# Patient Record
Sex: FEMALE | Race: WHITE | NOT HISPANIC OR LATINO | Employment: OTHER | URBAN - METROPOLITAN AREA
[De-identification: names, ages, dates, MRNs, and addresses within clinical notes are randomized per-mention and may not be internally consistent; named-entity substitution may affect disease eponyms.]

---

## 2017-01-31 ENCOUNTER — ALLSCRIPTS OFFICE VISIT (OUTPATIENT)
Dept: OTHER | Facility: OTHER | Age: 82
End: 2017-01-31

## 2017-02-14 ENCOUNTER — GENERIC CONVERSION - ENCOUNTER (OUTPATIENT)
Dept: OTHER | Facility: OTHER | Age: 82
End: 2017-02-14

## 2017-02-24 ENCOUNTER — TRANSCRIBE ORDERS (OUTPATIENT)
Dept: LAB | Facility: CLINIC | Age: 82
End: 2017-02-24

## 2017-02-24 ENCOUNTER — APPOINTMENT (OUTPATIENT)
Dept: LAB | Facility: CLINIC | Age: 82
End: 2017-02-24
Payer: MEDICARE

## 2017-02-24 ENCOUNTER — GENERIC CONVERSION - ENCOUNTER (OUTPATIENT)
Dept: OTHER | Facility: OTHER | Age: 82
End: 2017-02-24

## 2017-02-24 ENCOUNTER — ALLSCRIPTS OFFICE VISIT (OUTPATIENT)
Dept: OTHER | Facility: OTHER | Age: 82
End: 2017-02-24

## 2017-02-24 DIAGNOSIS — I50.9 HEART FAILURE (HCC): ICD-10-CM

## 2017-02-24 LAB
ANION GAP SERPL CALCULATED.3IONS-SCNC: 6 MMOL/L (ref 4–13)
BUN SERPL-MCNC: 24 MG/DL (ref 5–25)
CALCIUM SERPL-MCNC: 9.4 MG/DL (ref 8.3–10.1)
CHLORIDE SERPL-SCNC: 106 MMOL/L (ref 100–108)
CO2 SERPL-SCNC: 29 MMOL/L (ref 21–32)
CREAT SERPL-MCNC: 0.97 MG/DL (ref 0.6–1.3)
GFR SERPL CREATININE-BSD FRML MDRD: 54.8 ML/MIN/1.73SQ M
GLUCOSE SERPL-MCNC: 86 MG/DL (ref 65–140)
HBA1C MFR BLD HPLC: 6.7 %
POTASSIUM SERPL-SCNC: 4.4 MMOL/L (ref 3.5–5.3)
SODIUM SERPL-SCNC: 141 MMOL/L (ref 136–145)

## 2017-02-24 PROCEDURE — 80048 BASIC METABOLIC PNL TOTAL CA: CPT

## 2017-02-24 PROCEDURE — 36415 COLL VENOUS BLD VENIPUNCTURE: CPT

## 2017-03-01 ENCOUNTER — GENERIC CONVERSION - ENCOUNTER (OUTPATIENT)
Dept: OTHER | Facility: OTHER | Age: 82
End: 2017-03-01

## 2017-03-15 ENCOUNTER — GENERIC CONVERSION - ENCOUNTER (OUTPATIENT)
Dept: OTHER | Facility: OTHER | Age: 82
End: 2017-03-15

## 2017-03-24 ENCOUNTER — ALLSCRIPTS OFFICE VISIT (OUTPATIENT)
Dept: OTHER | Facility: OTHER | Age: 82
End: 2017-03-24

## 2017-04-25 ENCOUNTER — GENERIC CONVERSION - ENCOUNTER (OUTPATIENT)
Dept: OTHER | Facility: OTHER | Age: 82
End: 2017-04-25

## 2017-07-27 ENCOUNTER — ALLSCRIPTS OFFICE VISIT (OUTPATIENT)
Dept: OTHER | Facility: OTHER | Age: 82
End: 2017-07-27

## 2017-07-27 LAB — HBA1C MFR BLD HPLC: 6.3 %

## 2017-08-24 ENCOUNTER — GENERIC CONVERSION - ENCOUNTER (OUTPATIENT)
Dept: OTHER | Facility: OTHER | Age: 82
End: 2017-08-24

## 2017-09-01 ENCOUNTER — ALLSCRIPTS OFFICE VISIT (OUTPATIENT)
Dept: OTHER | Facility: OTHER | Age: 82
End: 2017-09-01

## 2017-09-11 ENCOUNTER — GENERIC CONVERSION - ENCOUNTER (OUTPATIENT)
Dept: OTHER | Facility: OTHER | Age: 82
End: 2017-09-11

## 2017-09-12 ENCOUNTER — GENERIC CONVERSION - ENCOUNTER (OUTPATIENT)
Dept: OTHER | Facility: OTHER | Age: 82
End: 2017-09-12

## 2017-09-26 ENCOUNTER — GENERIC CONVERSION - ENCOUNTER (OUTPATIENT)
Dept: OTHER | Facility: OTHER | Age: 82
End: 2017-09-26

## 2017-09-28 ENCOUNTER — GENERIC CONVERSION - ENCOUNTER (OUTPATIENT)
Dept: OTHER | Facility: OTHER | Age: 82
End: 2017-09-28

## 2018-01-09 ENCOUNTER — GENERIC CONVERSION - ENCOUNTER (OUTPATIENT)
Dept: FAMILY MEDICINE CLINIC | Facility: CLINIC | Age: 83
End: 2018-01-09

## 2018-01-10 NOTE — RESULT NOTES
Verified Results  (1) BASIC METABOLIC PROFILE [id number] 12:07PM Mc Torres Order Number: [id number]     Test Name Result Flag Reference   GLUCOSE,RANDM 86 mg/dL     If the patient is fasting, the ADA then defines impaired fasting glucose as > 100 mg/dL and diabetes as > or equal to 123 mg/dL  SODIUM 141 mmol/L  136-145   POTASSIUM 4 4 mmol/L  3 5-5 3   CHLORIDE 106 mmol/L  100-108   CARBON DIOXIDE 29 mmol/L  21-32   ANION GAP (CALC) 6 mmol/L  4-13   BLOOD UREA NITROGEN 24 mg/dL  5-25   CREATININE 0 97 mg/dL  0 60-1 30   Standardized to IDMS reference method   CALCIUM 9 4 mg/dL  8 3-10 1   eGFR Non-African American 54 8 ml/min/1 73sq m     [facility] [employer] Disease Education Program recommendations are as follows:  GFR calculation is accurate only with a steady state creatinine  Chronic Kidney disease less than 60 ml/min/1 73 sq  meters  Kidney failure less than 15 ml/min/1 73 sq  meters

## 2018-01-10 NOTE — RESULT NOTES
Verified Results  (1) CBC/PLT/DIFF 21Jun2016 08:15AM EPIC, Provider   Test ordered by: Dileep Guerra     Test Name Result Flag Reference   WBC COUNT 6 33 Thousand/uL  4 31-10 16   RBC COUNT 4 06 Million/uL  3 81-5 12   HEMOGLOBIN 12 7 g/dL  11 5-15 4   HEMATOCRIT 39 1 %  34 8-46  1   MCV 96 fL  82-98   MCH 31 3 pg  26 8-34 3   MCHC 32 5 g/dL  31 4-37 4   RDW 14 0 %  11 6-15 1   MPV 10 0 fL  8 9-12 7   PLATELET COUNT 303 Thousands/uL  149-390   nRBC AUTOMATED 0 /100 WBCs     NEUTROPHILS RELATIVE PERCENT 62 %  43-75   LYMPHOCYTES RELATIVE PERCENT 27 %  14-44   MONOCYTES RELATIVE PERCENT 8 %  4-12   EOSINOPHILS RELATIVE PERCENT 3 %  0-6   BASOPHILS RELATIVE PERCENT 0 %  0-1   NEUTROPHILS ABSOLUTE COUNT 3 89 Thousands/?L  1 85-7 62   LYMPHOCYTES ABSOLUTE COUNT 1 73 Thousands/?L  0 60-4 47   MONOCYTES ABSOLUTE COUNT 0 51 Thousand/?L  0 17-1 22   EOSINOPHILS ABSOLUTE COUNT 0 17 Thousand/?L  0 00-0 61   BASOPHILS ABSOLUTE COUNT 0 02 Thousands/?L  0 00-0 10     (1) COMPREHENSIVE METABOLIC PANEL [id number] 69:18ZB EPIC, Provider   Test ordered by: Dileep Guerra     Test Name Result Flag Reference   GLUCOSE,RANDM 138 mg/dL     If the patient is fasting, the ADA then defines impaired fasting glucose as > 100 mg/dL and diabetes as > or equal to 123 mg/dL     SODIUM 143 mmol/L  136-145   POTASSIUM 4 7 mmol/L  3 5-5 3   CHLORIDE 108 mmol/L  100-108   CARBON DIOXIDE 30 mmol/L  21-32   ANION GAP (CALC) 5 mmol/L  4-13   BLOOD UREA NITROGEN 22 mg/dL  5-25   CREATININE 0 77 mg/dL  0 60-1 30   Standardized to IDMS reference method   CALCIUM 9 4 mg/dL  8 3-10 1   BILI, TOTAL 0 99 mg/dL  0 20-1 00   ALK PHOSPHATAS 106 U/L     ALT (SGPT) 22 U/L  12-78   AST(SGOT) 23 U/L  5-45   ALBUMIN 4 1 g/dL  3 5-5 0   TOTAL PROTEIN 7 1 g/dL  6 4-8 2   eGFR Non-African American      >60 0 ml/min/1 73sq m   [facility] Disease Education Program recommendations are as follows:  GFR calculation is accurate only with a steady state creatinine  Chronic Kidney disease less than 60 ml/min/1 73 sq  meters  Kidney failure less than 15 ml/min/1 73 sq  meters  (1) LIPID PANEL, FASTING 21Jun2016 08:15AM EPIC, Provider   Test ordered by: Nena Borrero     Test Name Result Flag Reference   CHOLESTEROL 175 mg/dL     HDL,DIRECT 59 mg/dL  40-60   Specimen collection should occur prior to Metamizole administration due to the potential for falsely depressed results  LDL CHOLESTEROL CALCULATED 92 mg/dL  0-100   Triglyceride:         Normal              <150 mg/dl       Borderline High    150-199 mg/dl       High               200-499 mg/dl       Very High          >499 mg/dl  Cholesterol:         Desirable        <200 mg/dl      Borderline High  200-239 mg/dl      High             >239 mg/dl  HDL Cholesterol:        High    >59 mg/dL      Low     <41 mg/dL  LDL CALCULATED:    This screening LDL is a calculated result  It does not have the accuracy of the Direct Measured LDL in the monitoring of patients with hyperlipidemia and/or statin therapy  Direct Measure LDL (NXW055) must be ordered separately in these patients  TRIGLYCERIDES 119 mg/dL  <=150   Specimen collection should occur prior to N-Acetylcysteine or Metamizole administration due to the potential for falsely depressed results  (1) HEMOGLOBIN A1C 21Jun2016 08:15AM Wilda Rolando Order Number: [id number]   Order Number: [id number]     Test Name Result Flag Reference   HEMOGLOBIN A1C 6 6 % H 4 2-6 3   EST  AVG   GLUCOSE 143 mg/dl

## 2018-01-12 VITALS
DIASTOLIC BLOOD PRESSURE: 68 MMHG | HEIGHT: 66 IN | BODY MASS INDEX: 35.26 KG/M2 | HEART RATE: 84 BPM | SYSTOLIC BLOOD PRESSURE: 110 MMHG | WEIGHT: 219.38 LBS

## 2018-01-12 VITALS
OXYGEN SATURATION: 98 % | HEART RATE: 89 BPM | SYSTOLIC BLOOD PRESSURE: 100 MMHG | WEIGHT: 211 LBS | BODY MASS INDEX: 33.91 KG/M2 | DIASTOLIC BLOOD PRESSURE: 62 MMHG | HEIGHT: 66 IN

## 2018-01-13 NOTE — MISCELLANEOUS
Message   Recorded as Task   Date: 02/28/2017 01:40 PM, Created By: Dave Newman   Task Name: Medical Complaint Callback   Assigned To: Tamra Triage,Team   Regarding Patient: Tano Amador, Status: Active   Comment:    Dave Newman - 28 Feb 2017 1:40 PM     TASK CREATED  Caller: Self; Medical Complaint; [phone] (Home)  [facility] to monitor vs s/p afib, pt wants this again  Spoke with Licha at [facility] and they will need an order for homecare,  for med changes, new CHF, decreased endurance with SOB  as long as she is homebound  May we fax order to [facility] at [phone] Arash Nicole - 28 Feb 2017 3:27 PM     TASK REPLIED TO: Previously Assigned To Krystal Martinez - 28 Feb 2017 6:37 PM     TASK REASSIGNED: Previously Assigned To Marco Cruz - 01 Mar 2017 8:58 AM     TASK EDITED  Faxed orders to [phone]  Pt notified        Active Problems    1  Atrial fibrillation (427 31) (I48 91)   2  Atrial fibrillation with rapid ventricular response (427 31) (I48 91)   3  Bladder incontinence (788 30) (R32)   4  Carotid artery stenosis (433 10) (I65 29)   5  CHF (congestive heart failure) (428 0) (I50 9)   6  Controlled diabetes mellitus type II without complication (620 01) (Y36 5)   7  Coronary arteriosclerosis (414 00) (I25 10)   8  Diabetes mellitus with polyneuropathy (250 60,357 2) (E11 42)   9  Dry Eyes (375 15)   10  Esophageal reflux (530 81) (K21 9)   11  History of Fall, accidental (E888 9) (W19 XXXA)   12  Fatigue (780 79) (R53 83)   13  History of epistaxis (V12 69) (Z87 898)   14  Hyperlipidemia (272 4) (E78 5)   15  Hypertension (401 9) (I10)   16  Lipoma (214 9) (D17 9)   17  History of Lumbar compression fracture (805 4) (S32 000A)   18  OA (osteoarthritis) (715 90) (M19 90)   19  Overactive bladder (596 51) (N32 81)   20   Right lumbar radiculopathy (724 4) (M54 16)   21  Seborrheic dermatitis (690 10) (L21 9)   22  Tricuspid valve disorders, non-rheumatic (424 2) (I36 9)    Current Meds   1  Atorvastatin Calcium 80 MG Oral Tablet; TAKE 1 TABLET AT BEDTIME; Therapy: (Aime Steinberg) to Recorded   2  Eliquis 5 MG Oral Tablet; take 1 tablet every twelve hours; Therapy: (Recorded:41Jzp2543) to Recorded   3  Furosemide 20 MG Oral Tablet (Lasix); Take 2 tablet once daily or as directed (   increased 2/17); Therapy: 04CPP9429 to (Sarah Cancer)  Requested for: 63ROV9227; Last   Rx:70Jgg6024 Ordered   4  Hair Skin and Nails Formula TABS; Therapy: (Aniyah Izaguirre) to Recorded   5  Lisinopril 20 MG Oral Tablet; Take 1 tablet daily; Therapy: 28ZWL8998 to (Evaluate:30Opt1393)  Requested for: 32JXD2886; Last   Rx:33Aqz6947 Ordered   6  Metoprolol Tartrate 50 MG Oral Tablet; use 1 & 1/2 pill twice a day; Last Rx:76Myz9222   Ordered   7  Nitrostat 0 4 MG Sublingual Tablet Sublingual (Nitroglycerin); Take 1 under tongue every   5 min x 2 prn chest pain  Requested for: 14Mcb5749; Last Rx:26Kdf3618 Ordered   8  Restasis 0 05 % Ophthalmic Emulsion; INSTILL 1 DROP IN EACH EYE TWICE DAILY   Recorded   9  Vitamin D 2000 UNIT Oral Capsule; Therapy: (Recorded:28Jun2016) to Recorded    Allergies    1  Iodinated Contrast Media   2  Sulfa Drugs   3  CeleBREX CAPS    4   Adhesive Tape    Signatures   Electronically signed by : Sonya Soliz, ; Mar  1 2017  9:24AM EST                       (Author)

## 2018-01-14 VITALS
DIASTOLIC BLOOD PRESSURE: 86 MMHG | OXYGEN SATURATION: 99 % | SYSTOLIC BLOOD PRESSURE: 138 MMHG | RESPIRATION RATE: 18 BRPM | BODY MASS INDEX: 36.2 KG/M2 | HEART RATE: 118 BPM | WEIGHT: 225.25 LBS | HEIGHT: 66 IN

## 2018-01-14 VITALS
BODY MASS INDEX: 34.9 KG/M2 | HEART RATE: 102 BPM | SYSTOLIC BLOOD PRESSURE: 110 MMHG | HEIGHT: 66 IN | WEIGHT: 217.13 LBS | DIASTOLIC BLOOD PRESSURE: 68 MMHG

## 2018-01-15 VITALS
BODY MASS INDEX: 33.99 KG/M2 | HEIGHT: 66 IN | HEART RATE: 88 BPM | WEIGHT: 211.5 LBS | TEMPERATURE: 97 F | SYSTOLIC BLOOD PRESSURE: 132 MMHG | DIASTOLIC BLOOD PRESSURE: 84 MMHG

## 2018-01-15 NOTE — RESULT NOTES
Verified Results  (1) URINALYSIS (will reflex a microscopy if leukocytes, occult blood, protein or nitrites are not within normal limits) 29Jun2016 10:46AM Moi Gram Order Number: [id number]   Order Number: Ben Wong Comments: do UA C&S     Test Name Result Flag Reference   COLOR Yellow     CLARITY Clear     SPECIFIC GRAVITY UA 1 009  1 003-1 030   PH UA 5 0  4 5-8 0   LEUKOCYTE ESTERASE UA Trace A Negative   NITRITE UA Negative  Negative   PROTEIN UA Negative mg/dl  Negative   GLUCOSE UA Negative mg/dl  Negative   KETONES UA Negative mg/dl  Negative   UROBILINOGEN UA 0 2 E U /dl  0 2, 1 0 E U /dl   BILIRUBIN UA Negative  Negative   BLOOD UA Negative  Negative   Performing Comments: do UA C&S   BACTERIA Innumerable /hpf A None Seen, Occasional   Performing Comments: do UA C&S   EPITHELIAL CELLS None Seen /hpf  None Seen, Occasional   RBC UA None Seen /hpf  None Seen   WBC UA 2-4 /hpf A None Seen     (1) URINE CULTURE 29Jun2016 10:46AM Moi Gram Order Number: [id number]     Test Name Result Flag Reference   CLINICAL REPORT (Report)     Test:        Urine culture  Specimen Type:   Urine  Specimen Date:   6/29/2016 10:46 AM  Result Date:    7/1/2016 7:59 AM  Result Status:   Final result  Resulting Lab:   [address] [address]            Tel: [phone]      CULTURE                                       ------------------                                   60,000-69,000 cfu/ml Escherichia coli     *** This organism has been edited   The previous result was Gram Negative Avelino      Enteric Like on 6/30/2016 at 0800 EDT  ***      SUSCEPTIBILITY                                   ------------------                                                       Escherichia coli  METHOD                   -------------------------------------  -------------------  AMPICILLIN ($$)             >16 00 Resistant  AMPICILLIN + SULBACTAM ($)       >16/8  Resistant  AZTREONAM ($$$)             <=8   Susceptible  CEFAZOLIN ($)              >16 00 Resistant  CEFUROXIME ($$)             8    Susceptible  CIPROFLOXACIN ($)            >2 00  Resistant  GENTAMICIN ($$)             <4   Susceptible  LEVOFLOXACIN ($)            >4 00  Resistant  NITROFURANTOIN             <=32  Susceptible  PIPERACILLIN + TAZOBACTAM ($$$)     >64   Resistant  TETRACYCLINE              <=4   Susceptible  TOBRAMYCIN ($)             <=4   Susceptible  TRIMETHOPRIM + SULFAMETHOXAZOLE ($$$)  <=2/38 Susceptible       Plan  Urinary tract infection    · Nitrofurantoin Monohyd Macro 100 MG Oral Capsule; TAKE 1 CAPSULE EVERY 12  HOURS DAILY

## 2018-01-22 VITALS
OXYGEN SATURATION: 97 % | RESPIRATION RATE: 20 BRPM | HEIGHT: 66 IN | SYSTOLIC BLOOD PRESSURE: 88 MMHG | WEIGHT: 212.25 LBS | DIASTOLIC BLOOD PRESSURE: 60 MMHG | BODY MASS INDEX: 34.11 KG/M2 | HEART RATE: 70 BPM

## 2018-01-30 ENCOUNTER — OFFICE VISIT (OUTPATIENT)
Dept: FAMILY MEDICINE CLINIC | Facility: CLINIC | Age: 83
End: 2018-01-30
Payer: MEDICARE

## 2018-01-30 VITALS
SYSTOLIC BLOOD PRESSURE: 136 MMHG | HEART RATE: 84 BPM | BODY MASS INDEX: 37.63 KG/M2 | WEIGHT: 212.4 LBS | DIASTOLIC BLOOD PRESSURE: 74 MMHG | HEIGHT: 63 IN

## 2018-01-30 DIAGNOSIS — I50.32 CHRONIC DIASTOLIC CONGESTIVE HEART FAILURE (HCC): ICD-10-CM

## 2018-01-30 DIAGNOSIS — I10 ESSENTIAL HYPERTENSION: ICD-10-CM

## 2018-01-30 DIAGNOSIS — I48.20 CHRONIC ATRIAL FIBRILLATION (HCC): ICD-10-CM

## 2018-01-30 DIAGNOSIS — E11.9 CONTROLLED TYPE 2 DIABETES MELLITUS WITHOUT COMPLICATION, WITHOUT LONG-TERM CURRENT USE OF INSULIN (HCC): Primary | ICD-10-CM

## 2018-01-30 DIAGNOSIS — I50.42 CHRONIC COMBINED SYSTOLIC AND DIASTOLIC HEART FAILURE (HCC): Primary | ICD-10-CM

## 2018-01-30 PROBLEM — I48.91 ATRIAL FIBRILLATION (HCC): Status: ACTIVE | Noted: 2017-01-31

## 2018-01-30 PROBLEM — H61.23 BILATERAL IMPACTED CERUMEN: Status: ACTIVE | Noted: 2017-09-01

## 2018-01-30 PROBLEM — H61.22 IMPACTED CERUMEN OF LEFT EAR: Status: ACTIVE | Noted: 2017-09-11

## 2018-01-30 PROCEDURE — 99214 OFFICE O/P EST MOD 30 MIN: CPT | Performed by: FAMILY MEDICINE

## 2018-01-30 RX ORDER — GLIMEPIRIDE 1 MG/1
TABLET ORAL
COMMUNITY
Start: 2011-12-02 | End: 2018-01-30 | Stop reason: SDUPTHER

## 2018-01-30 RX ORDER — DABIGATRAN ETEXILATE 150 MG/1
150 CAPSULE, COATED PELLETS ORAL 2 TIMES DAILY
Qty: 1 CAPSULE | Refills: 0
Start: 2018-01-30 | End: 2018-09-13

## 2018-01-30 RX ORDER — ATORVASTATIN CALCIUM 80 MG/1
1 TABLET, FILM COATED ORAL
COMMUNITY

## 2018-01-30 RX ORDER — EZETIMIBE 10 MG/1
10 TABLET ORAL DAILY
COMMUNITY
Start: 2018-01-09

## 2018-01-30 RX ORDER — FUROSEMIDE 20 MG/1
20 TABLET ORAL 2 TIMES DAILY
COMMUNITY
Start: 2014-04-29 | End: 2018-01-30 | Stop reason: SDUPTHER

## 2018-01-30 RX ORDER — METOPROLOL TARTRATE 50 MG/1
100 TABLET, FILM COATED ORAL
COMMUNITY
End: 2018-02-05 | Stop reason: SDUPTHER

## 2018-01-30 RX ORDER — GLIMEPIRIDE 2 MG/1
0.5 TABLET ORAL DAILY
COMMUNITY
Start: 2017-08-04 | End: 2018-01-30 | Stop reason: ALTCHOICE

## 2018-01-30 RX ORDER — LISINOPRIL 20 MG/1
1 TABLET ORAL DAILY
COMMUNITY
Start: 2012-02-15 | End: 2018-03-17 | Stop reason: SDUPTHER

## 2018-01-30 RX ORDER — FUROSEMIDE 20 MG/1
TABLET ORAL
Qty: 540 TABLET | Refills: 1 | Status: SHIPPED | OUTPATIENT
Start: 2018-01-30 | End: 2018-03-20 | Stop reason: SDUPTHER

## 2018-01-30 RX ORDER — NITROGLYCERIN 0.4 MG/1
TABLET SUBLINGUAL
COMMUNITY
End: 2020-10-26 | Stop reason: SDUPTHER

## 2018-01-30 RX ORDER — CYCLOSPORINE 0.5 MG/ML
1 EMULSION OPHTHALMIC
COMMUNITY

## 2018-01-30 RX ORDER — MULTIVIT-MIN/IRON/FOLIC ACID/K 18-600-40
CAPSULE ORAL DAILY
COMMUNITY

## 2018-01-30 RX ORDER — FUROSEMIDE 20 MG/1
TABLET ORAL
Qty: 270 TABLET | Refills: 3 | Status: SHIPPED | OUTPATIENT
Start: 2018-01-30 | End: 2020-06-11

## 2018-01-30 RX ORDER — GLIMEPIRIDE 1 MG/1
0.5 TABLET ORAL DAILY
Qty: 90 TABLET | Refills: 0
Start: 2018-01-30 | End: 2019-03-19 | Stop reason: SDUPTHER

## 2018-01-30 RX ORDER — ALBUTEROL SULFATE 90 UG/1
1-2 AEROSOL, METERED RESPIRATORY (INHALATION) EVERY 4 HOURS PRN
COMMUNITY
Start: 2017-06-26

## 2018-01-30 RX ORDER — GLIMEPIRIDE 1 MG/1
TABLET ORAL
Qty: 90 TABLET | Refills: 0 | Status: SHIPPED | OUTPATIENT
Start: 2018-01-30 | End: 2018-01-30 | Stop reason: SDUPTHER

## 2018-01-30 RX ORDER — GLIMEPIRIDE 1 MG/1
TABLET ORAL
Qty: 90 TABLET | Refills: 3 | Status: SHIPPED | OUTPATIENT
Start: 2018-01-30 | End: 2018-01-30 | Stop reason: SDUPTHER

## 2018-01-30 NOTE — PATIENT INSTRUCTIONS
We reviewed her medication list, I would like her to decrease glimepiride 2 mg to a 1 (ONE)  mg pill and only use 1/2 of that as her sugars at home running too low, last A1c at 6 3, redo A1c next visit     she will continue to monitor her home weight is, she will see Cardiology again in 6 months, to stagger visits I will see her again in 3 months and then 6 months following that, she will call sooner if needed    December blood work showed hemoglobin 13 7, creatinine 0 86 -  She has a slip from Cardiology to redo blood work within 6 months

## 2018-01-30 NOTE — PROGRESS NOTES
FAMILY PRACTICE OFFICE VISIT       NAME: Shivani Vargas  AGE: 80 y o  SEX: female       : 1933        MRN: [id number]    DATE: 2018  TIME: 11:18 AM    Assessment and Plan     Problem List Items Addressed This Visit     Atrial fibrillation ([address] )    Relevant Medications    metoprolol tartrate (LOPRESSOR) 50 mg tablet    nitroglycerin (NITROSTAT) 0 4 mg SL tablet    dabigatran etexilate (PRADAXA) 150 mg capsu    Controlled diabetes mellitus type II without complication (HCC) - Primary    Relevant Medications    glimepiride (AMARYL) 1 mg tablet    Hypertension    Relevant Medications    lisinopril (ZESTRIL) 20 mg tablet    metoprolol tartrate (LOPRESSOR) 50 mg tablet    furosemide (LASIX) 20 mg tablet    Chronic diastolic congestive heart failure (HCC)    Relevant Medications    metoprolol tartrate (LOPRESSOR) 50 mg tablet    nitroglycerin (NITROSTAT) 0 4 mg SL tablet    furosemide (LASIX) 20 mg tablet            Patient Instructions    We reviewed her medication list, I would like her to decrease glimepiride 2 mg to a 1 (ONE)  mg pill and only use 1/2 of that as her sugars at home running too low, last A1c at 6 3, redo A1c next visit  she will continue to monitor her home weight is, she will see Cardiology again in 6 months, to stagger visits I will see her again in 3 months and then 6 months following that, she will call sooner if needed    December blood work showed hemoglobin 13 7, creatinine 0 86 -  She has a slip from Cardiology to redo blood work within 6 months          Chief Complaint     Chief Complaint   Patient presents with    Follow-up     6 month, A Fib, CHF etc       History of Present Illness   Shivani Vargas is a 80y o -year-old female who Is in today for a 6 month check, overall she is feeling well, she did see her cardiologist earlier in January, she is changing Eliquis to Pradaxa although cost is still an issue  She has no bleeding    She is using furosemide 40 mg in the morning, 20 mg in the afternoon and her weight is stable  Her home glucometer readings do run down into the 60s at times with using 1/2 of her 2 mg glimepiride  She uses Ventolin inhaler less than weekly     Review of Systems   Review of Systems   Constitutional: Negative for activity change, appetite change, fatigue, fever and unexpected weight change  HENT: Negative for congestion, sore throat and trouble swallowing  Eyes: Negative for redness  Respiratory: Negative for cough, choking, chest tightness and wheezing  Cardiovascular: Positive for leg swelling (Chronic edema stable)  Negative for chest pain  Gastrointestinal: Negative for abdominal pain, anal bleeding and blood in stool  Genitourinary: Negative for difficulty urinating and dysuria  Neurological: Negative for dizziness, syncope, weakness, light-headedness and headaches  Hematological: Bruises/bleeds easily (No bleeding, on anticoagulation)         Active Problem List     Patient Active Problem List   Diagnosis    Atrial fibrillation ([facility] [address] )    Bilateral impacted cerumen    Bladder incontinence    Carotid artery stenosis    Controlled diabetes mellitus type II without complication (HCC)    Coronary arteriosclerosis    Diabetes mellitus with polyneuropathy (HCC)    Tear film insufficiency    Esophageal reflux    Hyperlipidemia    Hypertension    Impacted cerumen of left ear    Lipoma    OA (osteoarthritis)    Overactive bladder    Right lumbar radiculopathy    Seborrheic dermatitis    Tricuspid valve disorders, non-rheumatic    Chronic diastolic congestive heart failure (HCC)    Obesity    Sinus bradycardia    Spinal stenosis of lumbar region       Past Medical History:  Past Medical History:   Diagnosis Date    Atrial fibrillation with rapid ventricular response ([facility] [address] [facility] )     RESOLVED: 62UJP0271    Lumbar compression fracture (HCC)     RESOLVED: 38XRC9873    Meningocele (HCC)     ACQUIRED SPINAL CORD; SURGERY 1934    Nasal fracture     RESOLVED: 46WQG0026       Past Surgical History:  Past Surgical History:   Procedure Laterality Date    CATARACT EXTRACTION      COLONOSCOPY      ONSET: 1998    HYSTERECTOMY      REPLACEMENT TOTAL KNEE BILATERAL      ONSET: NOV 2011    SCALP EXCISION  1934    LESION;     TONSILLECTOMY      TRANSLUMINAL ANGIOPLASTY      INTRAOPERATIVE        Family History:  Family History   Problem Relation Age of Onset    Eclampsia Mother     Lung cancer Father     Diabetes Son     Breast cancer Family        Social History:  Social History     Social History    Marital status: Single     Spouse name: N/A    Number of children: N/A    Years of education: N/A     Occupational History    Not on file  Social History Main Topics    Smoking status: Never Smoker    Smokeless tobacco: Never Used    Alcohol use No    Drug use: No    Sexual activity: Not on file     Other Topics Concern    Not on file     Social History Narrative    No narrative on file       Objective     Vitals:    01/30/18 1024   BP: 136/74   Pulse: 84   Weight: 96 3 kg (212 lb 6 4 oz)   Height: 5' 3" (1 6 m)     Wt Readings from Last 3 Encounters:   01/30/18 96 3 kg (212 lb 6 4 oz)   09/11/17 96 3 kg (212 lb 4 oz)   09/01/17 95 9 kg (211 lb 8 oz)       Physical Exam   Constitutional: She is oriented to person, place, and time  She appears well-developed and well-nourished  Pleasant obese female, no acute distress   Eyes: Conjunctivae are normal  No scleral icterus  Cardiovascular: Normal rate  An irregularly irregular rhythm present  Rate controlled AFib   Pulmonary/Chest: Effort normal and breath sounds normal  She has no wheezes  Abdominal: Soft  There is no tenderness  Musculoskeletal: She exhibits edema (Chronic +2 slightly pitting edema bilateral, no redness or open wounds)  Lymphadenopathy:     She has no cervical adenopathy  Neurological: She is alert and oriented to person, place, and time     Psychiatric: She has a normal mood and affect  Her behavior is normal  Judgment and thought content normal        Pertinent Laboratory/Diagnostic Studies:          ALLERGIES:  Allergies   Allergen Reactions    Celecoxib      Reaction Date: 05Dec2011;     Latex     Iodinated Diagnostic Agents Rash    Sulfa Antibiotics Rash       Current Medications     Current Outpatient Prescriptions   Medication Sig Dispense Refill    albuterol (VENTOLIN HFA) 90 mcg/act inhaler Inhale 1-2 puffs every 4 (four) hours as needed       atorvastatin (LIPITOR) 80 mg tablet Take 1 tablet by mouth      Cholecalciferol (VITAMIN D) 2000 units CAPS Take by mouth      cycloSPORINE (RESTASIS MULTIDOSE) 0 05 % ophthalmic emulsion Apply 1 drop to eye 2 (two) times a day      ezetimibe (ZETIA) 10 mg tablet       furosemide (LASIX) 20 mg tablet -- 2 in AM, 1 in afternoon or as directed 540 tablet 1    lisinopril (ZESTRIL) 20 mg tablet Take 1 tablet by mouth daily      metoprolol tartrate (LOPRESSOR) 50 mg tablet Take 100 mg by mouth 2 (two) times a day       Multiple Vitamins-Minerals (HAIR SKIN AND NAILS FORMULA) TABS Take by mouth      nitroglycerin (NITROSTAT) 0 4 mg SL tablet Place under the tongue      dabigatran etexilate (PRADAXA) 150 mg capsu Take 1 capsule (150 mg total) by mouth 2 (two) times a day 1 capsule 0    glimepiride (AMARYL) 1 mg tablet Take 0 5 tablets (0 5 mg total) by mouth daily ----- Use 1/2 pill daily 90 tablet 0     No current facility-administered medications for this visit            Health Maintenance       Asya Fofana DO

## 2018-02-05 DIAGNOSIS — I48.20 CHRONIC ATRIAL FIBRILLATION (HCC): Primary | ICD-10-CM

## 2018-02-05 RX ORDER — METOPROLOL TARTRATE 50 MG/1
100 TABLET, FILM COATED ORAL 2 TIMES DAILY
Qty: 360 TABLET | Refills: 3 | Status: SHIPPED | OUTPATIENT
Start: 2018-02-05 | End: 2018-02-07 | Stop reason: SDUPTHER

## 2018-02-07 ENCOUNTER — TELEPHONE (OUTPATIENT)
Dept: FAMILY MEDICINE CLINIC | Facility: CLINIC | Age: 83
End: 2018-02-07

## 2018-02-07 DIAGNOSIS — I48.20 CHRONIC ATRIAL FIBRILLATION (HCC): ICD-10-CM

## 2018-02-07 RX ORDER — METOPROLOL TARTRATE 100 MG/1
100 TABLET ORAL 2 TIMES DAILY
Qty: 90 TABLET | Refills: 3 | Status: SHIPPED | OUTPATIENT
Start: 2018-02-07 | End: 2018-11-09 | Stop reason: SDUPTHER

## 2018-02-07 NOTE — TELEPHONE ENCOUNTER
New rx for 90 day script to [facility] sent for 100 mg pills to use ONE pill twice daily    Please notify [facility]

## 2018-02-07 NOTE — TELEPHONE ENCOUNTER
Elena with [facility] called requesting authorization to dispense Metoprolol Tartrate 100mg tablets for pt to take BID rather than 50mg tablets for pt to take 2 tablets BID

## 2018-02-19 ENCOUNTER — OFFICE VISIT (OUTPATIENT)
Dept: FAMILY MEDICINE CLINIC | Facility: CLINIC | Age: 83
End: 2018-02-19
Payer: MEDICARE

## 2018-02-19 VITALS
TEMPERATURE: 97.8 F | DIASTOLIC BLOOD PRESSURE: 62 MMHG | WEIGHT: 210.4 LBS | SYSTOLIC BLOOD PRESSURE: 130 MMHG | HEIGHT: 63 IN | HEART RATE: 92 BPM | BODY MASS INDEX: 37.28 KG/M2 | OXYGEN SATURATION: 97 %

## 2018-02-19 DIAGNOSIS — B34.9 ACUTE VIRAL SYNDROME: Primary | ICD-10-CM

## 2018-02-19 DIAGNOSIS — R04.0 EPISTAXIS: ICD-10-CM

## 2018-02-19 DIAGNOSIS — J40 BRONCHITIS: ICD-10-CM

## 2018-02-19 PROCEDURE — 99214 OFFICE O/P EST MOD 30 MIN: CPT | Performed by: FAMILY MEDICINE

## 2018-02-19 RX ORDER — AZITHROMYCIN 250 MG/1
TABLET, FILM COATED ORAL
Qty: 6 TABLET | Refills: 0 | Status: SHIPPED | OUTPATIENT
Start: 2018-02-19 | End: 2018-02-24

## 2018-02-19 NOTE — PATIENT INSTRUCTIONS
We discussed onset viral symptoms with increased congestion and cough 3 days ago, fortunately afebrile at present  She does have history of nose bleeds, does have old blood left nasal septum, continues on anticoagulation, I would like her to skip Eliquis times 24 hours then restart  If worsens we will have to set her up with ENT for cauterization  Last hemoglobin in December 13 7  Use steam/ humidity  Use vaseline in nare/ on septum  Avoid blowing nose  If fever 100+ or  worsens I would like her to start antibiotic, prescription for azithromycin given  Also can use guaifenesin to thin mucus as needed, Robitussin or Mucinex        Use other medications as is

## 2018-02-19 NOTE — PROGRESS NOTES
FAMILY PRACTICE OFFICE VISIT      Jin Suri PRICILLA MCCORMICK  [address]    [address] [facility]    [address] [place], [place], [address]      NAME: Sonia Oquendo  AGE: 80 y o  SEX: female  : 1933   MRN: [id number]    DATE: 2018  TIME: 3:05 PM    Assessment and Plan     Problem List Items Addressed This Visit     None      Visit Diagnoses     Acute viral syndrome    -  Primary    Epistaxis        Bronchitis        Relevant Medications    azithromycin (ZITHROMAX) 250 mg tablet            Patient Instructions     We discussed onset viral symptoms with increased congestion and cough 3 days ago, fortunately afebrile at present  She does have history of nose bleeds, does have old blood left nasal septum, continues on anticoagulation, I would like her to skip Eliquis times 24 hours then restart  If worsens we will have to set her up with ENT for cauterization  Last hemoglobin in  7  Use steam/ humidity  Use vaseline in nare/ on septum  Avoid blowing nose  If fever 100+ or  worsens I would like her to start antibiotic, prescription for azithromycin given  Also can use guaifenesin to thin mucus as needed, Robitussin or Mucinex  Use other medications as is          Chief Complaint     Chief Complaint   Patient presents with    Nasal Drainage     x 3 days ago        History of Present Illness     Sonia Oquendo is a 80y o -year-old female who Had onset 3 days ago congestion with cough along with fever and chills, also with nose bleeds, continues on anticoagulation, still on Eliquis, has not switched to Pradaxa yet  Does note some increased dyspnea on exertion  Home glucometer reading today in the 90s  Review of Systems     Review of Systems   Constitutional: Positive for chills and fever  Negative for diaphoresis and fatigue  HENT: Positive for congestion, nosebleeds, postnasal drip, rhinorrhea and sore throat   Negative for ear pain, facial swelling, hearing loss, mouth sores, sinus pressure, sneezing and trouble swallowing  Eyes: Negative for pain and discharge  Respiratory: Positive for cough and shortness of breath  Negative for wheezing  Cardiovascular: Negative for chest pain  Gastrointestinal: Positive for diarrhea  Negative for abdominal pain, nausea and vomiting  Genitourinary: Negative for difficulty urinating  Skin: Negative for rash  Neurological: Negative for dizziness, syncope, weakness, light-headedness and headaches  Hematological: Negative for adenopathy  Bruises/bleeds easily         Active Problem List     Patient Active Problem List   Diagnosis    [facility] fibrillation ([facility] )    Bilateral impacted cerumen    Bladder incontinence    Carotid artery stenosis    Controlled diabetes mellitus type II without complication ([facility])    Coronary arteriosclerosis    Diabetes mellitus with polyneuropathy ([facility])    Tear film insufficiency    Esophageal reflux    Hyperlipidemia    Hypertension    Impacted cerumen of left ear    Lipoma    OA (osteoarthritis)    Overactive bladder    Right lumbar radiculopathy    Seborrheic dermatitis    Tricuspid valve disorders, non-rheumatic    Chronic diastolic congestive heart failure ([facility])    Obesity    Sinus bradycardia    Spinal stenosis of lumbar region       Past Medical History:  Past Medical History:   Diagnosis Date    Atrial fibrillation with rapid ventricular response (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; SURGERY 1934    Nasal fracture     RESOLVED: [id number]       Past Surgical History:  Past Surgical History:   Procedure Laterality Date    CATARACT EXTRACTION      COLONOSCOPY      ONSET: 1998    HYSTERECTOMY      REPLACEMENT TOTAL KNEE BILATERAL      ONSET: NOV 2011    SCALP EXCISION  1934    LESION;     TONSILLECTOMY      TRANSLUMINAL ANGIOPLASTY      INTRAOPERATIVE        Family History:  Family History   Problem Relation Age of Onset   Omer Majano Eclampsia Mother     Lung cancer Father     Diabetes Son     Breast cancer Family        Social History:  Social History     Social History    Marital status: Single     Spouse name: N/A    Number of children: N/A    Years of education: N/A     Occupational History    Not on file  Social History Main Topics    Smoking status: Never Smoker    Smokeless tobacco: Never Used    Alcohol use No    Drug use: No    Sexual activity: Not on file     Other Topics Concern    Not on file     Social History Narrative    No narrative on file       Objective     Vitals:    02/19/18 1417   BP: 130/62   Pulse: 92   Temp: 97 8 °F (36 6 °C)   SpO2: 97%   Weight: 95 4 kg (210 lb 6 4 oz)   Height: 5' 3" (1 6 m)     Body mass index is 37 27 kg/m²  BP Readings from Last 3 Encounters:   02/19/18 130/62   01/30/18 136/74   09/11/17 (!) 88/60       Wt Readings from Last 3 Encounters:   02/19/18 95 4 kg (210 lb 6 4 oz)   01/30/18 96 3 kg (212 lb 6 4 oz)   09/11/17 96 3 kg (212 lb 4 oz)       Physical Exam   Constitutional: She is oriented to person, place, and time  She appears well-developed and well-nourished  HENT:   Mouth/Throat: No oropharyngeal exudate  Small area old blood left nasal septum   Eyes: Conjunctivae are normal  No scleral icterus  Cardiovascular:   Irregularly irregular at controlled rate   Pulmonary/Chest: Effort normal and breath sounds normal    Abdominal: Soft  There is no tenderness  Lymphadenopathy:     She has no cervical adenopathy  Neurological: She is alert and oriented to person, place, and time  Psychiatric: She has a normal mood and affect   Her behavior is normal  Judgment and thought content normal        Pertinent Laboratory/Diagnostic Studies:  Results for orders placed or performed in visit on 07/27/17   POCT hemoglobin A1c (Historical)   Result Value Ref Range Hemoglobin A1C 6 3          ALLERGIES:  Allergies   Allergen Reactions    Celecoxib      Reaction Date: 05Dec2011;     Latex     Iodinated Diagnostic Agents Rash    Sulfa Antibiotics Rash       Current Medications     Current Outpatient Prescriptions   Medication Sig Dispense Refill    albuterol (VENTOLIN HFA) 90 mcg/act inhaler Inhale 1-2 puffs every 4 (four) hours as needed       atorvastatin (LIPITOR) 80 mg tablet Take 1 tablet by mouth      Cholecalciferol (VITAMIN D) 2000 units CAPS Take by mouth      cycloSPORINE (RESTASIS MULTIDOSE) 0 05 % ophthalmic emulsion Apply 1 drop to eye 2 (two) times a day      dabigatran etexilate (PRADAXA) 150 mg capsu Take 1 capsule (150 mg total) by mouth 2 (two) times a day 1 capsule 0    ezetimibe (ZETIA) 10 mg tablet       furosemide (LASIX) 20 mg tablet Use 2 pills in the morning, 1 in the afternoon or as directed 270 tablet 3    furosemide (LASIX) 20 mg tablet -- 2 in AM, 1 in afternoon or as directed 540 tablet 1    glimepiride (AMARYL) 1 mg tablet Take 0 5 tablets (0 5 mg total) by mouth daily ----- Use 1/2 pill daily 90 tablet 0    lisinopril (ZESTRIL) 20 mg tablet Take 1 tablet by mouth daily      metoprolol tartrate (LOPRESSOR) 100 mg tablet Take 1 tablet (100 mg total) by mouth 2 (two) times a day 90 tablet 3    Multiple Vitamins-Minerals (HAIR SKIN AND NAILS FORMULA) TABS Take by mouth      nitroglycerin (NITROSTAT) 0 4 mg SL tablet Place under the tongue      azithromycin (ZITHROMAX) 250 mg tablet Take 2 tablets today then 1 tablet daily x 4 days 6 tablet 0     No current facility-administered medications for this visit  Health Maintenance     No orders of the defined types were placed in this encounter          Flor Castro DO

## 2018-03-17 DIAGNOSIS — I10 ESSENTIAL HYPERTENSION: Primary | ICD-10-CM

## 2018-03-17 RX ORDER — LISINOPRIL 20 MG/1
TABLET ORAL
Qty: 90 TABLET | Refills: 3 | Status: SHIPPED | OUTPATIENT
Start: 2018-03-17 | End: 2018-03-20 | Stop reason: SDUPTHER

## 2018-03-20 ENCOUNTER — OFFICE VISIT (OUTPATIENT)
Dept: FAMILY MEDICINE CLINIC | Facility: CLINIC | Age: 83
End: 2018-03-20
Payer: MEDICARE

## 2018-03-20 VITALS
HEART RATE: 76 BPM | HEIGHT: 63 IN | WEIGHT: 212 LBS | SYSTOLIC BLOOD PRESSURE: 106 MMHG | OXYGEN SATURATION: 96 % | BODY MASS INDEX: 37.56 KG/M2 | DIASTOLIC BLOOD PRESSURE: 74 MMHG

## 2018-03-20 DIAGNOSIS — E11.42 TYPE 2 DIABETES MELLITUS WITH DIABETIC POLYNEUROPATHY, WITHOUT LONG-TERM CURRENT USE OF INSULIN (HCC): ICD-10-CM

## 2018-03-20 DIAGNOSIS — Z00.00 MEDICARE ANNUAL WELLNESS VISIT, SUBSEQUENT: ICD-10-CM

## 2018-03-20 DIAGNOSIS — I10 ESSENTIAL HYPERTENSION: ICD-10-CM

## 2018-03-20 DIAGNOSIS — I50.32 CHRONIC DIASTOLIC CONGESTIVE HEART FAILURE (HCC): ICD-10-CM

## 2018-03-20 DIAGNOSIS — E11.42 DIABETIC POLYNEUROPATHY ASSOCIATED WITH TYPE 2 DIABETES MELLITUS (HCC): Primary | ICD-10-CM

## 2018-03-20 LAB — SL AMB POCT HEMOGLOBIN AIC: 6.5

## 2018-03-20 PROCEDURE — 83036 HEMOGLOBIN GLYCOSYLATED A1C: CPT | Performed by: FAMILY MEDICINE

## 2018-03-20 PROCEDURE — G0439 PPPS, SUBSEQ VISIT: HCPCS | Performed by: FAMILY MEDICINE

## 2018-03-20 PROCEDURE — 99213 OFFICE O/P EST LOW 20 MIN: CPT | Performed by: FAMILY MEDICINE

## 2018-03-20 RX ORDER — LISINOPRIL 20 MG/1
20 TABLET ORAL DAILY
Qty: 90 TABLET | Refills: 0
Start: 2018-03-20 | End: 2018-03-26 | Stop reason: SDUPTHER

## 2018-03-20 NOTE — PATIENT INSTRUCTIONS
She is in today for a Subsequent Medicare evaluation/ regular check  She is doing well here today, fluid status appears stable, at last visit we decreased glimepiride to 1/2 of 1 mg pill pill / 0 5 mg daily due to occasional hypoglycemia, home readings have been in the  range, A1c run here today at 6 5 is only slightly increased versus prior 6 3  She will continue on meds as is  She will call if she drops less than 70    -she does see a podiatrist routinely, she does see her eye doctor  She will continue to see her cardiologist, Dr Virgilio Blanco,  She will be doing blood work via them again in July, previous blood work in December showed hemoglobin 13 7, creatinine 0 86, lipids were fine  Last TSH in July 2017 was fine  She is now on Pradaxa rather than Eliquis, no sign of bleeding  Immunization History   Administered Date(s) Administered    Influenza 10/15/2015, 11/01/2017    Influenza Split High Dose Preservative Free IM 09/23/2014, 10/24/2015, 09/27/2016    Influenza TIV (IM) 10/01/2011, 10/01/2012    Pneumococcal 10/18/2015    Pneumococcal Conjugate 13-Valent 03/12/2015    Pneumococcal Polysaccharide PPV23 07/22/2003    Zoster 03/01/2011       Discussed Pneumococcal vaccines,    Prevnar  is up to date   Pneumovax  is up to date  does do yearly Flu shot  If Tdap ( tetanus shot)  is not up-to-date, can look into coverage for it (done every 10 years in general) and also look into coverage for new shingles shot, Shingrix ( even if previously received Zostavax )  Can do those at pharmacy  non-smoker     Regarding Colon Cancer screening, we discussed Colonoscopy vs ColoGuard :  Screening is up to date ( not indicated )      She does not see her Gynecologist routinely  ( not indicated)  Mammogram screening was discussed, is  not indicated  Discussed bone density screening/ DEXA Scan, this is on hold for now  Vipin Isbell discussed end of life planning, she does have a  "LIVING WILL" Regarding Hepatitis C Screening,  after discussion she will not do Hepatitis C blood test    not indicated    Glaucoma screening is up-to-date    We did review previous blood work,   She is up to date with Lipid screening  She is up to date with Diabetes screening  Discussed importance of routine exercise, healthy diet     Omer Dyer -  Can see Dermatology for full body skin exam/skin cancer screening,   should see Dentist routinely      We will see her back in 6 months, sooner as needed

## 2018-03-20 NOTE — PROGRESS NOTES
Arash PLEITEZ  [address] [address] [facility]    [facility]    [address] [place] [address]    [address] [place] [address], [address] MEDICARE SUBSEQUENT VISIT NOTE ( part 1 )      NAME: Ellyn Roque  AGE: 80 y o  SEX: female  : 1933   MRN: [id number]    DATE: 3/22/2018  TIME: 7:52 AM    Assessment and Plan     Problem List Items Addressed This Visit        Endocrine    RESOLVED: Diabetes mellitus with polyneuropathy ([facility] ) - Primary    Relevant Orders    POCT hemoglobin A1c (Completed)    Type 2 diabetes mellitus with diabetic polyneuropathy, without long-term current use of insulin (HCC)       Cardiovascular and Mediastinum    Chronic diastolic congestive heart failure (HCC)    Hypertension    Relevant Medications    lisinopril (ZESTRIL) 20 mg tablet      Other Visit Diagnoses     Medicare annual wellness visit, subsequent              Medicare Wellness Counseling/ Discussion    Patient Instructions     She is in today for a Subsequent Medicare evaluation/ regular check  She is doing well here today, fluid status appears stable, at last visit we decreased glimepiride to 1/2 of 1 mg pill pill / 0 5 mg daily due to occasional hypoglycemia, home readings have been in the  range, A1c run here today at 6 5 is only slightly increased versus prior 6 3  She will continue on meds as is  She will call if she drops less than 70    -she does see a podiatrist routinely, she does see her eye doctor  She will continue to see her cardiologist, Dr Promise Dale,  She will be doing blood work via them again in July, previous blood work in December showed hemoglobin 13 7, creatinine 0 86, lipids were fine  Last TSH in 2017 was fine  She is now on Pradaxa rather than Eliquis, no sign of bleeding      Immunization History   Administered Date(s) Administered    Influenza 10/15/2015, 2017    Influenza Split High Dose Preservative Free IM 2014, 10/24/2015, 2016  Influenza TIV (IM) 10/01/2011, 10/01/2012    Pneumococcal 10/18/2015    Pneumococcal Conjugate 13-Valent 03/12/2015    Pneumococcal Polysaccharide PPV23 07/22/2003    Zoster 03/01/2011       Discussed Pneumococcal vaccines,    Prevnar  is up to date   Pneumovax  is up to date  does do yearly Flu shot  If Tdap ( tetanus shot)  is not up-to-date, can look into coverage for it (done every 10 years in general) and also look into coverage for new shingles shot, Shingrix ( even if previously received Zostavax )  Can do those at pharmacy  non-smoker     Regarding Colon Cancer screening, we discussed Colonoscopy vs ColoGuard :  Screening is up to date ( not indicated )      She does not see her Gynecologist routinely  ( not indicated)  Mammogram screening was discussed, is  not indicated  Discussed bone density screening/ DEXA Scan, this is on hold for now  Tonda Essex discussed end of life planning, she does have a  "LIVING WILL"       Regarding Hepatitis C Screening,  after discussion she will not do Hepatitis C blood test    not indicated    Glaucoma screening is up-to-date    We did review previous blood work,   She is up to date with Lipid screening  She is up to date with Diabetes screening  Discussed importance of routine exercise, healthy diet     Sandor Topete -  Can see Dermatology for full body skin exam/skin cancer screening,   should see Dentist routinely  We will see her back in 6 months, sooner as needed                    Chief Complaint     Chief Complaint   Patient presents with    Medicare Wellness Visit     Subsequent        History of Present Illness     Andrew Figueroa is a 80y o -year-old female who presents today for a regular follow-up along with annual wellness visit, she relates she is feeling well, she is using medication as directed, has transition from Eliquis to Pradaxa, no bleeding  Fluid status is stable, no increased shortness of breath    At her visit in January we decrease glimepiride to 1/2 pill of 1 mg, no low readings, mostly runs  in the morning  Review of Systems     Review of Systems   Constitutional: Negative for activity change, appetite change, chills, diaphoresis, fatigue, fever and unexpected weight change (She monitors her home weights, stable)  HENT: Negative for congestion, mouth sores, nosebleeds (Past history nose bleeds, none recent), sore throat, trouble swallowing and voice change  Eyes: Negative for pain, discharge and visual disturbance  Respiratory: Positive for shortness of breath (Shortness of breath is at baseline, no recent need for inhaler)  Negative for cough, chest tightness and wheezing  Cardiovascular: Negative for chest pain, palpitations and leg swelling (Chronic edema left greater than right lower extremity, stable)  Gastrointestinal: Negative for abdominal distention, abdominal pain, anal bleeding, blood in stool, constipation, diarrhea, nausea and vomiting  Endocrine: Negative for polydipsia and polyuria  Genitourinary: Positive for difficulty urinating (Does have ongoing issues with urgency and incontinence, this is stable)  Negative for dysuria and hematuria  Musculoskeletal: Positive for arthralgias (Low back, knees, hips stable)  Skin: Negative for wound  Neurological: Negative for dizziness, seizures, syncope, speech difficulty, weakness, light-headedness and headaches  Hematological: Negative for adenopathy  Bruises/bleeds easily  Psychiatric/Behavioral: Negative for behavioral problems and confusion  The patient is not nervous/anxious          Active Problem List     Patient Active Problem List   Diagnosis    Atrial fibrillation ([facility] )    Bilateral impacted cerumen    Bladder incontinence    Carotid artery stenosis    Type 2 diabetes mellitus with diabetic polyneuropathy, without long-term current use of insulin (HCC)    Coronary arteriosclerosis    Esophageal reflux    Hyperlipidemia    Hypertension  Impacted cerumen of left ear    Lipoma    OA (osteoarthritis)    Overactive bladder    Right lumbar radiculopathy    Seborrheic dermatitis    Tricuspid valve disorders, non-rheumatic    Chronic diastolic congestive heart failure (HCC)    Obesity    Sinus bradycardia    Spinal stenosis of lumbar region       Past Medical History:  Past Medical History:   Diagnosis Date    Atrial fibrillation with rapid ventricular response (HCC)     RESOLVED: [id number]    Lumbar compression fracture (HCC)     RESOLVED: [id number]    Meningocele (HCC)     ACQUIRED SPINAL CORD; SURGERY 1934    Nasal fracture     RESOLVED: [id number]       Past Surgical History:  Past Surgical History:   Procedure Laterality Date    CATARACT EXTRACTION      COLONOSCOPY      ONSET: 1998    HYSTERECTOMY      REPLACEMENT TOTAL KNEE BILATERAL      ONSET: NOV 2011    SCALP EXCISION  1934    LESION;     TONSILLECTOMY      TRANSLUMINAL ANGIOPLASTY      INTRAOPERATIVE        Family History:  Family History   Problem Relation Age of Onset    Eclampsia Mother     Lung cancer Father     Diabetes Son     Breast cancer Family        Social History:  Social History     Social History    Marital status: Single     Spouse name: N/A    Number of children: N/A    Years of education: N/A     Occupational History    Not on file  Social History Main Topics    Smoking status: Never Smoker    Smokeless tobacco: Never Used    Alcohol use No    Drug use: No    Sexual activity: Not on file     Other Topics Concern    Not on file     Social History Narrative    No narrative on file       Objective     Vitals:    03/20/18 1020   BP: 106/74   Pulse: 76   SpO2: 96%   Weight: 96 2 kg (212 lb)   Height: 5' 3" (1 6 m)     Body mass index is 37 55 kg/m²      BP Readings from Last 3 Encounters:   03/20/18 106/74   02/19/18 130/62   01/30/18 136/74       Wt Readings from Last 3 Encounters:   03/20/18 96 2 kg (212 lb)   02/19/18 95 4 kg (210 lb 6 4 oz)   01/30/18 96 3 kg (212 lb 6 4 oz)       Physical Exam   Constitutional: She is oriented to person, place, and time  She appears well-developed and well-nourished  No distress  Pleasant obese female       HENT:   Mouth/Throat: Oropharynx is clear and moist    TM clear b/l  Non occlusive wax left ear, soft   Eyes: Conjunctivae are normal  No scleral icterus  Neck: Normal range of motion  Cardiovascular:   Irregularly irregular at controlled rate   Pulmonary/Chest: Effort normal and breath sounds normal    Abdominal: Soft  There is no tenderness  Musculoskeletal: She exhibits edema (Chronic slightly pitting swelling left greater than right lower extremity to mid upper calf , no calf tenderness)  Lymphadenopathy:     She has no cervical adenopathy  Neurological: She is alert and oriented to person, place, and time  No cranial nerve deficit  Coordination normal    Skin: Skin is warm and dry  She is not diaphoretic  Psychiatric: She has a normal mood and affect   Her behavior is normal  Judgment and thought content normal        ALLERGIES:  Allergies   Allergen Reactions    Celecoxib      Reaction Date: 05Dec2011;     Latex     Iodinated Diagnostic Agents Rash    Sulfa Antibiotics Rash       Current Medications     Current Outpatient Prescriptions   Medication Sig Dispense Refill    albuterol (VENTOLIN HFA) 90 mcg/act inhaler Inhale 1-2 puffs every 4 (four) hours as needed       atorvastatin (LIPITOR) 80 mg tablet Take 1 tablet by mouth      Cholecalciferol (VITAMIN D) 2000 units CAPS Take by mouth daily       cycloSPORINE (RESTASIS MULTIDOSE) 0 05 % ophthalmic emulsion Apply 1 drop to eye Medrol Dose Pack scheduling ONLY        dabigatran etexilate (PRADAXA) 150 mg capsu Take 1 capsule (150 mg total) by mouth 2 (two) times a day 1 capsule 0    ezetimibe (ZETIA) 10 mg tablet daily       furosemide (LASIX) 20 mg tablet Use 2 pills in the morning, 1 in the afternoon or as directed 270 tablet 3  glimepiride (AMARYL) 1 mg tablet Take 0 5 tablets (0 5 mg total) by mouth daily ----- Use 1/2 pill daily 90 tablet 0    lisinopril (ZESTRIL) 20 mg tablet Take 1 tablet (20 mg total) by mouth daily 90 tablet 0    metoprolol tartrate (LOPRESSOR) 100 mg tablet Take 1 tablet (100 mg total) by mouth 2 (two) times a day 90 tablet 3    Multiple Vitamins-Minerals (HAIR SKIN AND NAILS FORMULA) TABS Take by mouth      nitroglycerin (NITROSTAT) 0 4 mg SL tablet Place under the tongue       No current facility-administered medications for this visit            Health Maintenance     See other note today re clinical AWV info        Pertinent Laboratory/Diagnostic Studies:  Results for orders placed or performed in visit on 03/20/18   POCT hemoglobin A1c   Result Value Ref Range     HGB A1C 6 5        Orders Placed This Encounter   Procedures    POCT hemoglobin A1c             Lexi Neves DO

## 2018-03-22 NOTE — PROGRESS NOTES
Arash PLEITEZ  [id number] [address] [facility]    [facility]    [address]     [address]    [place], [place], [id number] MEDICARE SUBSEQUENT VISIT NOTE ( part 2 )          Problem List Items Addressed This Visit        Endocrine    RESOLVED: Diabetes mellitus with polyneuropathy ([address] ) - Primary    Relevant Orders    POCT hemoglobin A1c (Completed)    Type 2 diabetes mellitus with diabetic polyneuropathy, without long-term current use of insulin (HCC)       Cardiovascular and Mediastinum    Chronic diastolic congestive heart failure (HCC)    Hypertension    Relevant Medications    lisinopril (ZESTRIL) 20 mg tablet      Other Visit Diagnoses     Medicare annual wellness visit, subsequent              Patient Instructions     She is in today for a Subsequent Medicare evaluation/ regular check  She is doing well here today, fluid status appears stable, at last visit we decreased glimepiride to 1/2 of 1 mg pill pill / 0 5 mg daily due to occasional hypoglycemia, home readings have been in the  range, A1c run here today at 6 5 is only slightly increased versus prior 6 3  She will continue on meds as is  She will call if she drops less than 70    -she does see a podiatrist routinely, she does see her eye doctor  She will continue to see her cardiologist, Dr Ramonita [place]t,  She will be doing blood work via them again in July, previous blood work in December showed hemoglobin 13 7, creatinine 0 86, lipids were fine  Last TSH in July 2017 was fine  She is now on Pradaxa rather than Eliquis, no sign of bleeding      Immunization History   Administered Date(s) Administered    Influenza 10/15/2015, 11/01/2017    Influenza Split High Dose Preservative Free IM 09/23/2014, 10/24/2015, 09/27/2016    Influenza TIV (IM) 10/01/2011, 10/01/2012    Pneumococcal 10/18/2015    Pneumococcal Conjugate 13-Valent 03/12/2015    Pneumococcal Polysaccharide PPV23 07/22/2003    Zoster 03/01/2011       Discussed Pneumococcal vaccines,    Prevnar  is up to date   Pneumovax  is up to date  does do yearly Flu shot  If Tdap ( tetanus shot)  is not up-to-date, can look into coverage for it (done every 10 years in general) and also look into coverage for new shingles shot, Shingrix ( even if previously received Zostavax )  Can do those at pharmacy  non-smoker     Regarding Colon Cancer screening, we discussed Colonoscopy vs ColoGuard :  Screening is up to date ( not indicated )      She does not see her Gynecologist routinely  ( not indicated)  Mammogram screening was discussed, is  not indicated  Discussed bone density screening/ DEXA Scan, this is on hold for now  Jose Alfredo Escalante discussed end of life planning, she does have a  "LIVING WILL"       Regarding Hepatitis C Screening,  after discussion she will not do Hepatitis C blood test    not indicated    Glaucoma screening is up-to-date    We did review previous blood work,   She is up to date with Lipid screening  She is up to date with Diabetes screening  Discussed importance of routine exercise, healthy diet     Corrine Keane -  Can see Dermatology for full body skin exam/skin cancer screening,   should see Dentist routinely      We will see her back in 6 months, sooner as needed                  Health Maintenance     Health Maintenance   Topic Date Due    URINE MICROALBUMIN  11/18/1943    DTaP,Tdap,and Td Vaccines (1 - Tdap) 11/18/1954    GLAUCOMA SCREENING 67+ YR  11/18/2000    OPHTHALMOLOGY EXAM  04/19/2017    Diabetic Foot Exam  08/15/2018    Fall Risk  03/20/2019    Depression Screening PHQ-9  03/20/2019    Urinary Incontinence Screening  03/20/2019    INFLUENZA VACCINE  Completed    PNEUMOCOCCAL POLYSACCHARIDE VACCINE AGE 65 AND OVER  Completed       Immunization History   Administered Date(s) Administered    Influenza 10/15/2015, 11/01/2017    Influenza Split High Dose Preservative Free IM 09/23/2014, 10/24/2015, 09/27/2016    Influenza TIV (IM) 10/01/2011, 10/01/2012    Pneumococcal 10/18/2015    Pneumococcal Conjugate 13-Valent 03/12/2015    Pneumococcal Polysaccharide PPV23 07/22/2003    Zoster 03/01/2011       AWV Clinical     ISAR:   Previous hospitalizations?:  No       Once in a Lifetime Medicare Screening:       Medicare Screening Tests and Risk Assessment:   AAA Risk Assessment    Osteoporosis Risk Assessment    HIV Risk Assessment        Drug and Alcohol Use:   Tobacco use    Cigarettes:  never smoker    Tobacco use duration    Tobacco Cessation Readiness    Alcohol use    Alcohol use:  occasional use    Amount of alcohol consumed:  6-8 alcohol per year   Alcohol Treatment Readiness   Illicit Drug Use    Drug use:  never        Diet & Exercise:   Diet   What is your diet?:  Regular, Low Saturated Fat, Low Carb, No Added Salt   How many servings a day of the following:   Fruits and Vegetables:  5 or more Meat:  1-2    Simple Carbs:  1   Dairy:  2 Soda:  1   Coffee:  1 Tea:  1   Exercise    Do you currently exercise?:  yes    Frequency:  daily    Minutes per day:  30   Times per week:  8     Type of exercise:  walking   stretching        Cognitive Impairment Screening:   Cognitive Impairment Screening    Do you have difficulty learning or retaining new information?:  No Do you have difficulty handling new tasks?:  Yes   Do you have difficulty with reasoning?:  No Do you have difficulty with spatial ability and orientation?:  No   Do you have difficulty with language?:  No Do you have difficulty with behavior?:  No       Functional Ability/Level of Safety:   Hearing    Hearing difficulties:  Yes Bilateral:  slightly decreased   Hearing aid:  Yes    Hearing Impairment Assessment    Current Activities    Help needed with the folllowing:    Using the phone:  No Transportation:  Yes   Shopping:  No Preparing Meals:  No   Doing Housework:  Yes Doing Laundry:  No   Managing Medications:  No Managing Money:  No   ADL    Fall Risk Have you fallen in the last 12 months?:  No    Injury History       Home Safety:   Home Safety Risk Factors   Unfamilar with surroundings:  No Uneven floors:  No   Stairs or handrail saftey risk:  No Loose rugs:  No   Household clutter:  Yes Poor household lighting:  No   No grab bars in bathroom:  Yes        Advanced Directives:   Advanced Directives    Living Will:  Yes Durable POA for healthcare:   Yes   Advanced directive:  Yes    Patient's End of Life Decisions        Urinary Incontinence:   Do you have urinary incontinence?:  Yes    Do you urinate frequently?:  Yes Do you have urinary urgency?:  Yes   Do you have nocturia (waking up to urinate)?:  Yes        Glaucoma:                   ### SEE OTHER NOTE TODAY Re:   CC/HPI/ VITALS/ROS/PMHx/PSHx/PE/ALLERGIES/FAMHx/SOCHx ###

## 2018-03-26 DIAGNOSIS — I10 ESSENTIAL HYPERTENSION: ICD-10-CM

## 2018-03-26 RX ORDER — LISINOPRIL 20 MG/1
20 TABLET ORAL DAILY
Qty: 30 TABLET | Refills: 0 | Status: SHIPPED | OUTPATIENT
Start: 2018-03-26 | End: 2019-07-16 | Stop reason: SDUPTHER

## 2018-07-09 ENCOUNTER — APPOINTMENT (OUTPATIENT)
Dept: LAB | Facility: CLINIC | Age: 83
End: 2018-07-09
Payer: MEDICARE

## 2018-07-09 ENCOUNTER — OFFICE VISIT (OUTPATIENT)
Dept: FAMILY MEDICINE CLINIC | Facility: CLINIC | Age: 83
End: 2018-07-09
Payer: MEDICARE

## 2018-07-09 VITALS
HEART RATE: 68 BPM | WEIGHT: 213.25 LBS | SYSTOLIC BLOOD PRESSURE: 120 MMHG | TEMPERATURE: 97.7 F | BODY MASS INDEX: 37.79 KG/M2 | OXYGEN SATURATION: 96 % | DIASTOLIC BLOOD PRESSURE: 70 MMHG | HEIGHT: 63 IN

## 2018-07-09 DIAGNOSIS — I48.20 CHRONIC ATRIAL FIBRILLATION (HCC): ICD-10-CM

## 2018-07-09 DIAGNOSIS — E11.42 TYPE 2 DIABETES MELLITUS WITH DIABETIC POLYNEUROPATHY, WITHOUT LONG-TERM CURRENT USE OF INSULIN (HCC): Primary | ICD-10-CM

## 2018-07-09 DIAGNOSIS — H35.373 BILATERAL EPIRETINAL MEMBRANE: Primary | ICD-10-CM

## 2018-07-09 DIAGNOSIS — H35.372 EPIRETINAL MEMBRANE (ERM) OF LEFT EYE: ICD-10-CM

## 2018-07-09 DIAGNOSIS — I50.32 CHRONIC DIASTOLIC CONGESTIVE HEART FAILURE (HCC): ICD-10-CM

## 2018-07-09 DIAGNOSIS — I10 ESSENTIAL HYPERTENSION: ICD-10-CM

## 2018-07-09 LAB
ANION GAP SERPL CALCULATED.3IONS-SCNC: 6 MMOL/L (ref 4–13)
BUN SERPL-MCNC: 26 MG/DL (ref 5–25)
CALCIUM SERPL-MCNC: 9.8 MG/DL (ref 8.3–10.1)
CHLORIDE SERPL-SCNC: 103 MMOL/L (ref 100–108)
CO2 SERPL-SCNC: 30 MMOL/L (ref 21–32)
CREAT SERPL-MCNC: 1.1 MG/DL (ref 0.6–1.3)
GFR SERPL CREATININE-BSD FRML MDRD: 46 ML/MIN/1.73SQ M
GLUCOSE P FAST SERPL-MCNC: 94 MG/DL (ref 65–99)
POTASSIUM SERPL-SCNC: 5.1 MMOL/L (ref 3.5–5.3)
SODIUM SERPL-SCNC: 139 MMOL/L (ref 136–145)

## 2018-07-09 PROCEDURE — 36415 COLL VENOUS BLD VENIPUNCTURE: CPT

## 2018-07-09 PROCEDURE — 99214 OFFICE O/P EST MOD 30 MIN: CPT | Performed by: FAMILY MEDICINE

## 2018-07-09 PROCEDURE — 80048 BASIC METABOLIC PNL TOTAL CA: CPT

## 2018-07-09 RX ORDER — PENICILLIN V POTASSIUM 500 MG/1
TABLET ORAL AS NEEDED
COMMUNITY
Start: 2018-06-19

## 2018-07-09 NOTE — PATIENT INSTRUCTIONS
Medically stable here today, she can undergo upcoming procedure left eye regarding macular pucker  She is on chronic anticoagulation, she should check with her eye doctor regarding need for holding this  Her blood pressure is fine, no sign increased fluid retention, she will continue on medication as is, she will be seeing Cardiology again next month  Her last A1c in March was 6 5, she does occasionally run low in the morning, was 129 this morning at home  I would like her to hold glimepiride the day prior and the day of her eye surgery but otherwise she can continue this at 1/2 pill daily, call us if running low more frequently, may need to stop  She has a slip to do BMP from her eye doctor, in December BMP, CBC, lipids were fine, last TSH July 2017 was fine        We will continue to see her every 4 months, she will call sooner if needed

## 2018-07-09 NOTE — PROGRESS NOTES
FAMILY PRACTICE OFFICE VISIT  [address] [address]  [address] [facility]  [place], [place], [address]      NAME: Bri Maurice  AGE: 80 y o  SEX: female  : 1933   MRN: [id number]    DATE: 2018  TIME: 7:57 AM    Assessment and Plan     Problem List Items Addressed This Visit        Endocrine    Type 2 diabetes mellitus with diabetic polyneuropathy, without long-term current use of insulin ([address] ) - Primary       Cardiovascular and Mediastinum    Atrial fibrillation ([address] )    Chronic diastolic congestive heart failure ([address] )    Hypertension      Other Visit Diagnoses     Epiretinal membrane (ERM) of left eye              Patient Instructions    Medically stable here today, she can undergo upcoming procedure left eye regarding macular pucker  She is on chronic anticoagulation, she should check with her eye doctor regarding need for holding this  Her blood pressure is fine, no sign increased fluid retention, she will continue on medication as is, she will be seeing Cardiology again next month  Her last A1c in March was 6 5, she does occasionally run low in the morning, was 129 this morning at home  I would like her to hold glimepiride the day prior and the day of her eye surgery but otherwise she can continue this at 1/2 pill daily, call us if running low more frequently, may need to stop  She has a slip to do BMP from her eye doctor, in December BMP, CBC, lipids were fine, last TSH 2017 was fine  We will continue to see her every 4 months, she will call sooner if needed      Chief Complaint     Chief Complaint   Patient presents with    Pre-op Clearance     Eye surgery By Dr Selvin Cifuenets on 2018       History of Present Illness   Bri Maurice is a 80y o -year-old female who Is in today for her 4 month visit, she also will be undergoing eye procedure regarding macular pucker next week, needs form filled out    She has no increased shortness of breath or signs of fluid retention  She is using medication as directed including anticoagulation, no bleeding  Her sugars occasionally run into the upper 60s in the morning, was 129 this morning      Review of Systems   Review of Systems   Constitutional: Negative for activity change, appetite change, chills, fatigue, fever and unexpected weight change  HENT: Negative for congestion, mouth sores, nosebleeds, sinus pressure, sore throat and trouble swallowing  Respiratory: Negative for cough, choking, chest tightness and shortness of breath  Has had no need for rescue inhaler   Cardiovascular: Positive for leg swelling (Chronic mild swelling right lower extremity)  Negative for chest pain and palpitations  Gastrointestinal: Negative for abdominal pain, blood in stool, constipation, diarrhea, nausea and vomiting  No acid reflux     No change in bowel   Genitourinary: Negative for dysuria and hematuria  Neurological: Negative for dizziness, syncope, speech difficulty, weakness, light-headedness and headaches  Hematological: Bruises/bleeds easily  Psychiatric/Behavioral: Negative for behavioral problems, confusion and sleep disturbance         Active Problem List     Patient Active Problem List   Diagnosis    Atrial fibrillation ([facility] )    Bilateral impacted cerumen    Bladder incontinence    Carotid artery stenosis    Type 2 diabetes mellitus with diabetic polyneuropathy, without long-term current use of insulin (HCC)    Coronary arteriosclerosis    Esophageal reflux    Hyperlipidemia    Hypertension    Impacted cerumen of left ear    Lipoma    OA (osteoarthritis)    Overactive bladder    Right lumbar radiculopathy    Seborrheic dermatitis    Tricuspid valve disorders, non-rheumatic    Chronic diastolic congestive heart failure (HCC)    Obesity    Sinus bradycardia    Spinal stenosis of lumbar region       Past Medical History:  Past Medical History:   Diagnosis Date    Atrial fibrillation with rapid ventricular response (HCC)     RESOLVED: [id number]    Lumbar compression fracture (HCC)     RESOLVED: [id number]    Meningocele (HCC)     ACQUIRED SPINAL CORD; SURGERY 1934    Nasal fracture     RESOLVED: [id number]       Past Surgical History:  Past Surgical History:   Procedure Laterality Date    CATARACT EXTRACTION      COLONOSCOPY      ONSET: 1998    HYSTERECTOMY      REPLACEMENT TOTAL KNEE BILATERAL      ONSET: NOV 2011    SCALP EXCISION  1934    LESION;     TONSILLECTOMY      TRANSLUMINAL ANGIOPLASTY      INTRAOPERATIVE        Family History:  Family History   Problem Relation Age of Onset    Eclampsia Mother     Lung cancer Father     Diabetes Son     Breast cancer Family        Social History:  Social History     Social History    Marital status: Single     Spouse name: N/A    Number of children: N/A    Years of education: N/A     Occupational History    Not on file  Social History Main Topics    Smoking status: Never Smoker    Smokeless tobacco: Never Used    Alcohol use No    Drug use: No    Sexual activity: Not on file     Other Topics Concern    Not on file     Social History Narrative    No narrative on file       Objective     Vitals:    07/09/18 0729   BP: 120/70   Pulse: 68   Temp: 97 7 °F (36 5 °C)   SpO2: 96%   Weight: 96 7 kg (213 lb 4 oz)   Height: 5' 3" (1 6 m)     Body mass index is 37 78 kg/m²  BP Readings from Last 3 Encounters:   07/09/18 120/70   03/20/18 106/74   02/19/18 130/62       Wt Readings from Last 3 Encounters:   07/09/18 96 7 kg (213 lb 4 oz)   03/20/18 96 2 kg (212 lb)   02/19/18 95 4 kg (210 lb 6 4 oz)       Physical Exam   Constitutional: She is oriented to person, place, and time  She appears well-developed and well-nourished  No distress  HENT:   Mouth/Throat: Oropharynx is clear and moist  No oropharyngeal exudate  TM clear   Eyes: Conjunctivae are normal  No scleral icterus  Cardiovascular:   Irregularly irregular at controlled rate   Pulmonary/Chest: Effort normal and breath sounds normal  No respiratory distress  Abdominal: Soft  There is no tenderness  Musculoskeletal: She exhibits edema (Slightly pitting chronic right lower extremity edema to lower calf, trace left)  Lymphadenopathy:     She has no cervical adenopathy  Neurological: She is alert and oriented to person, place, and time  No cranial nerve deficit  Psychiatric: She has a normal mood and affect   Her behavior is normal      ALLERGIES:  Allergies   Allergen Reactions    Celecoxib      Reaction Date: 05Dec2011;     Latex     Adhesive [Medical Tape] Rash    Iodinated Diagnostic Agents Rash    Sulfa Antibiotics Rash       Current Medications     Current Outpatient Prescriptions   Medication Sig Dispense Refill    albuterol (VENTOLIN HFA) 90 mcg/act inhaler Inhale 1-2 puffs every 4 (four) hours as needed       apixaban (ELIQUIS) 5 mg Take 5 mg by mouth 2 (two) times a day      atorvastatin (LIPITOR) 80 mg tablet Take 1 tablet by mouth      Cholecalciferol (VITAMIN D) 2000 units CAPS Take by mouth daily       cycloSPORINE (RESTASIS MULTIDOSE) 0 05 % ophthalmic emulsion Apply 1 drop to eye Medrol Dose Pack scheduling ONLY        dabigatran etexilate (PRADAXA) 150 mg capsu Take 1 capsule (150 mg total) by mouth 2 (two) times a day 1 capsule 0    ezetimibe (ZETIA) 10 mg tablet Take 10 mg by mouth daily        furosemide (LASIX) 20 mg tablet Use 2 pills in the morning, 1 in the afternoon or as directed 270 tablet 3    glimepiride (AMARYL) 1 mg tablet Take 0 5 tablets (0 5 mg total) by mouth daily ----- Use 1/2 pill daily 90 tablet 0    lisinopril (ZESTRIL) 20 mg tablet Take 1 tablet (20 mg total) by mouth daily 30 tablet 0    metoprolol tartrate (LOPRESSOR) 100 mg tablet Take 1 tablet (100 mg total) by mouth 2 (two) times a day 90 tablet 3    Multiple Vitamins-Minerals (HAIR SKIN AND NAILS FORMULA) TABS Take by mouth      nitroglycerin (NITROSTAT) 0 4 mg SL tablet Place under the tongue      penicillin V potassium (VEETID) 500 mg tablet as needed For dental procedures  No current facility-administered medications for this visit  Most recent labs available from [address] [place] [address]   ( others may be available in [facility] / Media sections) -   12/17 CBC/BMP/ LIPIDS were fine  A1C 3/18 = 6 5  Lab Results   Component Value Date    WBC 6 33 06/21/2016    HGB 12 7 06/21/2016    HCT 39 1 06/21/2016     06/21/2016    CHOL 175 06/21/2016    TRIG 119 06/21/2016    HDL 59 06/21/2016    ALT 22 06/21/2016    AST 23 06/21/2016     02/24/2017    K 4 4 02/24/2017     02/24/2017    CREATININE 0 97 02/24/2017    BUN 24 02/24/2017    CO2 29 02/24/2017    HGBA1C 6 5 03/20/2018     Lab Results   Component Value Date    LDLCALC 92 06/21/2016     No results found for: TSH   TSH ok 7/17      No orders of the defined types were placed in this encounter          Mohamud Merritt DO

## 2018-07-09 NOTE — PROGRESS NOTES
Arash PLEITEZ  [facility]  [facility]  [address]   [address] [place] [address], [address] MEDICARE SUBSEQUENT VISIT NOTE ( part 1 )      NAME: Lester Vogel  AGE: 80 y o  SEX: female  : 1933   MRN: [id number]    DATE: 2018  TIME: 7:36 AM    Assessment and Plan     Problem List Items Addressed This Visit        Endocrine    Type 2 diabetes mellitus with diabetic polyneuropathy, without long-term current use of insulin ([facility] )       Cardiovascular and Mediastinum    Atrial fibrillation ([facility] )    Chronic diastolic congestive heart failure ([facility] )    Hypertension      Other Visit Diagnoses     Medicare annual wellness visit, subsequent    -  Primary          Medicare Wellness Counseling/ Discussion    Patient Instructions     She is in today for a Subsequent Medicare evaluation/ regular check  ***    Immunization History   Administered Date(s) Administered    Influenza 10/15/2015, 2017    Influenza Split High Dose Preservative Free IM 2014, 10/24/2015, 2016    Influenza TIV (IM) 10/01/2011, 10/01/2012    Pneumococcal 10/18/2015    Pneumococcal Conjugate 13-Valent 2015    Pneumococcal Polysaccharide PPV23 2003    Zoster 2011       Discussed Pneumococcal vaccines,    Prevnar  {imm smart list 2:64989::"is up to date"}   Pneumovax  {imm smart list 2:61799::"is up to date"}  She {DOES/ DOES NOT:91890} do yearly Flu shot  Tdap/tetanus shot {imm smart list 2:25469::"is up to date"}  (done every 10 yrs for superficial cuts, every 5 yrs for deep wounds)   Can also look into coverage for new shingles shot, Shingrix (indicated if over 48years of age ) Can do that at pharmacy  {Smoker?:20168}     Regarding Colon Cancer screening, we discussed Colonoscopy vs ColoGuard :  {Colon Cancer screening smart HQD::" Screening is up to date"}      She {DOES/ DOES CZF:30023} see her Gynecologist routinely    {Overdue Up to date list for screenin}  Mammogram screening was discussed, is  {Overdue Up to date list for screenin}  Discussed bone density screening/ DEXA Scan, this is {Overdue Up to date list for screenin}      We discussed end of life planning, she {DOES/ DOES NOT:09135} have a  "LIVING WILL"   {Living will smart link (Optional):43355::""FIVE WISHES" handout was discussed and given to fill out at home"}    Regarding Hepatitis C Screening,  after discussion she {will/will not:67920} do Hepatitis C blood test   {Hep C smart list:37400}    Glaucoma screening is {Overdue Up to date list for screenin}    We did review previous blood work,   She {is/is not:73140} up to date with Lipid screening  She {is/is not:32211} up to date with Diabetes screening  Discussed importance of routine exercise, healthy diet     Cinthia Guerrero -  Can see Dermatology for full body skin exam/skin cancer screening,   should see Dentist routinely      We will see her back in *** months, sooner as needed                  Chief Complaint     Chief Complaint   Patient presents with    Pre-op Clearance     Eye surgery By Dr Dev Deleon on 2018       History of Present Illness     Anthony Davis is a 80y o -year-old female who ***    Review of Systems     Review of Systems    Active Problem List     Patient Active Problem List   Diagnosis    Atrial fibrillation ([address] )    Bilateral impacted cerumen    Bladder incontinence    Carotid artery stenosis    Type 2 diabetes mellitus with diabetic polyneuropathy, without long-term current use of insulin ([address] )    Coronary arteriosclerosis    Esophageal reflux    Hyperlipidemia    Hypertension    Impacted cerumen of left ear    Lipoma    OA (osteoarthritis)    Overactive bladder    Right lumbar radiculopathy    Seborrheic dermatitis    Tricuspid valve disorders, non-rheumatic    Chronic diastolic congestive heart failure (HCC)    Obesity    Sinus bradycardia    Spinal stenosis of lumbar region       Past Medical History:  Past Medical History:   Diagnosis Date    Atrial fibrillation with rapid ventricular response ([address] )     RESOLVED: [id number]    Lumbar compression fracture (HCC)     RESOLVED: [id number]    Meningocele (HCC)     ACQUIRED SPINAL CORD; SURGERY 1934    Nasal fracture     RESOLVED: [id number]       Past Surgical History:  Past Surgical History:   Procedure Laterality Date    CATARACT EXTRACTION      COLONOSCOPY      ONSET: 1998    HYSTERECTOMY      REPLACEMENT TOTAL KNEE BILATERAL      ONSET: NOV 2011    SCALP EXCISION  1934    LESION;     TONSILLECTOMY      TRANSLUMINAL ANGIOPLASTY      INTRAOPERATIVE        Family History:  Family History   Problem Relation Age of Onset    Eclampsia Mother     Lung cancer Father     Diabetes Son     Breast cancer Family        Social History:  Social History     Social History    Marital status: Single     Spouse name: N/A    Number of children: N/A    Years of education: N/A     Occupational History    Not on file  Social History Main Topics    Smoking status: Never Smoker    Smokeless tobacco: Never Used    Alcohol use No    Drug use: No    Sexual activity: Not on file     Other Topics Concern    Not on file     Social History Narrative    No narrative on file       Objective     Vitals:    07/09/18 0729   BP: 120/70   Pulse: 68   Temp: 97 7 °F (36 5 °C)   SpO2: 96%   Weight: 96 7 kg (213 lb 4 oz)   Height: 5' 3" (1 6 m)     Body mass index is 37 78 kg/m²      BP Readings from Last 3 Encounters:   07/09/18 120/70   03/20/18 106/74   02/19/18 130/62       Wt Readings from Last 3 Encounters:   07/09/18 96 7 kg (213 lb 4 oz)   03/20/18 96 2 kg (212 lb)   02/19/18 95 4 kg (210 lb 6 4 oz)       Physical Exam    ALLERGIES:  Allergies   Allergen Reactions    Celecoxib      Reaction Date: [id number];     Latex     Adhesive [Medical Tape] Rash    Iodinated Diagnostic Agents Rash    Sulfa Antibiotics Rash Current Medications     Current Outpatient Prescriptions   Medication Sig Dispense Refill    albuterol (VENTOLIN HFA) 90 mcg/act inhaler Inhale 1-2 puffs every 4 (four) hours as needed       apixaban (ELIQUIS) 5 mg Take 5 mg by mouth 2 (two) times a day      atorvastatin (LIPITOR) 80 mg tablet Take 1 tablet by mouth      Cholecalciferol (VITAMIN D) 2000 units CAPS Take by mouth daily       cycloSPORINE (RESTASIS MULTIDOSE) 0 05 % ophthalmic emulsion Apply 1 drop to eye Medrol Dose Pack scheduling ONLY        dabigatran etexilate (PRADAXA) 150 mg capsu Take 1 capsule (150 mg total) by mouth 2 (two) times a day 1 capsule 0    ezetimibe (ZETIA) 10 mg tablet Take 10 mg by mouth daily        furosemide (LASIX) 20 mg tablet Use 2 pills in the morning, 1 in the afternoon or as directed 270 tablet 3    glimepiride (AMARYL) 1 mg tablet Take 0 5 tablets (0 5 mg total) by mouth daily ----- Use 1/2 pill daily 90 tablet 0    lisinopril (ZESTRIL) 20 mg tablet Take 1 tablet (20 mg total) by mouth daily 30 tablet 0    metoprolol tartrate (LOPRESSOR) 100 mg tablet Take 1 tablet (100 mg total) by mouth 2 (two) times a day 90 tablet 3    Multiple Vitamins-Minerals (HAIR SKIN AND NAILS FORMULA) TABS Take by mouth      nitroglycerin (NITROSTAT) 0 4 mg SL tablet Place under the tongue      penicillin V potassium (VEETID) 500 mg tablet as needed For dental procedures  No current facility-administered medications for this visit            Health Maintenance     See other note today re clinical AWV info             [facility] [address]   ( others may be available in Care Everywhere / Media sections)  Lab Results   Component Value Date    WBC 6 33 06/21/2016    HGB 12 7 06/21/2016    HCT 39 1 06/21/2016     06/21/2016    CHOL 175 06/21/2016    TRIG 119 06/21/2016    HDL 59 06/21/2016    ALT 22 06/21/2016    AST 23 06/21/2016     02/24/2017    K 4 4 02/24/2017     02/24/2017    CREATININE 0 97 02/24/2017    BUN 24 02/24/2017    CO2 29 02/24/2017    HGBA1C 6 5 03/20/2018     Lab Results   Component Value Date    LDLCALC 92 06/21/2016     No results found for: TSH    No orders of the defined types were placed in this encounter              Deanne Jefferson DO

## 2018-07-30 ENCOUNTER — APPOINTMENT (OUTPATIENT)
Dept: LAB | Facility: CLINIC | Age: 83
End: 2018-07-30
Payer: MEDICARE

## 2018-07-30 DIAGNOSIS — I48.0 PAROXYSMAL ATRIAL FIBRILLATION (HCC): ICD-10-CM

## 2018-07-30 DIAGNOSIS — E78.00 PURE HYPERCHOLESTEROLEMIA: Primary | ICD-10-CM

## 2018-07-30 DIAGNOSIS — I10 ESSENTIAL HYPERTENSION, MALIGNANT: ICD-10-CM

## 2018-07-30 LAB
ALBUMIN SERPL BCP-MCNC: 3.9 G/DL (ref 3.5–5)
ALP SERPL-CCNC: 98 U/L (ref 46–116)
ALT SERPL W P-5'-P-CCNC: 21 U/L (ref 12–78)
ANION GAP SERPL CALCULATED.3IONS-SCNC: 7 MMOL/L (ref 4–13)
AST SERPL W P-5'-P-CCNC: 28 U/L (ref 5–45)
BILIRUB SERPL-MCNC: 1.38 MG/DL (ref 0.2–1)
BUN SERPL-MCNC: 30 MG/DL (ref 5–25)
CALCIUM SERPL-MCNC: 9.3 MG/DL (ref 8.3–10.1)
CHLORIDE SERPL-SCNC: 104 MMOL/L (ref 100–108)
CHOLEST SERPL-MCNC: 137 MG/DL (ref 50–200)
CO2 SERPL-SCNC: 29 MMOL/L (ref 21–32)
CREAT SERPL-MCNC: 1.05 MG/DL (ref 0.6–1.3)
ERYTHROCYTE [DISTWIDTH] IN BLOOD BY AUTOMATED COUNT: 13.4 % (ref 11.6–15.1)
EST. AVERAGE GLUCOSE BLD GHB EST-MCNC: 146 MG/DL
GFR SERPL CREATININE-BSD FRML MDRD: 49 ML/MIN/1.73SQ M
GLUCOSE P FAST SERPL-MCNC: 123 MG/DL (ref 65–99)
HBA1C MFR BLD: 6.7 % (ref 4.2–6.3)
HCT VFR BLD AUTO: 43.6 % (ref 34.8–46.1)
HDLC SERPL-MCNC: 54 MG/DL (ref 40–60)
HGB BLD-MCNC: 14.2 G/DL (ref 11.5–15.4)
LDLC SERPL CALC-MCNC: 62 MG/DL (ref 0–100)
MCH RBC QN AUTO: 32.2 PG (ref 26.8–34.3)
MCHC RBC AUTO-ENTMCNC: 32.6 G/DL (ref 31.4–37.4)
MCV RBC AUTO: 99 FL (ref 82–98)
NONHDLC SERPL-MCNC: 83 MG/DL
PLATELET # BLD AUTO: 236 THOUSANDS/UL (ref 149–390)
PMV BLD AUTO: 9.6 FL (ref 8.9–12.7)
POTASSIUM SERPL-SCNC: 3.9 MMOL/L (ref 3.5–5.3)
PROT SERPL-MCNC: 7.3 G/DL (ref 6.4–8.2)
RBC # BLD AUTO: 4.41 MILLION/UL (ref 3.81–5.12)
SODIUM SERPL-SCNC: 140 MMOL/L (ref 136–145)
TRIGL SERPL-MCNC: 106 MG/DL
WBC # BLD AUTO: 7.16 THOUSAND/UL (ref 4.31–10.16)

## 2018-07-30 PROCEDURE — 80053 COMPREHEN METABOLIC PANEL: CPT

## 2018-07-30 PROCEDURE — 83036 HEMOGLOBIN GLYCOSYLATED A1C: CPT

## 2018-07-30 PROCEDURE — 80061 LIPID PANEL: CPT

## 2018-07-30 PROCEDURE — 36415 COLL VENOUS BLD VENIPUNCTURE: CPT

## 2018-07-30 PROCEDURE — 85027 COMPLETE CBC AUTOMATED: CPT

## 2018-11-09 DIAGNOSIS — I48.20 CHRONIC ATRIAL FIBRILLATION (HCC): ICD-10-CM

## 2018-11-09 RX ORDER — METOPROLOL TARTRATE 100 MG/1
TABLET ORAL
Qty: 90 TABLET | Refills: 3 | Status: SHIPPED | OUTPATIENT
Start: 2018-11-09 | End: 2019-05-20 | Stop reason: SDUPTHER

## 2018-11-12 PROBLEM — I34.81 MITRAL ANNULAR CALCIFICATION: Status: ACTIVE | Noted: 2018-08-01

## 2018-11-12 PROBLEM — Z86.19 H/O SCARLET FEVER: Status: ACTIVE | Noted: 2018-08-01

## 2018-11-12 PROBLEM — I05.9 MITRAL ANNULAR CALCIFICATION: Status: ACTIVE | Noted: 2018-08-01

## 2018-11-12 RX ORDER — CEPHALEXIN 500 MG/1
CAPSULE ORAL
COMMUNITY
Start: 2018-11-01 | End: 2019-07-16 | Stop reason: ALTCHOICE

## 2018-11-13 ENCOUNTER — OFFICE VISIT (OUTPATIENT)
Dept: FAMILY MEDICINE CLINIC | Facility: CLINIC | Age: 83
End: 2018-11-13
Payer: MEDICARE

## 2018-11-13 VITALS
SYSTOLIC BLOOD PRESSURE: 128 MMHG | OXYGEN SATURATION: 98 % | TEMPERATURE: 96.2 F | HEART RATE: 91 BPM | HEIGHT: 63 IN | BODY MASS INDEX: 37.63 KG/M2 | WEIGHT: 212.4 LBS | DIASTOLIC BLOOD PRESSURE: 78 MMHG

## 2018-11-13 DIAGNOSIS — E11.42 TYPE 2 DIABETES MELLITUS WITH DIABETIC POLYNEUROPATHY, WITHOUT LONG-TERM CURRENT USE OF INSULIN (HCC): Primary | ICD-10-CM

## 2018-11-13 DIAGNOSIS — Z23 NEED FOR INFLUENZA VACCINATION: ICD-10-CM

## 2018-11-13 DIAGNOSIS — I48.20 CHRONIC ATRIAL FIBRILLATION (HCC): ICD-10-CM

## 2018-11-13 DIAGNOSIS — I50.32 CHRONIC DIASTOLIC CONGESTIVE HEART FAILURE (HCC): ICD-10-CM

## 2018-11-13 DIAGNOSIS — Z79.01 CHRONIC ANTICOAGULATION: ICD-10-CM

## 2018-11-13 DIAGNOSIS — I10 ESSENTIAL HYPERTENSION: ICD-10-CM

## 2018-11-13 DIAGNOSIS — I25.10 CORONARY ARTERIOSCLEROSIS: ICD-10-CM

## 2018-11-13 LAB — SL AMB POCT HEMOGLOBIN AIC: 6.5

## 2018-11-13 PROCEDURE — 90662 IIV NO PRSV INCREASED AG IM: CPT | Performed by: FAMILY MEDICINE

## 2018-11-13 PROCEDURE — G0008 ADMIN INFLUENZA VIRUS VAC: HCPCS | Performed by: FAMILY MEDICINE

## 2018-11-13 PROCEDURE — 83036 HEMOGLOBIN GLYCOSYLATED A1C: CPT | Performed by: FAMILY MEDICINE

## 2018-11-13 PROCEDURE — 99214 OFFICE O/P EST MOD 30 MIN: CPT | Performed by: FAMILY MEDICINE

## 2018-11-13 NOTE — PATIENT INSTRUCTIONS
She appears to be medically stable here today, we did review her emergency room visit at [facility] November 1st, she was treated for presumed UTI with Keflex 500 mg 4 times daily x1 week, she is back to baseline  CBC, CMP were fine at the emergency room, urine was positive for nitrite  BNP 2392  She will be continuing to see Cardiology every 6 months, continues on anticoagulation with Eliquis via them, no bleeding  She will continue to see her eye doctor regarding change in vision/double vision left eye  She does continue to see Podiatry  Last A1c 6 7 back in July, redo here today is 6 5  She will continue with  low-dose glimepiride as is  She can continue to use Ventolin inhaler as needed  Flu shot given here today        We will continue to see her every 4 months, she will call sooner if needed

## 2018-11-13 NOTE — PROGRESS NOTES
FAMILY PRACTICE OFFICE VISIT  [address]  [address], [address]      NAME: Shanda Henry  AGE: 80 y o  SEX: female  : 1933   MRN: [id number]    DATE: 2018  TIME: 10:17 AM    Assessment and Plan     Problem List Items Addressed This Visit        Unprioritized    Atrial fibrillation ([facility] )    Chronic anticoagulation    Chronic diastolic congestive heart failure ([facility] )    Coronary arteriosclerosis    Essential hypertension    Type 2 diabetes mellitus with diabetic polyneuropathy, without long-term current use of insulin ([facility] ) - Primary    Relevant Orders    POCT hemoglobin A1c (Completed)      Other Visit Diagnoses     Need for influenza vaccination        Relevant Orders    influenza vaccine, [id number], high-dose, PF 0 5 mL, for patients 65 yr+ (FLUZONE HIGH-DOSE) (Completed)          Patient Instructions   She appears to be medically stable here today, we did review her emergency room visit at [facility] , she was treated for presumed UTI with Keflex 500 mg 4 times daily x1 week, she is back to baseline  CBC, CMP were fine at the emergency room, urine was positive for nitrite  BNP 2392  She will be continuing to see Cardiology every 6 months, continues on anticoagulation with Eliquis via them, no bleeding  She will continue to see her eye doctor regarding change in vision/double vision left eye  She does continue to see Podiatry  Last A1c 6 7 back in July, redo here today is 6 5  She will continue with  low-dose glimepiride as is  She can continue to use Ventolin inhaler as needed  Flu shot given here today        We will continue to see her every 4 months, she will call sooner if needed      Chief Complaint     Chief Complaint   Patient presents with    Follow-up     4 month check up to DM        History of Present Illness   Shanda Henry is a 80y o -year-old female who is in today for a routine 4 month follow-up, she relates today she is feeling well but she did have emergency room visit at [facility] earlier this month  Continues with medication as before, continues to see Cardiology, anticoagulated via them, no bleeding  Review of Systems   Review of Systems   Constitutional: Negative for appetite change, fatigue, fever and unexpected weight change  HENT: Negative for sore throat and trouble swallowing  Eyes:        She did undergo procedure on I after July visit, improved vision but does have degree double vision left eye, continues to see eye specialist    Respiratory: Positive for shortness of breath and wheezing  Negative for cough and chest tightness  Her cough, wheezing, dyspnea on exertion or about the same as at baseline, uses rescue inhaler at least few times weekly prior to walking  Cardiovascular: Positive for palpitations (Long history atrial fibrillation, stable) and leg swelling (Chronic right, as at baseline)  Negative for chest pain  She had seen Cardiology back in August, did have issues obtaining Eliquis but that has resolved, continues on medication without bleeding  Gastrointestinal: Negative for abdominal pain, blood in stool, nausea and vomiting  No acid reflux     No change in bowel   Endocrine:        Sugar postprandial 149 range  Last A1c back in March 6 5, 6 7 in July   Genitourinary: Negative for dysuria and hematuria  Had emergency room visit earlier this month with [facility], nitrite positive urine dip, treated for UTI with Keflex 500 mg 4 times daily x7 days, symptoms resolved   Musculoskeletal: Positive for back pain  No falls   Neurological: Negative for dizziness, syncope, light-headedness and headaches  Hematological: Bruises/bleeds easily  Psychiatric/Behavioral: Negative for behavioral problems and confusion         Active Problem List     Patient Active Problem List   Diagnosis  Atrial fibrillation (HCC)    Bilateral impacted cerumen    Bladder incontinence    Carotid artery stenosis    Type 2 diabetes mellitus with diabetic polyneuropathy, without long-term current use of insulin (HCC)    Coronary arteriosclerosis    Esophageal reflux    Hyperlipidemia    Essential hypertension    Impacted cerumen of left ear    Lipoma    OA (osteoarthritis)    Overactive bladder    Right lumbar radiculopathy    Seborrheic dermatitis    Tricuspid valve disorders, non-rheumatic    Chronic diastolic congestive heart failure (HCC)    Obesity    Sinus bradycardia    Spinal stenosis of lumbar region    H/O scarlet fever    Mitral annular calcification    Chronic anticoagulation       Past Medical History:  Past Medical History:   Diagnosis Date    Atrial fibrillation with rapid ventricular response (HCC)     RESOLVED: [id number]    Lumbar compression fracture (HCC)     RESOLVED: [id number]    Meningocele (HCC)     ACQUIRED SPINAL CORD; SURGERY 1934    Nasal fracture     RESOLVED: [id number]       Past Surgical History:  Past Surgical History:   Procedure Laterality Date    CATARACT EXTRACTION      COLONOSCOPY      ONSET: 1998    HYSTERECTOMY      REPLACEMENT TOTAL KNEE BILATERAL      ONSET: NOV 2011    SCALP EXCISION  1934    LESION;     TONSILLECTOMY      TRANSLUMINAL ANGIOPLASTY      INTRAOPERATIVE        Family History:  Family History   Problem Relation Age of Onset    Eclampsia Mother     Lung cancer Father     Diabetes Son     Breast cancer Family        Social History:  Social History     Social History    Marital status: Single     Spouse name: N/A    Number of children: N/A    Years of education: N/A     Occupational History    Not on file       Social History Main Topics    Smoking status: Never Smoker    Smokeless tobacco: Never Used    Alcohol use No    Drug use: No    Sexual activity: Not on file     Other Topics Concern    Not on file     Social History Narrative    No narrative on file       Objective     Vitals:    11/13/18 0913   BP: 128/78   BP Location: Left arm   Patient Position: Sitting   Cuff Size: Large   Pulse: 91   Temp: (!) 96 2 °F (35 7 °C)   SpO2: 98%   Weight: 96 3 kg (212 lb 6 4 oz)   Height: 5' 3" (1 6 m)     Body mass index is 37 62 kg/m²  BP Readings from Last 3 Encounters:   11/13/18 128/78   07/09/18 120/70   03/20/18 106/74       Wt Readings from Last 3 Encounters:   11/13/18 96 3 kg (212 lb 6 4 oz)   07/09/18 96 7 kg (213 lb 4 oz)   03/20/18 96 2 kg (212 lb)       Physical Exam   Constitutional: She is oriented to person, place, and time  She appears well-developed and well-nourished  No distress  Appears as at baseline, arises from chair seats self  on table without issue   HENT:   Mouth/Throat: Oropharynx is clear and moist  No oropharyngeal exudate  TM clear   Eyes: Conjunctivae are normal  No scleral icterus  Neck: Neck supple  Cardiovascular:   Irregularly irregular at controlled rate   Pulmonary/Chest: Effort normal and breath sounds normal  No respiratory distress  Abdominal: Soft  There is no tenderness  Musculoskeletal: She exhibits edema (Slight pitting chronic right lower extremity)  Nontender thoracolumbar here today   Lymphadenopathy:     She has no cervical adenopathy  Neurological: She is alert and oriented to person, place, and time  Psychiatric: She has a normal mood and affect   Her behavior is normal        ALLERGIES:  Allergies   Allergen Reactions    Celecoxib      Reaction Date: 05Dec2011;     Latex     Adhesive [Medical Tape] Rash    Iodinated Diagnostic Agents Rash    Sulfa Antibiotics Rash       Current Medications     Current Outpatient Prescriptions   Medication Sig Dispense Refill    apixaban (ELIQUIS) 5 mg Take 5 mg by mouth 2 (two) times a day      atorvastatin (LIPITOR) 80 mg tablet Take 1 tablet by mouth      Cholecalciferol (VITAMIN D) 2000 units CAPS Take by mouth daily  cycloSPORINE (RESTASIS MULTIDOSE) 0 05 % ophthalmic emulsion Apply 1 drop to eye Medrol Dose Pack scheduling ONLY        ezetimibe (ZETIA) 10 mg tablet Take 10 mg by mouth daily        furosemide (LASIX) 20 mg tablet Use 2 pills in the morning, 1 in the afternoon or as directed 270 tablet 3    glimepiride (AMARYL) 1 mg tablet Take 0 5 tablets (0 5 mg total) by mouth daily ----- Use 1/2 pill daily 90 tablet 0    lisinopril (ZESTRIL) 20 mg tablet Take 1 tablet (20 mg total) by mouth daily 30 tablet 0    metoprolol tartrate (LOPRESSOR) 100 mg tablet TAKE 1 TABLET TWICE A DAY  (CARDIOLOGY HAS PATIENT ON 100MG TWICE DAILY) 90 tablet 3    Multiple Vitamins-Minerals (HAIR SKIN AND NAILS FORMULA) TABS Take by mouth      penicillin V potassium (VEETID) 500 mg tablet as needed For dental procedures   albuterol (VENTOLIN HFA) 90 mcg/act inhaler Inhale 1-2 puffs every 4 (four) hours as needed       cephalexin (KEFLEX) 500 mg capsule       nitroglycerin (NITROSTAT) 0 4 mg SL tablet Place under the tongue       No current facility-administered medications for this visit                Most recent labs available from [address]   ( others may be available in Care Everywhere / Media sections)  Lab Results   Component Value Date    WBC 7 16 07/30/2018    HGB 14 2 07/30/2018    HCT 43 6 07/30/2018     07/30/2018    CHOL 186 12/01/2015    TRIG 106 07/30/2018    HDL 54 07/30/2018    ALT 21 07/30/2018    AST 28 07/30/2018     12/01/2015    K 3 9 07/30/2018     07/30/2018    CREATININE 1 05 07/30/2018    BUN 30 (H) 07/30/2018    CO2 29 07/30/2018    GLUF 123 (H) 07/30/2018    HGBA1C 6 5 11/13/2018     Lab Results   Component Value Date    LDLCALC 62 07/30/2018     Lab Results   Component Value Date    [id number] 1 878 08/20/2013         Orders Placed This Encounter   Procedures    influenza vaccine, [id number], high-dose, PF 0 5 mL, for patients 65 yr+ (FLUZONE HIGH-DOSE)    POCT hemoglobin A1c         Marlis Nissen, DO

## 2018-12-31 DIAGNOSIS — E11.9 CONTROLLED TYPE 2 DIABETES MELLITUS WITHOUT COMPLICATION, WITHOUT LONG-TERM CURRENT USE OF INSULIN (HCC): ICD-10-CM

## 2018-12-31 RX ORDER — GLIMEPIRIDE 1 MG/1
TABLET ORAL
Qty: 45 TABLET | Refills: 3 | Status: SHIPPED | OUTPATIENT
Start: 2018-12-31 | End: 2019-07-16 | Stop reason: ALTCHOICE

## 2019-02-26 ENCOUNTER — APPOINTMENT (OUTPATIENT)
Dept: LAB | Facility: CLINIC | Age: 84
End: 2019-02-26
Payer: MEDICARE

## 2019-02-26 DIAGNOSIS — I50.32 CHRONIC DIASTOLIC HEART FAILURE (HCC): ICD-10-CM

## 2019-02-26 DIAGNOSIS — E13.8 DIABETES MELLITUS OF OTHER TYPE WITH COMPLICATION, UNSPECIFIED WHETHER LONG TERM INSULIN USE: ICD-10-CM

## 2019-02-26 DIAGNOSIS — I05.9 RHEUMATIC DISEASE OF MITRAL VALVE: ICD-10-CM

## 2019-02-26 DIAGNOSIS — I10 ESSENTIAL HYPERTENSION, MALIGNANT: ICD-10-CM

## 2019-02-26 DIAGNOSIS — I48.0 PAROXYSMAL ATRIAL FIBRILLATION (HCC): ICD-10-CM

## 2019-02-26 DIAGNOSIS — E78.00 PURE HYPERCHOLESTEROLEMIA: Primary | ICD-10-CM

## 2019-02-26 LAB
ALBUMIN SERPL BCP-MCNC: 3.9 G/DL (ref 3.5–5)
ALP SERPL-CCNC: 115 U/L (ref 46–116)
ALT SERPL W P-5'-P-CCNC: 26 U/L (ref 12–78)
ANION GAP SERPL CALCULATED.3IONS-SCNC: 5 MMOL/L (ref 4–13)
AST SERPL W P-5'-P-CCNC: 34 U/L (ref 5–45)
BASOPHILS # BLD AUTO: 0.04 THOUSANDS/ΜL (ref 0–0.1)
BASOPHILS NFR BLD AUTO: 1 % (ref 0–1)
BILIRUB SERPL-MCNC: 1.03 MG/DL (ref 0.2–1)
BUN SERPL-MCNC: 22 MG/DL (ref 5–25)
CALCIUM SERPL-MCNC: 9.5 MG/DL (ref 8.3–10.1)
CHLORIDE SERPL-SCNC: 105 MMOL/L (ref 100–108)
CHOLEST SERPL-MCNC: 140 MG/DL (ref 50–200)
CO2 SERPL-SCNC: 30 MMOL/L (ref 21–32)
CREAT SERPL-MCNC: 0.99 MG/DL (ref 0.6–1.3)
EOSINOPHIL # BLD AUTO: 0.18 THOUSAND/ΜL (ref 0–0.61)
EOSINOPHIL NFR BLD AUTO: 2 % (ref 0–6)
ERYTHROCYTE [DISTWIDTH] IN BLOOD BY AUTOMATED COUNT: 13.2 % (ref 11.6–15.1)
GFR SERPL CREATININE-BSD FRML MDRD: 52 ML/MIN/1.73SQ M
GLUCOSE P FAST SERPL-MCNC: 106 MG/DL (ref 65–99)
HCT VFR BLD AUTO: 42.9 % (ref 34.8–46.1)
HDLC SERPL-MCNC: 52 MG/DL (ref 40–60)
HGB BLD-MCNC: 14 G/DL (ref 11.5–15.4)
IMM GRANULOCYTES # BLD AUTO: 0.02 THOUSAND/UL (ref 0–0.2)
IMM GRANULOCYTES NFR BLD AUTO: 0 % (ref 0–2)
LDLC SERPL CALC-MCNC: 57 MG/DL (ref 0–100)
LYMPHOCYTES # BLD AUTO: 2.28 THOUSANDS/ΜL (ref 0.6–4.47)
LYMPHOCYTES NFR BLD AUTO: 28 % (ref 14–44)
MCH RBC QN AUTO: 32.5 PG (ref 26.8–34.3)
MCHC RBC AUTO-ENTMCNC: 32.6 G/DL (ref 31.4–37.4)
MCV RBC AUTO: 100 FL (ref 82–98)
MONOCYTES # BLD AUTO: 0.66 THOUSAND/ΜL (ref 0.17–1.22)
MONOCYTES NFR BLD AUTO: 8 % (ref 4–12)
NEUTROPHILS # BLD AUTO: 4.87 THOUSANDS/ΜL (ref 1.85–7.62)
NEUTS SEG NFR BLD AUTO: 61 % (ref 43–75)
NONHDLC SERPL-MCNC: 88 MG/DL
NRBC BLD AUTO-RTO: 0 /100 WBCS
PLATELET # BLD AUTO: 214 THOUSANDS/UL (ref 149–390)
PMV BLD AUTO: 9.7 FL (ref 8.9–12.7)
POTASSIUM SERPL-SCNC: 4.8 MMOL/L (ref 3.5–5.3)
PROT SERPL-MCNC: 7 G/DL (ref 6.4–8.2)
RBC # BLD AUTO: 4.31 MILLION/UL (ref 3.81–5.12)
SODIUM SERPL-SCNC: 140 MMOL/L (ref 136–145)
TRIGL SERPL-MCNC: 157 MG/DL
WBC # BLD AUTO: 8.05 THOUSAND/UL (ref 4.31–10.16)

## 2019-02-26 PROCEDURE — 36415 COLL VENOUS BLD VENIPUNCTURE: CPT

## 2019-02-26 PROCEDURE — 80053 COMPREHEN METABOLIC PANEL: CPT

## 2019-02-26 PROCEDURE — 85025 COMPLETE CBC W/AUTO DIFF WBC: CPT

## 2019-02-26 PROCEDURE — 80061 LIPID PANEL: CPT

## 2019-03-15 DIAGNOSIS — I50.32 CHRONIC DIASTOLIC CONGESTIVE HEART FAILURE (HCC): ICD-10-CM

## 2019-03-15 RX ORDER — FUROSEMIDE 20 MG/1
TABLET ORAL
Qty: 270 TABLET | Refills: 3 | Status: SHIPPED | OUTPATIENT
Start: 2019-03-15 | End: 2019-03-19 | Stop reason: ALTCHOICE

## 2019-03-19 ENCOUNTER — OFFICE VISIT (OUTPATIENT)
Dept: FAMILY MEDICINE CLINIC | Facility: CLINIC | Age: 84
End: 2019-03-19
Payer: MEDICARE

## 2019-03-19 VITALS
DIASTOLIC BLOOD PRESSURE: 78 MMHG | SYSTOLIC BLOOD PRESSURE: 124 MMHG | BODY MASS INDEX: 37.53 KG/M2 | HEIGHT: 63 IN | OXYGEN SATURATION: 97 % | WEIGHT: 211.8 LBS | HEART RATE: 72 BPM

## 2019-03-19 DIAGNOSIS — Z79.01 CHRONIC ANTICOAGULATION: ICD-10-CM

## 2019-03-19 DIAGNOSIS — I25.10 CORONARY ARTERIOSCLEROSIS: ICD-10-CM

## 2019-03-19 DIAGNOSIS — E11.42 TYPE 2 DIABETES MELLITUS WITH DIABETIC POLYNEUROPATHY, WITHOUT LONG-TERM CURRENT USE OF INSULIN (HCC): ICD-10-CM

## 2019-03-19 DIAGNOSIS — I48.20 CHRONIC ATRIAL FIBRILLATION (HCC): Primary | ICD-10-CM

## 2019-03-19 DIAGNOSIS — I10 ESSENTIAL HYPERTENSION: ICD-10-CM

## 2019-03-19 DIAGNOSIS — I50.42 CHRONIC COMBINED SYSTOLIC AND DIASTOLIC CONGESTIVE HEART FAILURE (HCC): ICD-10-CM

## 2019-03-19 LAB
LEFT EYE DIABETIC RETINOPATHY: NORMAL
RIGHT EYE DIABETIC RETINOPATHY: NORMAL
SL AMB POCT HEMOGLOBIN AIC: 6.6 (ref ?–6.5)

## 2019-03-19 PROCEDURE — 99214 OFFICE O/P EST MOD 30 MIN: CPT | Performed by: FAMILY MEDICINE

## 2019-03-19 PROCEDURE — 83036 HEMOGLOBIN GLYCOSYLATED A1C: CPT | Performed by: FAMILY MEDICINE

## 2019-03-19 NOTE — PROGRESS NOTES
FAMILY PRACTICE OFFICE VISIT  S[address]  [address], [address]      NAME: Lucina Dasilva  AGE: 80 y o  SEX: female  : 1933   MRN: [id number]    DATE: 3/19/2019  TIME: 10:14 AM    Assessment and Plan     Problem List Items Addressed This Visit        Endocrine    Type 2 diabetes mellitus with diabetic polyneuropathy, without long-term current use of insulin ([facility] )     She is doing well here today, blood pressure is excellent, home sugar readings have been in the 130 range  Redo A1c here today 6 6 ( previously 6 5 in November )  She will continue on low-dose glimepiride 0 5 mg daily watching for low readings  She does see Podiatry along with eye doctor  Cardiovascular and Mediastinum    Atrial fibrillation ([facility] ) - Primary     Anticoagulated with Eliquis, rate control with metoprolol  Chronic combined systolic and diastolic congestive heart failure [facility])       We reviewed her visit with Cardiology late February, no change in medication, continues on anticoagulation/Eliquis, watch for bleeding  She did have a fall recently, tripped while pushing food cart, fortunately no significant injury  No sign of increased fluid retention, she will continue on furosemide 20 mg 2 pills in the morning, 1 in the afternoon  Also continue with lisinopril 20 mg daily, metoprolol tartrate 100 mg twice daily  Coronary arteriosclerosis    Essential hypertension       Other    Chronic anticoagulation          Chief Complaint     Chief Complaint   Patient presents with    Follow-up     4 month check up        History of Present Illness   Lucina Dasilva is a 80y o -year-old female who is in today for a routine 4 month check, overall she has been feeling well, does note urinary frequency with using diuretic but is on all medication as directed including anticoagulant    She did see Cardiology in February, no change in care  She was pushing a food cart recently, tripped and fell, fortunately no significant injury, EMS helped her up, no head trauma  Review of Systems   Review of Systems   Constitutional: Negative for activity change, appetite change, fatigue, fever and unexpected weight change  HENT: Negative for sore throat and trouble swallowing  Respiratory: Positive for shortness of breath (Mild chronic, uses inhaler twice a month or so)  Negative for cough and chest tightness  Cardiovascular: Positive for palpitations (Mild as at baseline) and leg swelling (No worse than baseline)  Negative for chest pain  Gastrointestinal: Negative for abdominal pain, blood in stool, nausea and vomiting  No acid reflux     No change in bowel   Genitourinary: Negative for dysuria and hematuria  Neurological: Negative for dizziness, syncope, light-headedness and headaches  Hematological: Bruises/bleeds easily (No bleeding)  Psychiatric/Behavioral: Negative for behavioral problems and confusion         Active Problem List     Patient Active Problem List   Diagnosis    Atrial fibrillation ([facility] )    Bilateral impacted cerumen    Bladder incontinence    Carotid artery stenosis    Type 2 diabetes mellitus with diabetic polyneuropathy, without long-term current use of insulin (HCC)    Coronary arteriosclerosis    Esophageal reflux    Hyperlipidemia    Essential hypertension    Impacted cerumen of left ear    Lipoma    OA (osteoarthritis)    Overactive bladder    Right lumbar radiculopathy    Seborrheic dermatitis    Tricuspid valve disorders, non-rheumatic    Chronic combined systolic and diastolic congestive heart failure (HCC)    Obesity    Sinus bradycardia    Spinal stenosis of lumbar region    H/O scarlet fever    Mitral annular calcification    Chronic anticoagulation       Past Medical History:  Past Medical History:   Diagnosis Date    Atrial fibrillation with rapid ventricular response ([address] )     RESOLVED: [id number]    Lumbar compression fracture (HCC)     RESOLVED: [id number]    Meningocele (HCC)     ACQUIRED SPINAL CORD; SURGERY 1934    Nasal fracture     RESOLVED: [id number]       Past Surgical History:  Past Surgical History:   Procedure Laterality Date    CATARACT EXTRACTION      COLONOSCOPY      ONSET: 1998    HYSTERECTOMY      REPLACEMENT TOTAL KNEE BILATERAL      ONSET: NOV 2011    SCALP EXCISION  1934    LESION;     TONSILLECTOMY      TRANSLUMINAL ANGIOPLASTY      INTRAOPERATIVE        Family History:  Family History   Problem Relation Age of Onset    Eclampsia Mother     Lung cancer Father     Diabetes Son     Breast cancer Family        Social History:  Social History     Tobacco Use    Smoking status: Never Smoker    Smokeless tobacco: Never Used   Substance Use Topics    Alcohol use: No       Objective     Vitals:    03/19/19 0921   BP: 124/78   BP Location: Left arm   Patient Position: Sitting   Cuff Size: Large   Pulse: 72   SpO2: 97%   Weight: 96 1 kg (211 lb 12 8 oz)   Height: 5' 3" (1 6 m)     Body mass index is 37 52 kg/m²  BP Readings from Last 3 Encounters:   03/19/19 124/78   11/13/18 128/78   07/09/18 120/70       Wt Readings from Last 3 Encounters:   03/19/19 96 1 kg (211 lb 12 8 oz)   11/13/18 96 3 kg (212 lb 6 4 oz)   07/09/18 96 7 kg (213 lb 4 oz)       Physical Exam   Constitutional: She is oriented to person, place, and time  No distress  Very pleasant obese female   HENT:   Mouth/Throat: Oropharynx is clear and moist    Eyes: Conjunctivae are normal  No scleral icterus  Neck: Neck supple  Carotid bruit is not present  Nontender   Cardiovascular:   Irregularly irregular at controlled rate   Pulmonary/Chest: Effort normal and breath sounds normal  No respiratory distress  She has no wheezes  She has no rales  Abdominal: Soft  There is no tenderness  Musculoskeletal: She exhibits edema (Chronic slightly pitting  )  Lymphadenopathy:     She has no cervical adenopathy  Neurological: She is alert and oriented to person, place, and time  Psychiatric: She has a normal mood and affect  Her behavior is normal        ALLERGIES:  Allergies   Allergen Reactions    Celecoxib      Reaction Date: [id number];     Latex     Adhesive [Medical Tape] Rash    Iodinated Diagnostic Agents Rash    Sulfa Antibiotics Rash       Current Medications     Current Outpatient Medications   Medication Sig Dispense Refill    apixaban (ELIQUIS) 5 mg Take 5 mg by mouth 2 (two) times a day      atorvastatin (LIPITOR) 80 mg tablet Take 1 tablet by mouth      Cholecalciferol (VITAMIN D) 2000 units CAPS Take by mouth daily       cycloSPORINE (RESTASIS MULTIDOSE) 0 05 % ophthalmic emulsion Apply 1 drop to eye Medrol Dose Pack scheduling ONLY        ezetimibe (ZETIA) 10 mg tablet Take 10 mg by mouth daily        furosemide (LASIX) 20 mg tablet Use 2 pills in the morning, 1 in the afternoon or as directed 270 tablet 3    glimepiride (AMARYL) 1 mg tablet TAKE 1/2 TABLET DAILY      (LOWER DOSE) 45 tablet 3    lisinopril (ZESTRIL) 20 mg tablet Take 1 tablet (20 mg total) by mouth daily 30 tablet 0    metoprolol tartrate (LOPRESSOR) 100 mg tablet TAKE 1 TABLET TWICE A DAY  (CARDIOLOGY HAS PATIENT ON 100MG TWICE DAILY) 90 tablet 3    Multiple Vitamins-Minerals (HAIR SKIN AND NAILS FORMULA) TABS Take by mouth      albuterol (VENTOLIN HFA) 90 mcg/act inhaler Inhale 1-2 puffs every 4 (four) hours as needed       cephalexin (KEFLEX) 500 mg capsule       nitroglycerin (NITROSTAT) 0 4 mg SL tablet Place under the tongue      penicillin V potassium (VEETID) 500 mg tablet as needed For dental procedures  No current facility-administered medications for this visit                Most recent labs available from [address] [place] [address]   ( others may be available in [email or web address] / Media sections)  Lab Results   Component Value Date    WBC 8 05 02/26/2019    HGB 14 0 02/26/2019    HCT 42 9 02/26/2019     02/26/2019    CHOL 186 12/01/2015    TRIG 157 (H) 02/26/2019    HDL 52 02/26/2019    ALT 26 02/26/2019    AST 34 02/26/2019     12/01/2015    K 4 8 02/26/2019     02/26/2019    CREATININE 0 99 02/26/2019    BUN 22 02/26/2019    CO2 30 02/26/2019    GLUF 106 (H) 02/26/2019    HGBA1C 6 5 11/13/2018     Lab Results   Component Value Date    LDLCALC 57 02/26/2019     Lab Results   Component Value Date    NXH1GFDTAMRQ 1 878 08/20/2013         No orders of the defined types were placed in this encounter          Donna Haney DO

## 2019-03-19 NOTE — ASSESSMENT & PLAN NOTE
We reviewed her visit with Cardiology late February, no change in medication, continues on anticoagulation/Eliquis, watch for bleeding  She did have a fall recently, tripped while pushing food cart, fortunately no significant injury  No sign of increased fluid retention, she will continue on furosemide 20 mg 2 pills in the morning, 1 in the afternoon  Also continue with lisinopril 20 mg daily, metoprolol tartrate 100 mg twice daily

## 2019-03-19 NOTE — ASSESSMENT & PLAN NOTE
She is doing well here today, blood pressure is excellent, home sugar readings have been in the 130 range  Redo A1c here today 6 6 ( previously 6 5 in November )  She will continue on low-dose glimepiride 0 5 mg daily watching for low readings  She does see Podiatry along with eye doctor

## 2019-03-19 NOTE — PATIENT INSTRUCTIONS
She is doing well here today, blood pressure is excellent, home sugar readings have been in the 130 range  Redo A1c here today 6 6 ( previously 6 5 in November )  She will continue on low-dose glimepiride 0 5 mg daily watching for low readings  She does see Podiatry along with eye doctor  We reviewed her visit with Cardiology late February, no change in medication, continues on anticoagulation/Eliquis, watch for bleeding  She did have a fall recently, tripped while pushing food cart, fortunately no significant injury  No sign of increased fluid retention, she will continue on furosemide 20 mg 2 pills in the morning, 1 in the afternoon  Also continue with lisinopril 20 mg daily, metoprolol tartrate 100 mg twice daily  Use other medication as is  Feb BW = Chol 140/52/57,  CBC/ CMP stable  We will continue to see her every 4 months -  Next w AWV

## 2019-03-22 DIAGNOSIS — E11.42 TYPE 2 DIABETES MELLITUS WITH DIABETIC POLYNEUROPATHY, WITHOUT LONG-TERM CURRENT USE OF INSULIN (HCC): Primary | ICD-10-CM

## 2019-04-12 DIAGNOSIS — I10 ESSENTIAL HYPERTENSION: ICD-10-CM

## 2019-04-12 RX ORDER — LISINOPRIL 20 MG/1
TABLET ORAL
Qty: 90 TABLET | Refills: 3 | Status: SHIPPED | OUTPATIENT
Start: 2019-04-12 | End: 2020-04-09

## 2019-05-20 DIAGNOSIS — I48.20 CHRONIC ATRIAL FIBRILLATION (HCC): ICD-10-CM

## 2019-05-20 RX ORDER — METOPROLOL TARTRATE 100 MG/1
TABLET ORAL
Qty: 90 TABLET | Refills: 3 | Status: SHIPPED | OUTPATIENT
Start: 2019-05-20 | End: 2019-12-04 | Stop reason: SDUPTHER

## 2019-07-16 ENCOUNTER — OFFICE VISIT (OUTPATIENT)
Dept: FAMILY MEDICINE CLINIC | Facility: CLINIC | Age: 84
End: 2019-07-16
Payer: MEDICARE

## 2019-07-16 VITALS
HEIGHT: 62 IN | BODY MASS INDEX: 38.46 KG/M2 | SYSTOLIC BLOOD PRESSURE: 100 MMHG | OXYGEN SATURATION: 98 % | DIASTOLIC BLOOD PRESSURE: 76 MMHG | HEART RATE: 66 BPM | WEIGHT: 209 LBS

## 2019-07-16 DIAGNOSIS — E11.42 TYPE 2 DIABETES MELLITUS WITH DIABETIC POLYNEUROPATHY, WITHOUT LONG-TERM CURRENT USE OF INSULIN (HCC): ICD-10-CM

## 2019-07-16 DIAGNOSIS — I50.42 CHRONIC COMBINED SYSTOLIC AND DIASTOLIC CONGESTIVE HEART FAILURE (HCC): ICD-10-CM

## 2019-07-16 DIAGNOSIS — Z79.01 CHRONIC ANTICOAGULATION: ICD-10-CM

## 2019-07-16 DIAGNOSIS — Z00.00 MEDICARE ANNUAL WELLNESS VISIT, SUBSEQUENT: Primary | ICD-10-CM

## 2019-07-16 DIAGNOSIS — I25.10 CORONARY ARTERIOSCLEROSIS: ICD-10-CM

## 2019-07-16 DIAGNOSIS — I10 ESSENTIAL HYPERTENSION: ICD-10-CM

## 2019-07-16 DIAGNOSIS — I48.20 CHRONIC ATRIAL FIBRILLATION (HCC): ICD-10-CM

## 2019-07-16 PROCEDURE — G0439 PPPS, SUBSEQ VISIT: HCPCS | Performed by: FAMILY MEDICINE

## 2019-07-16 PROCEDURE — 99213 OFFICE O/P EST LOW 20 MIN: CPT | Performed by: FAMILY MEDICINE

## 2019-07-16 NOTE — PROGRESS NOTES
Arash PLEITEZ  [address] [facility]  [facility] [place] [address]  [address]   [address] [place] [address], [address] MEDICARE SUBSEQUENT VISIT NOTE ( part 1 )      NAME: Boone Avila  AGE: 80 y o  SEX: female  : 1933   MRN: [id number]    DATE: 2019  TIME: 3:31 PM    Assessment and Plan     Problem List Items Addressed This Visit        Endocrine    Type 2 diabetes mellitus with diabetic polyneuropathy, without long-term current use of insulin (HCC)       Cardiovascular and Mediastinum    Atrial fibrillation (HCC)    Chronic combined systolic and diastolic congestive heart failure ([address] )    Coronary arteriosclerosis    Essential hypertension       Other    Chronic anticoagulation      Other Visit Diagnoses     Medicare annual wellness visit, subsequent    -  Primary          Medicare Wellness Counseling/ Discussion    Patient Instructions     Reviewed health history along with medication  Re:  Diabetes - her A1c back in March was 6 6, continue to monitor at home, her home glucometer readings have dropped to 70 at times, I would like her to stop her low-dose Glimepiride  Does see Podiatry/Eye doctor  Redo A1c at follow-up  She does continue to see Cardiology every 6 months, had seen them in late May  Rate controlled with metoprolol 100 twice daily, on Eliquis without bleeding, fluid status has been stable with furosemide 20 mg 2 pills in the morning, 1 in the p m  Also continues on lisinopril 20 mg daily/Zetia 10 mg daily/atorvastatin 80 mg daily  Echocardiogram back in January showed 40% ejection fraction with moderate AS, severely dilated LA  Leah Puffer in February showed no ischemia  Does have slip to redo ultrasound aorta later this year from Cardiology, also has slip to do blood work in September  We did review previous blood work, back in February CBC/CMP unremarkable, cholesterol 140 with HDL 52, LDL 57       Immunization History   Administered Date(s) Administered    INFLUENZA 10/15/2015, 11/01/2017    Influenza Split High Dose Preservative Free IM 09/23/2014, 10/24/2015, 09/27/2016    Influenza TIV (IM) 10/01/2011, 10/01/2012    Influenza, high dose seasonal 0 5 mL 11/13/2018    Pneumococcal 10/18/2015    Pneumococcal Conjugate 13-Valent 03/12/2015    Pneumococcal Polysaccharide PPV23 07/22/2003    Zoster 03/01/2011     Discussed Vaccines,    Prevnar  is up to date   Pneumovax  is up to date  She does do yearly Flu shot  Tdap/tetanus shot will be done at a future date  (done every 10 yrs for superficial cuts, every 5 yrs for deep wounds)   Can also look into coverage for new shingles shot, Shingrix  Can do that at pharmacy  Was never a smoker    No indication for routine screening studies  We discussed end of life planning, she does have a  "LIVING WILL"       Glaucoma screening is up-to-date    Discussed importance of low-salt, low-carbohydrate, healthy diet  We will see her back in 4 months, sooner as needed  Chief Complaint     Chief Complaint   Patient presents with    Medicare Wellness Visit    Follow-up     Diabetes    Rash       History of Present Illness     Mitchell Barlow is a 80y o -year-old female who is in today for a follow-up, 4 month visit along with AWV  Since I saw her back in March she has been doing well  She does continue to see Cardiology every 6 months, had seen them in late May  Rate controlled, on Eliquis, fluid status has been stable with furosemide 20 mg 2 pills in the morning, 1 in the p m  Home sugar occasionally runs around 70 ( asymptomatic ) most often less than 100  Review of Systems     Review of Systems   Constitutional: Negative for appetite change, fatigue, fever and unexpected weight change  HENT: Negative for sore throat and trouble swallowing  Respiratory: Positive for shortness of breath (Back at baseline)  Negative for cough and chest tightness  Cardiovascular: Negative for chest pain, palpitations and leg swelling (Mild chronic, stable)  Gastrointestinal: Negative for abdominal pain, blood in stool, nausea and vomiting  No acid reflux     No change in bowel   Genitourinary: Negative for dysuria and hematuria  Musculoskeletal:        No increased joint pains   Neurological: Negative for dizziness, light-headedness and headaches  Hematological: Bruises/bleeds easily (On anticoagulation, no bleeding)  Psychiatric/Behavioral: Negative for behavioral problems and confusion         Active Problem List     Patient Active Problem List   Diagnosis    Atrial fibrillation ([facility] )    Bilateral impacted cerumen    Bladder incontinence    Carotid artery stenosis    Type 2 diabetes mellitus with diabetic polyneuropathy, without long-term current use of insulin ([facility]    Coronary arteriosclerosis    Esophageal reflux    Hyperlipidemia    Essential hypertension    Impacted cerumen of left ear    Lipoma    OA (osteoarthritis)    Overactive bladder    Right lumbar radiculopathy    Seborrheic dermatitis    Tricuspid valve disorders, non-rheumatic    Chronic combined systolic and diastolic congestive heart failure ([facility])    Obesity    Sinus bradycardia    Spinal stenosis of lumbar region    H/O scarlet fever    Mitral annular calcification    Chronic anticoagulation       Past Medical History:  Past Medical History:   Diagnosis Date    Atrial fibrillation with rapid ventricular response ([facility] )     RESOLVED: [id number]    Lumbar compression fracture ([facility])     RESOLVED: [id number]    Meningocele ([facility] )     ACQUIRED SPINAL CORD; SURGERY 1934    Nasal fracture     RESOLVED: [id number]       Past Surgical History:  Past Surgical History:   Procedure Laterality Date    CATARACT EXTRACTION      COLONOSCOPY      ONSET: 1998    HYSTERECTOMY      REPLACEMENT TOTAL KNEE BILATERAL      ONSET: NOV 2011    SCALP EXCISION  1934    LESION;     TONSILLECTOMY      TRANSLUMINAL ANGIOPLASTY      INTRAOPERATIVE        Family History:  Family History   Problem Relation Age of Onset    Eclampsia Mother     Lung cancer Father     Diabetes Son     Breast cancer Family        Social History:  Social History     Tobacco Use    Smoking status: Never Smoker    Smokeless tobacco: Never Used   Substance Use Topics    Alcohol use: No       Objective     Vitals:    07/16/19 1114   BP: 100/76   BP Location: Left arm   Patient Position: Sitting   Cuff Size: Large   Pulse: 66   SpO2: 98%   Weight: 94 8 kg (209 lb)   Height: 5' 1 9" (1 572 m)     Body mass index is 38 35 kg/m²  BP Readings from Last 3 Encounters:   07/16/19 100/76   03/19/19 124/78   11/13/18 128/78       Wt Readings from Last 3 Encounters:   07/16/19 94 8 kg (209 lb)   03/19/19 96 1 kg (211 lb 12 8 oz)   11/13/18 96 3 kg (212 lb 6 4 oz)       Physical Exam   Constitutional: She is oriented to person, place, and time  No distress  Pleasant obese female   HENT:   Right Ear: External ear normal    Left Ear: External ear normal    Mouth/Throat: Oropharynx is clear and moist  No oropharyngeal exudate  TM clear   Eyes: Conjunctivae are normal  No scleral icterus  Cardiovascular:   Irregularly irregular at controlled rate   Pulmonary/Chest: Effort normal and breath sounds normal  No respiratory distress  Abdominal: Soft  There is no tenderness  Musculoskeletal: She exhibits edema (Slight pitting bilateral lower extremity to upper ankle)  Lymphadenopathy:     She has no cervical adenopathy  Neurological: She is alert and oriented to person, place, and time  Psychiatric: She has a normal mood and affect   Her behavior is normal        ALLERGIES:  Allergies   Allergen Reactions    Celecoxib      Reaction Date: [id number];     Latex     Adhesive [Medical Tape] Rash    Iodinated Diagnostic Agents Rash    Sulfa Antibiotics Rash       Current Medications     Current Outpatient Medications Medication Sig Dispense Refill    apixaban (ELIQUIS) 5 mg Take 5 mg by mouth 2 (two) times a day      atorvastatin (LIPITOR) 80 mg tablet Take 1 tablet by mouth      Cholecalciferol (VITAMIN D) 2000 units CAPS Take by mouth daily       cycloSPORINE (RESTASIS MULTIDOSE) 0 05 % ophthalmic emulsion Apply 1 drop to eye Medrol Dose Pack scheduling ONLY        ezetimibe (ZETIA) 10 mg tablet Take 10 mg by mouth daily        furosemide (LASIX) 20 mg tablet Use 2 pills in the morning, 1 in the afternoon or as directed 270 tablet 3    lisinopril (ZESTRIL) 20 mg tablet TAKE 1 TABLET DAILY 90 tablet 3    metoprolol tartrate (LOPRESSOR) 100 mg tablet TAKE 1 TABLET TWICE A DAY  (CARDIOLOGY HAS PATIENT ON 100MG TWICE DAILY) 90 tablet 3    Multiple Vitamins-Minerals (HAIR SKIN AND NAILS FORMULA) TABS Take by mouth      penicillin V potassium (VEETID) 500 mg tablet as needed For dental procedures   albuterol (VENTOLIN HFA) 90 mcg/act inhaler Inhale 1-2 puffs every 4 (four) hours as needed       glucose blood (TRUETRACK TEST) test strip Use as instructed 100 each 3    nitroglycerin (NITROSTAT) 0 4 mg SL tablet Place under the tongue       No current facility-administered medications for this visit  Health Maintenance     See other note today re clinical AWV info             Most recent labs available from [address] [place] [address]   ( others may be available in Care Everywhere / Media sections)  Lab Results   Component Value Date    WBC 8 05 02/26/2019    HGB 14 0 02/26/2019    HCT 42 9 02/26/2019     02/26/2019    CHOL 186 12/01/2015    TRIG 157 (H) 02/26/2019    HDL 52 02/26/2019    ALT 26 02/26/2019    AST 34 02/26/2019     12/01/2015    K 4 8 02/26/2019     02/26/2019    CREATININE 0 99 02/26/2019    BUN 22 02/26/2019    CO2 30 02/26/2019    GLUF 106 (H) 02/26/2019    HGBA1C 6 6 (A) 03/19/2019       No orders of the defined types were placed in this encounter              Johny Lewis, DO

## 2019-07-16 NOTE — PROGRESS NOTES
Assessment and Plan:     Problem List Items Addressed This Visit        Endocrine    Type 2 diabetes mellitus with diabetic polyneuropathy, without long-term current use of insulin (HCC)       Cardiovascular and Mediastinum    Atrial fibrillation (HCC)    Chronic combined systolic and diastolic congestive heart failure ([address] )    Coronary arteriosclerosis    Essential hypertension       Other    Chronic anticoagulation      Other Visit Diagnoses     Medicare annual wellness visit, subsequent    -  Primary        See other note today regarding provider information     History of Present Illness:     Patient presents for Medicare Annual Wellness visit    Patient Care Team:  Sancho Cruz DO as PCP - Walt Lopez MD (Podiatry)     Problem List:     Patient Active Problem List   Diagnosis    Atrial fibrillation [facility])    Bilateral impacted cerumen    Bladder incontinence    Carotid artery stenosis    Type 2 diabetes mellitus with diabetic polyneuropathy, without long-term current use of insulin ([address] )    Coronary arteriosclerosis    Esophageal reflux    Hyperlipidemia    Essential hypertension    Impacted cerumen of left ear    Lipoma    OA (osteoarthritis)    Overactive bladder    Right lumbar radiculopathy    Seborrheic dermatitis    Tricuspid valve disorders, non-rheumatic    Chronic combined systolic and diastolic congestive heart failure (HCC)    Obesity    Sinus bradycardia    Spinal stenosis of lumbar region    H/O scarlet fever    Mitral annular calcification    Chronic anticoagulation      Past Medical and Surgical History:     Past Medical History:   Diagnosis Date    Atrial fibrillation with rapid ventricular response ([address] )     RESOLVED: [id number]    Lumbar compression fracture ([address] )     RESOLVED: [id number]    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; SURGERY 1934    Nasal fracture     RESOLVED: [id number]     Past Surgical History:   Procedure Laterality Date    CATARACT EXTRACTION      COLONOSCOPY      ONSET: 1998    HYSTERECTOMY      REPLACEMENT TOTAL KNEE BILATERAL      ONSET: NOV 2011    SCALP EXCISION  1934    LESION;     TONSILLECTOMY      TRANSLUMINAL ANGIOPLASTY      INTRAOPERATIVE       Family History:     Family History   Problem Relation Age of Onset    Eclampsia Mother     Lung cancer Father     Diabetes Son     Breast cancer Family       Social History:     Social History     Tobacco Use   Smoking Status Never Smoker   Smokeless Tobacco Never Used     Social History     Substance and Sexual Activity   Alcohol Use No     Social History     Substance and Sexual Activity   Drug Use No      Medications and Allergies:     Current Outpatient Medications   Medication Sig Dispense Refill    apixaban (ELIQUIS) 5 mg Take 5 mg by mouth 2 (two) times a day      atorvastatin (LIPITOR) 80 mg tablet Take 1 tablet by mouth      Cholecalciferol (VITAMIN D) 2000 units CAPS Take by mouth daily       cycloSPORINE (RESTASIS MULTIDOSE) 0 05 % ophthalmic emulsion Apply 1 drop to eye Medrol Dose Pack scheduling ONLY        ezetimibe (ZETIA) 10 mg tablet Take 10 mg by mouth daily        furosemide (LASIX) 20 mg tablet Use 2 pills in the morning, 1 in the afternoon or as directed 270 tablet 3    lisinopril (ZESTRIL) 20 mg tablet TAKE 1 TABLET DAILY 90 tablet 3    metoprolol tartrate (LOPRESSOR) 100 mg tablet TAKE 1 TABLET TWICE A DAY  (CARDIOLOGY HAS PATIENT ON 100MG TWICE DAILY) 90 tablet 3    Multiple Vitamins-Minerals (HAIR SKIN AND NAILS FORMULA) TABS Take by mouth      penicillin V potassium (VEETID) 500 mg tablet as needed For dental procedures   albuterol (VENTOLIN HFA) 90 mcg/act inhaler Inhale 1-2 puffs every 4 (four) hours as needed       glucose blood (TRUETRACK TEST) test strip Use as instructed 100 each 3    nitroglycerin (NITROSTAT) 0 4 mg SL tablet Place under the tongue       No current facility-administered medications for this visit        Allergies Allergen Reactions    Celecoxib      Reaction Date: [id number];     Latex     Adhesive [Medical Tape] Rash    Iodinated Diagnostic Agents Rash    Sulfa Antibiotics Rash      Immunizations:     Immunization History   Administered Date(s) Administered    INFLUENZA 10/15/2015, 11/01/2017    Influenza Split High Dose Preservative Free IM 09/23/2014, 10/24/2015, 09/27/2016    Influenza TIV (IM) 10/01/2011, 10/01/2012    Influenza, high dose seasonal 0 5 mL 11/13/2018    Pneumococcal 10/18/2015    Pneumococcal Conjugate 13-Valent 03/12/2015    Pneumococcal Polysaccharide PPV23 07/22/2003    Zoster 03/01/2011      Medicare Screening Tests and Risk Assessments:     Ernestine Dhaliwal is here for her Subsequent Wellness visit  Health Risk Assessment:  Patient rates overall health as good  Patient feels that their physical health rating is Same  Eyesight was rated as Slightly worse  Hearing was rated as Slightly worse  Patient feels that their emotional and mental health rating is Much better  Pain experienced by patient in the last 7 days has been Some  Patient's pain rating has been 4/10  Emotional/Mental Health:  Patient has not been feeling nervous/anxious  PHQ-9 Depression Screening:    Frequency of the following problems over the past two weeks:      1  Little interest or pleasure in doing things: 0 - not at all      2  Feeling down, depressed, or hopeless: 0 - not at all  PHQ-2 Score: 0          Broken Bones/Falls: Fall Risk Assessment:    In the past year, patient has experienced: History of falling in past year    Number of falls: 1          Bladder/Bowel:  Patient has leaked urine accidently in the last six months  Patient reports no loss of bowel control  Immunizations:  Patient has had a flu vaccination within the last year  Patient has received a pneumonia shot  Patient has received a shingles shot  Patient has not received tetanus/diphtheria shot       Home Safety:  Patient does not have trouble with stairs inside or outside of their home  Patient currently reports that there are no safety hazards present in home, working smoke alarms, no working carbon monoxide detectors  Preventative Screenings:   Breast cancer screening performed, no colon cancer screen completed, cholesterol screen completed, glaucoma eye exam completed,     Nutrition:  Current diet: Diabetic, Low Cholesterol, Low Saturated Fat and Low Carb with servings of the following:    Medications:  Patient is currently taking over-the-counter supplements  Patient is able to manage medications  Lifestyle Choices:  Patient reports no tobacco use  Patient has not smoked or used tobacco in the past   Patient reports no alcohol use  Patient does not drive a vehicle  Patient wears seat belt  Current level of exercise of physical activity described by patient as: walking  Activities of Daily Living:  Can get out of bed by his or her self, able to dress self, able to make own meals, unable to do own shopping, able to bathe self, can do own laundry/housekeeping, can manage own money, pay bills and track expenses    Previous Hospitalizations:  No hospitalization or ED visit in past 12 months        Advanced Directives:  Patient has decided on a power of   Patient has spoken to designated power of   Patient has completed advanced directive

## 2019-07-16 NOTE — PATIENT INSTRUCTIONS
Reviewed health history along with medication  Re:  Diabetes - her A1c back in March was 6 6, continue to monitor at home, her home glucometer readings have dropped to 70 at times, I would like her to stop her low-dose Glimepiride  Does see Podiatry/Eye doctor  Redo A1c at follow-up  She does continue to see Cardiology every 6 months, had seen them in late May  Rate controlled with metoprolol 100 twice daily, on Eliquis without bleeding, fluid status has been stable with furosemide 20 mg 2 pills in the morning, 1 in the p m  Also continues on lisinopril 20 mg daily/Zetia 10 mg daily/atorvastatin 80 mg daily  Echocardiogram back in January showed 40% ejection fraction with moderate AS, severely dilated LA  Annette Kayla in February showed no ischemia  Does have slip to redo ultrasound aorta later this year from Cardiology, also has slip to do blood work in September  We did review previous blood work, back in February CBC/CMP unremarkable, cholesterol 140 with HDL 52, LDL 57  Immunization History   Administered Date(s) Administered    INFLUENZA 10/15/2015, 11/01/2017    Influenza Split High Dose Preservative Free IM 09/23/2014, 10/24/2015, 09/27/2016    Influenza TIV (IM) 10/01/2011, 10/01/2012    Influenza, high dose seasonal 0 5 mL 11/13/2018    Pneumococcal 10/18/2015    Pneumococcal Conjugate 13-Valent 03/12/2015    Pneumococcal Polysaccharide PPV23 07/22/2003    Zoster 03/01/2011     Discussed Vaccines,    Prevnar  is up to date   Pneumovax  is up to date  She does do yearly Flu shot  Tdap/tetanus shot will be done at a future date  (done every 10 yrs for superficial cuts, every 5 yrs for deep wounds)   Can also look into coverage for new shingles shot, Shingrix  Can do that at pharmacy  Was never a smoker    No indication for routine screening studies       We discussed end of life planning, she does have a  "LIVING WILL"       Glaucoma screening is up-to-date    Discussed importance of low-salt, low-carbohydrate, healthy diet  We will see her back in 4 months, sooner as needed

## 2019-11-07 ENCOUNTER — OFFICE VISIT (OUTPATIENT)
Dept: FAMILY MEDICINE CLINIC | Facility: CLINIC | Age: 84
End: 2019-11-07
Payer: MEDICARE

## 2019-11-07 VITALS
HEART RATE: 108 BPM | TEMPERATURE: 97.4 F | DIASTOLIC BLOOD PRESSURE: 80 MMHG | HEIGHT: 62 IN | WEIGHT: 214.3 LBS | RESPIRATION RATE: 18 BRPM | BODY MASS INDEX: 39.43 KG/M2 | SYSTOLIC BLOOD PRESSURE: 124 MMHG | OXYGEN SATURATION: 96 %

## 2019-11-07 DIAGNOSIS — M25.551 PAIN OF RIGHT HIP JOINT: Primary | ICD-10-CM

## 2019-11-07 DIAGNOSIS — Z23 NEED FOR INFLUENZA VACCINATION: ICD-10-CM

## 2019-11-07 PROCEDURE — 99213 OFFICE O/P EST LOW 20 MIN: CPT | Performed by: INTERNAL MEDICINE

## 2019-11-07 PROCEDURE — G0008 ADMIN INFLUENZA VIRUS VAC: HCPCS

## 2019-11-07 PROCEDURE — 90662 IIV NO PRSV INCREASED AG IM: CPT

## 2019-11-07 NOTE — PROGRESS NOTES
Assessment/Plan:     Diagnoses and all orders for this visit:    Pain of right hip joint    Need for influenza vaccination  -     influenza vaccine, [id number], high-dose, PF 0 5 mL (FLUZONE HIGH-DOSE)     Ted was seen and examined in the office today  Some symptoms may be radiating from the back  She is going to monitor this and treat symptomatically  Reminded to be careful with the Aleve as she is on a blood thinner  Subjective:      Patient ID: Kyara Koroma is a 80 y o  female  eTd is here today with complaints of hip pain  Reports she had gone for an ultrasound on Monday and felt fine prior to this  She reports she then was getting off the table and had trouble with this  She felt pain and felt she needed Aleve  She reports having recurrent back pain time to time and feels this may be a similar flare  She reports feeling now having some groin pain in the right and felt knee pain  She reports taking Aleve and seems to help  The following portions of the patient's history were reviewed and updated as appropriate: allergies, current medications, past family history, past medical history, past social history, past surgical history and problem list     Review of Systems   Constitutional: Negative for chills, fever and unexpected weight change  Respiratory: Negative for cough, chest tightness and shortness of breath  Cardiovascular: Negative for chest pain  Gastrointestinal: Negative for abdominal pain  Musculoskeletal: Positive for arthralgias  Neurological: Positive for numbness  Psychiatric/Behavioral: Negative for sleep disturbance  Objective:      /80   Pulse (!) 108   Temp (!) 97 4 °F (36 3 °C)   Resp 18   Ht 5' 1 9" (1 572 m)   Wt 97 2 kg (214 lb 4 8 oz)   SpO2 96%   BMI 39 32 kg/m²          Physical Exam   Constitutional: She is oriented to person, place, and time  She appears well-developed and well-nourished  No distress     HENT:   Head: Normocephalic and atraumatic  Eyes: Conjunctivae and EOM are normal  Left eye exhibits no discharge  Cardiovascular: Normal rate, regular rhythm and normal heart sounds  No murmur heard  Pulmonary/Chest: Effort normal and breath sounds normal  No respiratory distress  She has no wheezes  Musculoskeletal:   Decreased hip flexion on the right  Able to ambulate with walker     Neurological: She is alert and oriented to person, place, and time  Skin: Skin is warm and dry  She is not diaphoretic  No erythema  Psychiatric: She has a normal mood and affect  Her speech is normal and behavior is normal  Judgment and thought content normal    Vitals reviewed

## 2019-11-12 ENCOUNTER — OFFICE VISIT (OUTPATIENT)
Dept: FAMILY MEDICINE CLINIC | Facility: CLINIC | Age: 84
End: 2019-11-12
Payer: MEDICARE

## 2019-11-12 VITALS
WEIGHT: 214.4 LBS | RESPIRATION RATE: 24 BRPM | OXYGEN SATURATION: 97 % | BODY MASS INDEX: 39.45 KG/M2 | HEIGHT: 62 IN | DIASTOLIC BLOOD PRESSURE: 82 MMHG | TEMPERATURE: 97.2 F | SYSTOLIC BLOOD PRESSURE: 130 MMHG | HEART RATE: 100 BPM

## 2019-11-12 DIAGNOSIS — M54.16 RIGHT LUMBAR RADICULOPATHY: Primary | ICD-10-CM

## 2019-11-12 PROCEDURE — 99213 OFFICE O/P EST LOW 20 MIN: CPT | Performed by: INTERNAL MEDICINE

## 2019-11-12 RX ORDER — GABAPENTIN 100 MG/1
100 CAPSULE ORAL 2 TIMES DAILY
Qty: 60 CAPSULE | Refills: 0 | Status: SHIPPED | OUTPATIENT
Start: 2019-11-12 | End: 2019-11-19 | Stop reason: ALTCHOICE

## 2019-11-12 NOTE — PROGRESS NOTES
Assessment/Plan:     Diagnoses and all orders for this visit:    Right lumbar radiculopathy  -     gabapentin (NEURONTIN) 100 mg capsule; Take 1 capsule (100 mg total) by mouth 2 (two) times a day     Lashonda Anthony was seen and examined in the office today  With her continues discomfort without recent trauma/fall, I am going to have her try the gabapentin and try and limit the Advil given that she is on a blood thinner  She will try the gabapentin during the night and monitor for SE  She does report close follow up already scheduled and will keep this appointment  Otherwise no other changes were made  Subjective:      Patient ID: Council Cheadle is a 80 y o  female  Lashondalon Anthony is here today with continued with right hip/leg pain  She reports feeling it in the thigh and seems to go down the leg  She also reports having the back pain as well  She denies having numbness/tingilng  She reports no issues with bowel/bladder incontinence  She is currently taking Advil about twice daily with some relief  Denies recent falls  The following portions of the patient's history were reviewed and updated as appropriate: allergies, current medications, past family history, past medical history, past social history, past surgical history and problem list     Review of Systems   Constitutional: Negative for chills, fever and unexpected weight change  Respiratory: Negative for cough and chest tightness  Gastrointestinal: Negative for abdominal pain, diarrhea, nausea and vomiting  Musculoskeletal: Positive for arthralgias and back pain  Negative for myalgias  Neurological: Negative for dizziness, numbness and headaches  Psychiatric/Behavioral: Positive for sleep disturbance           Objective:      /82   Pulse 100   Temp (!) 97 2 °F (36 2 °C)   Resp (!) 24   Ht 5' 1 9" (1 572 m)   Wt 97 3 kg (214 lb 6 4 oz)   SpO2 97%   BMI 39 34 kg/m²          Physical Exam   Constitutional: She is oriented to person, place, and time  She appears well-developed and well-nourished  No distress  HENT:   Head: Normocephalic and atraumatic  Eyes: Conjunctivae and EOM are normal  Right eye exhibits no discharge  Left eye exhibits no discharge  No scleral icterus  Neck: Normal range of motion  Cardiovascular: Normal rate and normal heart sounds  An irregular rhythm present  Pulmonary/Chest: Effort normal and breath sounds normal  No respiratory distress  She has no wheezes  Musculoskeletal:   Some difficulty with hip flexion on the right  Able to ambulate with the walker     Neurological: She is alert and oriented to person, place, and time  Skin: Skin is warm and dry  She is not diaphoretic  Psychiatric: She has a normal mood and affect  Her speech is normal and behavior is normal  Judgment and thought content normal    Vitals reviewed

## 2019-11-19 ENCOUNTER — OFFICE VISIT (OUTPATIENT)
Dept: FAMILY MEDICINE CLINIC | Facility: CLINIC | Age: 84
End: 2019-11-19
Payer: MEDICARE

## 2019-11-19 VITALS
DIASTOLIC BLOOD PRESSURE: 60 MMHG | HEIGHT: 62 IN | SYSTOLIC BLOOD PRESSURE: 106 MMHG | OXYGEN SATURATION: 96 % | BODY MASS INDEX: 39.56 KG/M2 | RESPIRATION RATE: 18 BRPM | WEIGHT: 215 LBS | HEART RATE: 88 BPM

## 2019-11-19 DIAGNOSIS — I48.91 ATRIAL FIBRILLATION, UNSPECIFIED TYPE (HCC): ICD-10-CM

## 2019-11-19 DIAGNOSIS — I10 ESSENTIAL HYPERTENSION: ICD-10-CM

## 2019-11-19 DIAGNOSIS — I50.42 CHRONIC COMBINED SYSTOLIC AND DIASTOLIC CONGESTIVE HEART FAILURE (HCC): ICD-10-CM

## 2019-11-19 DIAGNOSIS — E11.42 TYPE 2 DIABETES MELLITUS WITH DIABETIC POLYNEUROPATHY, WITHOUT LONG-TERM CURRENT USE OF INSULIN (HCC): Primary | ICD-10-CM

## 2019-11-19 DIAGNOSIS — E66.9 OBESITY, CLASS II, BMI 35-39.9: ICD-10-CM

## 2019-11-19 DIAGNOSIS — M25.561 RIGHT KNEE PAIN, UNSPECIFIED CHRONICITY: ICD-10-CM

## 2019-11-19 DIAGNOSIS — M54.16 RIGHT LUMBAR RADICULOPATHY: ICD-10-CM

## 2019-11-19 DIAGNOSIS — I25.10 CORONARY ARTERIOSCLEROSIS: ICD-10-CM

## 2019-11-19 DIAGNOSIS — Z79.01 CHRONIC ANTICOAGULATION: ICD-10-CM

## 2019-11-19 LAB — SL AMB POCT HEMOGLOBIN AIC: 7.5 (ref ?–6.5)

## 2019-11-19 PROCEDURE — 83036 HEMOGLOBIN GLYCOSYLATED A1C: CPT | Performed by: FAMILY MEDICINE

## 2019-11-19 PROCEDURE — 99214 OFFICE O/P EST MOD 30 MIN: CPT | Performed by: FAMILY MEDICINE

## 2019-11-19 NOTE — PATIENT INSTRUCTIONS
She is doing well here today overall, her pain right low back/right hip/right leg along with knee has improved, previous aggravation coming off table at imaging/radiology  As she is improving we held off on x-rays  If she does note ongoing right knee pain she should follow-up with her orthopedist   Using walker as needed  At her visit in July we stopped glimepiride, A1c here today is 7 5, previously 6 6  This is acceptable, her home readings at times do run down to 80  With risk hypoglycemia we will hold off on adding other medication, she will continue to work at [employer], we will re-evaluate with A1c in 4 months  Her blood pressure is on the lower side here today but she is not orthostatic  Fluid status appears stable  She will continue metoprolol tartrate 100 twice daily, lisinopril 20 mg daily, furosemide 20 mg 2 in the morning, 1 in the afternoon  She also is anticoagulated with Eliquis regarding AFib  She will continue to see Cardiology  Last echocardiogram in January showed 40% ejection fraction with moderate AS  In February her Lexiscan did not show ischemia  Also back in February cholesterol 140 with HDL 52, LDL 57

## 2019-11-19 NOTE — PROGRESS NOTES
BMI Counseling: Body mass index is 39 45 kg/m²  The BMI is above normal  Nutrition recommendations include decreasing portion sizes, encouraging healthy choices of fruits and vegetables and moderation in carbohydrate intake  FAMILY PRACTICE OFFICE VISIT  [facility]  [id number] [facility] [address]  [place], [place], [address]      NAME: Daksha Juarez  AGE: 80 y o  SEX: female  : 1933   MRN: [id number]    DATE: 2019  TIME: 12:26 PM    Assessment and Plan     Problem List Items Addressed This Visit        Endocrine    Type 2 diabetes mellitus with diabetic polyneuropathy, without long-term current use of insulin ([facility] ) - Primary    Relevant Orders    POCT hemoglobin A1c (Completed)       Cardiovascular and Mediastinum    Atrial fibrillation (HCC)    Chronic combined systolic and diastolic congestive heart failure (HCC)    Coronary arteriosclerosis    Essential hypertension       Nervous and Auditory    Right lumbar radiculopathy       Other    Chronic anticoagulation      Other Visit Diagnoses     Right knee pain        Obesity, Class II, BMI 35-39 9              Patient Instructions   She is doing well here today overall, her pain right low back/right hip/right leg along with knee has improved, previous aggravation coming off table at imaging/radiology  As she is improving we held off on x-rays  If she does note ongoing right knee pain she should follow-up with her orthopedist   Using walker as needed  At her visit in July we stopped glimepiride, A1c here today is 7 5, previously 6 6  This is acceptable, her home readings at times do run down to 80  With risk hypoglycemia we will hold off on adding other medication, she will continue to work at [employer], we will re-evaluate with A1c in 4 months  Her blood pressure is on the lower side here today but she is not orthostatic  Fluid status appears stable    She will continue metoprolol tartrate 100 twice daily, lisinopril 20 mg daily, furosemide 20 mg 2 in the morning, 1 in the afternoon  She also is anticoagulated with Eliquis regarding AFib  She will continue to see Cardiology  Last echocardiogram in January showed 40% ejection fraction with moderate AS  In February her Lexiscan did not show ischemia  Also back in February cholesterol 140 with HDL 52, LDL 57        Chief Complaint     Chief Complaint   Patient presents with    Follow-up     4month       History of Present Illness   Monroe Graham is a 80y o -year-old female who is in today for a 4 month check, she has been doing well regarding history heart failure, no increased shortness of breath, does occasionally note palpitations but nothing worse than baseline  No increased ankle swelling  She continues to see Cardiology routinely, is using medication as directed  She was here twice recently with Dr Claudean Bears, she had pain in her right low back, hip, knee related to coming off table at Radiology  She is much improved today  Does continue with pain in right knee though  Using walker  At her last visit we stopped glimepiride, her home sugars have dropped as low as 80 without that medication  Review of Systems   Review of Systems   Constitutional: Negative for appetite change, fatigue, fever and unexpected weight change  HENT: Negative for sore throat and trouble swallowing  Respiratory: Negative for cough and chest tightness  Mild chronic dyspnea on exertion unchanged, no increased orthopnea   Cardiovascular: Positive for palpitations and leg swelling (Chronic stable)  Negative for chest pain  Gastrointestinal: Negative for abdominal pain, blood in stool, nausea and vomiting  No acid reflux     No change in bowel   Genitourinary: Negative for dysuria and hematuria  Neurological: Negative for dizziness, syncope, light-headedness and headaches     Hematological: Bruises/bleeds easily (No bleeding)  Psychiatric/Behavioral: Negative for behavioral problems and confusion  Active Problem List     Patient Active Problem List   Diagnosis    Atrial fibrillation ([address] )    Bilateral impacted cerumen    Bladder incontinence    Carotid artery stenosis    Type 2 diabetes mellitus with diabetic polyneuropathy, without long-term current use of insulin (HCC)    Coronary arteriosclerosis    Esophageal reflux    Hyperlipidemia    Essential hypertension    Impacted cerumen of left ear    Lipoma    OA (osteoarthritis)    Overactive bladder    Right lumbar radiculopathy    Seborrheic dermatitis    Tricuspid valve disorders, non-rheumatic    Chronic combined systolic and diastolic congestive heart failure (HCC)    Obesity    Sinus bradycardia    Spinal stenosis of lumbar region    H/O scarlet fever    Mitral annular calcification    Chronic anticoagulation       Past Medical History:  Reviewed    Past Surgical History:  Reviewed    Family History:  Reviewed    Social History:  Reviewed    Objective     Vitals:    11/19/19 1103   BP: 106/60   Pulse: 88   Resp: 18   SpO2: 96%   Weight: 97 5 kg (215 lb)   Height: 5' 1 9" (1 572 m)     Body mass index is 39 45 kg/m²  BP Readings from Last 3 Encounters:   11/19/19 106/60   11/12/19 130/82   11/07/19 124/80       Wt Readings from Last 3 Encounters:   11/19/19 97 5 kg (215 lb)   11/12/19 97 3 kg (214 lb 6 4 oz)   11/07/19 97 2 kg (214 lb 4 8 oz)       Physical Exam   Constitutional: She is oriented to person, place, and time  Pleasant obese female seated in chair, no acute distress  Has walker with her  HENT:   TM clear   Eyes: Conjunctivae are normal  No scleral icterus  Cardiovascular:   Irregularly irregular at controlled rate   Pulmonary/Chest: Effort normal and breath sounds normal  No respiratory distress  Abdominal: Soft  There is no tenderness  Musculoskeletal: She exhibits no edema     Tender with degenerative change/past surgery right knee  Lymphadenopathy:     She has no cervical adenopathy  Neurological: She is alert and oriented to person, place, and time  Psychiatric: She has a normal mood and affect  Her behavior is normal        ALLERGIES:  Allergies   Allergen Reactions    Celecoxib      Reaction Date: 05Dec2011;     Latex     Adhesive [Medical Tape] Rash    Iodinated Diagnostic Agents Rash    Sulfa Antibiotics Rash       Current Medications     Current Outpatient Medications   Medication Sig Dispense Refill    apixaban (ELIQUIS) 5 mg Take 5 mg by mouth 2 (two) times a day      atorvastatin (LIPITOR) 80 mg tablet Take 1 tablet by mouth      Cholecalciferol (VITAMIN D) 2000 units CAPS Take by mouth daily       cycloSPORINE (RESTASIS MULTIDOSE) 0 05 % ophthalmic emulsion Apply 1 drop to eye Medrol Dose Pack scheduling ONLY        ezetimibe (ZETIA) 10 mg tablet Take 10 mg by mouth daily        furosemide (LASIX) 20 mg tablet Use 2 pills in the morning, 1 in the afternoon or as directed 270 tablet 3    lisinopril (ZESTRIL) 20 mg tablet TAKE 1 TABLET DAILY 90 tablet 3    metoprolol tartrate (LOPRESSOR) 100 mg tablet TAKE 1 TABLET TWICE A DAY  (CARDIOLOGY HAS PATIENT ON 100MG TWICE DAILY) 90 tablet 3    Multiple Vitamins-Minerals (HAIR SKIN AND NAILS FORMULA) TABS Take by mouth      albuterol (VENTOLIN HFA) 90 mcg/act inhaler Inhale 1-2 puffs every 4 (four) hours as needed       glucose blood (TRUETRACK TEST) test strip Use as instructed 100 each 3    nitroglycerin (NITROSTAT) 0 4 mg SL tablet Place under the tongue      penicillin V potassium (VEETID) 500 mg tablet as needed For dental procedures  No current facility-administered medications for this visit               Orders Placed This Encounter   Procedures    POCT hemoglobin A1c         Lexie Green DO

## 2019-12-04 DIAGNOSIS — I48.20 CHRONIC ATRIAL FIBRILLATION (HCC): ICD-10-CM

## 2019-12-04 RX ORDER — METOPROLOL TARTRATE 100 MG/1
100 TABLET ORAL EVERY 12 HOURS SCHEDULED
Qty: 90 TABLET | Refills: 3 | Status: SHIPPED | OUTPATIENT
Start: 2019-12-04 | End: 2020-07-03

## 2020-02-24 ENCOUNTER — TELEPHONE (OUTPATIENT)
Dept: FAMILY MEDICINE CLINIC | Facility: CLINIC | Age: 85
End: 2020-02-24

## 2020-02-24 DIAGNOSIS — E11.9 CONTROLLED TYPE 2 DIABETES MELLITUS WITHOUT COMPLICATION, WITHOUT LONG-TERM CURRENT USE OF INSULIN (HCC): ICD-10-CM

## 2020-02-24 RX ORDER — GLIMEPIRIDE 1 MG/1
TABLET ORAL
Qty: 90 TABLET | Refills: 3
Start: 2020-02-24 | End: 2020-03-09

## 2020-02-24 NOTE — TELEPHONE ENCOUNTER
Call patient, she can restart glimepiride 1 milligram tablet 1/2 pill daily, watch for low readings, if she needs a new prescription sent in let me know

## 2020-02-24 NOTE — TELEPHONE ENCOUNTER
Her blood sugars have been running over 200's  Didn't know if you wanted her to restart the glimiperide?

## 2020-02-26 NOTE — TELEPHONE ENCOUNTER
Pt called again  She never got my message , has trouble with her voicemail  She will restart it  Needs a new script sent

## 2020-03-07 DIAGNOSIS — E11.9 CONTROLLED TYPE 2 DIABETES MELLITUS WITHOUT COMPLICATION, WITHOUT LONG-TERM CURRENT USE OF INSULIN (HCC): ICD-10-CM

## 2020-03-09 RX ORDER — GLIMEPIRIDE 1 MG/1
TABLET ORAL
Qty: 45 TABLET | Refills: 3 | Status: SHIPPED | OUTPATIENT
Start: 2020-03-09 | End: 2021-04-28 | Stop reason: SDUPTHER

## 2020-03-19 ENCOUNTER — OFFICE VISIT (OUTPATIENT)
Dept: FAMILY MEDICINE CLINIC | Facility: CLINIC | Age: 85
End: 2020-03-19
Payer: MEDICARE

## 2020-03-19 VITALS
TEMPERATURE: 97.8 F | HEART RATE: 65 BPM | SYSTOLIC BLOOD PRESSURE: 122 MMHG | BODY MASS INDEX: 36.44 KG/M2 | DIASTOLIC BLOOD PRESSURE: 80 MMHG | HEIGHT: 62 IN | WEIGHT: 198 LBS | OXYGEN SATURATION: 94 %

## 2020-03-19 DIAGNOSIS — I50.42 CHRONIC COMBINED SYSTOLIC AND DIASTOLIC CONGESTIVE HEART FAILURE (HCC): ICD-10-CM

## 2020-03-19 DIAGNOSIS — E11.65 TYPE 2 DIABETES MELLITUS WITH HYPERGLYCEMIA, WITHOUT LONG-TERM CURRENT USE OF INSULIN (HCC): Primary | ICD-10-CM

## 2020-03-19 DIAGNOSIS — Z79.01 CHRONIC ANTICOAGULATION: ICD-10-CM

## 2020-03-19 DIAGNOSIS — I25.10 CORONARY ARTERIOSCLEROSIS: ICD-10-CM

## 2020-03-19 DIAGNOSIS — I48.91 ATRIAL FIBRILLATION, UNSPECIFIED TYPE (HCC): ICD-10-CM

## 2020-03-19 DIAGNOSIS — I10 ESSENTIAL HYPERTENSION: ICD-10-CM

## 2020-03-19 PROBLEM — H61.22 IMPACTED CERUMEN OF LEFT EAR: Status: RESOLVED | Noted: 2017-09-11 | Resolved: 2020-03-19

## 2020-03-19 LAB — SL AMB POCT HEMOGLOBIN AIC: 9.2 (ref ?–6.5)

## 2020-03-19 PROCEDURE — 3079F DIAST BP 80-89 MM HG: CPT | Performed by: FAMILY MEDICINE

## 2020-03-19 PROCEDURE — 1160F RVW MEDS BY RX/DR IN RCRD: CPT | Performed by: FAMILY MEDICINE

## 2020-03-19 PROCEDURE — 99214 OFFICE O/P EST MOD 30 MIN: CPT | Performed by: FAMILY MEDICINE

## 2020-03-19 PROCEDURE — 4040F PNEUMOC VAC/ADMIN/RCVD: CPT | Performed by: FAMILY MEDICINE

## 2020-03-19 PROCEDURE — 1036F TOBACCO NON-USER: CPT | Performed by: FAMILY MEDICINE

## 2020-03-19 PROCEDURE — 83036 HEMOGLOBIN GLYCOSYLATED A1C: CPT | Performed by: FAMILY MEDICINE

## 2020-03-19 PROCEDURE — 2022F DILAT RTA XM EVC RTNOPTHY: CPT | Performed by: FAMILY MEDICINE

## 2020-03-19 PROCEDURE — 3008F BODY MASS INDEX DOCD: CPT | Performed by: FAMILY MEDICINE

## 2020-03-19 PROCEDURE — 3074F SYST BP LT 130 MM HG: CPT | Performed by: FAMILY MEDICINE

## 2020-03-19 PROCEDURE — 3046F HEMOGLOBIN A1C LEVEL >9.0%: CPT | Performed by: FAMILY MEDICINE

## 2020-03-19 NOTE — PATIENT INSTRUCTIONS
Blood pressure is excellent here today, continue lisinopril 20 mg daily, metoprolol tartrate 100 mg twice daily  She will continue to see Cardiology as is every 6 months, continues on Eliquis via them  Also continues on atorvastatin 80 mg daily , Zetia 10 mg daily, furosemide  40 mg a m , 20 mg p m  Slip given to do fasting blood work  -CBC/CMP  Cholesterol 1 year ago 140 with HDL 52, LDL 57  Regarding history diabetes, back in July we had stopped glimepiride due to hypoglycemia, A1c at that time 6 6  In November A1c 7 5, redo here today has risen to 9 2  She is now back on low-dose glimepiride 0 5 mg every day, sugars are improving  Watch for low readings  We did discuss medication options, she will continue glimepiride 0 5 mg daily and I would like her to update us regarding home readings in 1 month  We will redo A1c at follow-up visit 6 months  She does continue to see Podiatry, sees eye doctor  She has dropped 10 to 15 lb, has dry mouth with some issues swallowing pills  She will use Biotene mouthwash, if this worsens consider testing/ consultation

## 2020-03-19 NOTE — PROGRESS NOTES
FAMILY PRACTICE OFFICE VISIT  [facility]  [address] [facility] [address]  [facility] [place] [address]      NAME: Hemant Mendez  AGE: 80 y o  SEX: female  : 1933   MRN: [id number]    DATE: 3/19/2020  TIME: 1:29 PM    Assessment and Plan     Problem List Items Addressed This Visit        Endocrine    Type 2 diabetes mellitus with hyperglycemia, without long-term current use of insulin ([facility] [address] ) - Primary    Relevant Orders    POCT hemoglobin A1c (Completed)       Cardiovascular and Mediastinum    Atrial fibrillation (HCC)    Chronic combined systolic and diastolic congestive heart failure (HCC)    Relevant Orders    CBC    Comprehensive metabolic panel    Coronary arteriosclerosis    Essential hypertension       Other    Chronic anticoagulation          Patient Instructions     Blood pressure is excellent here today, continue lisinopril 20 mg daily, metoprolol tartrate 100 mg twice daily  She will continue to see Cardiology as is every 6 months, continues on Eliquis via them  Also continues on atorvastatin 80 mg daily , Zetia 10 mg daily, furosemide  40 mg a m , 20 mg p m  Slip given to do fasting blood work  -CBC/CMP  Cholesterol 1 year ago 140 with HDL 52, LDL 57  Regarding history diabetes, back in July we had stopped glimepiride due to hypoglycemia, A1c at that time 6 6  In November A1c 7 5, redo here today has risen to 9 2  She is now back on low-dose glimepiride 0 5 mg every day, sugars are improving  Watch for low readings  We did discuss medication options, she will continue glimepiride 0 5 mg daily and I would like her to update us regarding home readings in 1 month  We will redo A1c at follow-up visit 6 months  She does continue to see Podiatry, sees eye doctor  She has dropped 10 to 15 lb, has dry mouth with some issues swallowing pills    She will use Biotene mouthwash, if this worsens consider testing/ consultation  Chief Complaint     Chief Complaint   Patient presents with    Follow-up       History of Present Illness   Lorna Esquivel is a 80y o -year-old female who is in today for a 4 month check, she relates she has been feeling okay, back in July we stopped glimepiride due to low readings, home glucometer readings have been Running 140 to 170 range, or occasionally over 200, she had contacted us in late February regarding elevated sugars and we restarted glimepiride 0 5 mg daily  Has noted lower sugars since then, no hypoglycemic readings or symptoms  She continues to see Cardiology, saw them in November, continues on Eliquis along with other medication  Does run low blood pressure readings, no increased orthostatic symptoms     -does have some issues with pills sticking in throat recently  Dry throat      Review of Systems   Review of Systems   Constitutional: Positive for unexpected weight change (Has dropped 10 to 15 lb-Limits portions)  Negative for activity change, appetite change, chills, fatigue and fever  HENT: Positive for trouble swallowing  Negative for mouth sores and sore throat  Eyes: Negative for visual disturbance  Respiratory: Negative for cough, choking, chest tightness and shortness of breath  Cardiovascular: Negative for chest pain, palpitations and leg swelling  Gastrointestinal: Negative for abdominal pain, blood in stool, constipation, diarrhea, nausea and vomiting  No acid reflux     No change in bowel   Genitourinary: Negative for dysuria and hematuria  Neurological: Negative for dizziness, syncope, light-headedness and headaches  Hematological: Does not bruise/bleed easily  Psychiatric/Behavioral: Negative for behavioral problems, confusion (Mild cognitive impairment as before) and sleep disturbance         Active Problem List     Patient Active Problem List   Diagnosis    Atrial fibrillation ([facility] [address] )    Bilateral impacted cerumen    Bladder incontinence    Carotid artery stenosis    Type 2 diabetes mellitus with diabetic polyneuropathy, without long-term current use of insulin (HCC)    Coronary arteriosclerosis    Esophageal reflux    Hyperlipidemia    Essential hypertension    Lipoma    OA (osteoarthritis)    Overactive bladder    Right lumbar radiculopathy    Seborrheic dermatitis    Tricuspid valve disorders, non-rheumatic    Chronic combined systolic and diastolic congestive heart failure (HCC)    Obesity    Sinus bradycardia    Spinal stenosis of lumbar region    H/O scarlet fever    Mitral annular calcification    Chronic anticoagulation    Type 2 diabetes mellitus with hyperglycemia, without long-term current use of insulin (HCC)       Past Medical History:  Reviewed    Past Surgical History:  Reviewed    Family History:  Reviewed    Social History:  Reviewed    Objective     Vitals:    03/19/20 1256   BP: 122/80   BP Location: Left arm   Patient Position: Sitting   Cuff Size: Adult   Pulse: 65   Temp: 97 8 °F (36 6 °C)   TempSrc: Tympanic   SpO2: 94%   Weight: 89 8 kg (198 lb)   Height: 5' 1 5" (1 562 m)     Body mass index is 36 81 kg/m²  BP Readings from Last 3 Encounters:   03/19/20 122/80   11/19/19 106/60   11/12/19 130/82       Wt Readings from Last 3 Encounters:   03/19/20 89 8 kg (198 lb)   11/19/19 97 5 kg (215 lb)   11/12/19 97 3 kg (214 lb 6 4 oz)       Physical Exam   Constitutional: She is oriented to person, place, and time  She appears well-developed and well-nourished  No distress  HENT:   Right Ear: External ear normal    Left Ear: External ear normal    Mouth/Throat: No oropharyngeal exudate  TM clear  Pharynx dry  No lesion  No neck swelling or mass  Eyes: Conjunctivae are normal  No scleral icterus  Cardiovascular:   Irregularly irregular at controlled rate   Pulmonary/Chest: Effort normal and breath sounds normal  No respiratory distress  Abdominal: Soft  There is no tenderness  Musculoskeletal: She exhibits no edema (Right greater than left slightly pitting edema to mid calf)  Lymphadenopathy:     She has no cervical adenopathy  Neurological: She is alert and oriented to person, place, and time  Psychiatric: She has a normal mood and affect  Her behavior is normal        ALLERGIES:  Allergies   Allergen Reactions    Celecoxib      Reaction Date: 05Dec2011;     Latex     Adhesive [Medical Tape] Rash    Iodinated Diagnostic Agents Rash    Sulfa Antibiotics Rash       Current Medications     Current Outpatient Medications   Medication Sig Dispense Refill    apixaban (ELIQUIS) 5 mg Take 5 mg by mouth 2 (two) times a day      atorvastatin (LIPITOR) 80 mg tablet Take 1 tablet by mouth      Cholecalciferol (VITAMIN D) 2000 units CAPS Take by mouth daily       cycloSPORINE (RESTASIS MULTIDOSE) 0 05 % ophthalmic emulsion Apply 1 drop to eye Medrol Dose Pack scheduling ONLY        ezetimibe (ZETIA) 10 mg tablet Take 10 mg by mouth daily        furosemide (LASIX) 20 mg tablet Use 2 pills in the morning, 1 in the afternoon or as directed 270 tablet 3    glimepiride (AMARYL) 1 mg tablet TAKE 1/2 TABLET DAILY      (LOWER DOSE) 45 tablet 3    lisinopril (ZESTRIL) 20 mg tablet TAKE 1 TABLET DAILY 90 tablet 3    metoprolol tartrate (LOPRESSOR) 100 mg tablet Take 1 tablet (100 mg total) by mouth every 12 (twelve) hours 90 tablet 3    Multiple Vitamins-Minerals (HAIR SKIN AND NAILS FORMULA) TABS Take by mouth      albuterol (VENTOLIN HFA) 90 mcg/act inhaler Inhale 1-2 puffs every 4 (four) hours as needed       glucose blood (TRUETRACK TEST) test strip Use as instructed 100 each 3    nitroglycerin (NITROSTAT) 0 4 mg SL tablet Place under the tongue      penicillin V potassium (VEETID) 500 mg tablet as needed For dental procedures  No current facility-administered medications for this visit               Orders Placed This Encounter   Procedures    CBC    Comprehensive metabolic panel    POCT hemoglobin A1c         Bernadette Friends, DO

## 2020-03-30 ENCOUNTER — APPOINTMENT (OUTPATIENT)
Dept: LAB | Facility: CLINIC | Age: 85
End: 2020-03-30
Payer: MEDICARE

## 2020-03-30 ENCOUNTER — TRANSCRIBE ORDERS (OUTPATIENT)
Dept: LAB | Facility: CLINIC | Age: 85
End: 2020-03-30

## 2020-03-30 DIAGNOSIS — I10 ESSENTIAL HYPERTENSION, BENIGN: ICD-10-CM

## 2020-03-30 DIAGNOSIS — I10 ESSENTIAL HYPERTENSION, BENIGN: Primary | ICD-10-CM

## 2020-03-30 LAB
ALBUMIN SERPL BCP-MCNC: 4 G/DL (ref 3.5–5)
ALP SERPL-CCNC: 106 U/L (ref 46–116)
ALT SERPL W P-5'-P-CCNC: 20 U/L (ref 12–78)
ANION GAP SERPL CALCULATED.3IONS-SCNC: 8 MMOL/L (ref 4–13)
AST SERPL W P-5'-P-CCNC: 28 U/L (ref 5–45)
BILIRUB SERPL-MCNC: 0.97 MG/DL (ref 0.2–1)
BUN SERPL-MCNC: 23 MG/DL (ref 5–25)
CALCIUM SERPL-MCNC: 9.8 MG/DL (ref 8.3–10.1)
CHLORIDE SERPL-SCNC: 106 MMOL/L (ref 100–108)
CO2 SERPL-SCNC: 29 MMOL/L (ref 21–32)
CREAT SERPL-MCNC: 1.03 MG/DL (ref 0.6–1.3)
ERYTHROCYTE [DISTWIDTH] IN BLOOD BY AUTOMATED COUNT: 13.3 % (ref 11.6–15.1)
GFR SERPL CREATININE-BSD FRML MDRD: 49 ML/MIN/1.73SQ M
GLUCOSE P FAST SERPL-MCNC: 140 MG/DL (ref 65–99)
HCT VFR BLD AUTO: 42.7 % (ref 34.8–46.1)
HGB BLD-MCNC: 13.9 G/DL (ref 11.5–15.4)
MCH RBC QN AUTO: 33.2 PG (ref 26.8–34.3)
MCHC RBC AUTO-ENTMCNC: 32.6 G/DL (ref 31.4–37.4)
MCV RBC AUTO: 102 FL (ref 82–98)
PLATELET # BLD AUTO: 207 THOUSANDS/UL (ref 149–390)
PMV BLD AUTO: 9.8 FL (ref 8.9–12.7)
POTASSIUM SERPL-SCNC: 3.5 MMOL/L (ref 3.5–5.3)
PROT SERPL-MCNC: 7.6 G/DL (ref 6.4–8.2)
RBC # BLD AUTO: 4.19 MILLION/UL (ref 3.81–5.12)
SODIUM SERPL-SCNC: 143 MMOL/L (ref 136–145)
WBC # BLD AUTO: 6.88 THOUSAND/UL (ref 4.31–10.16)

## 2020-03-30 PROCEDURE — 36415 COLL VENOUS BLD VENIPUNCTURE: CPT | Performed by: FAMILY MEDICINE

## 2020-03-30 PROCEDURE — 80053 COMPREHEN METABOLIC PANEL: CPT | Performed by: FAMILY MEDICINE

## 2020-03-30 PROCEDURE — 85027 COMPLETE CBC AUTOMATED: CPT | Performed by: FAMILY MEDICINE

## 2020-04-09 DIAGNOSIS — I10 ESSENTIAL HYPERTENSION: ICD-10-CM

## 2020-04-09 RX ORDER — LISINOPRIL 20 MG/1
TABLET ORAL
Qty: 90 TABLET | Refills: 3 | Status: SHIPPED | OUTPATIENT
Start: 2020-04-09 | End: 2021-04-09

## 2020-06-10 DIAGNOSIS — I50.42 CHRONIC COMBINED SYSTOLIC AND DIASTOLIC HEART FAILURE (HCC): ICD-10-CM

## 2020-06-11 RX ORDER — FUROSEMIDE 20 MG/1
TABLET ORAL
Qty: 270 TABLET | Refills: 3 | Status: SHIPPED | OUTPATIENT
Start: 2020-06-11 | End: 2021-09-16

## 2020-07-03 DIAGNOSIS — I48.20 CHRONIC ATRIAL FIBRILLATION (HCC): ICD-10-CM

## 2020-07-03 RX ORDER — METOPROLOL TARTRATE 100 MG/1
TABLET ORAL
Qty: 90 TABLET | Refills: 3 | Status: SHIPPED | OUTPATIENT
Start: 2020-07-03 | End: 2020-07-16 | Stop reason: SDUPTHER

## 2020-07-16 DIAGNOSIS — I48.20 CHRONIC ATRIAL FIBRILLATION (HCC): ICD-10-CM

## 2020-07-16 RX ORDER — METOPROLOL TARTRATE 100 MG/1
100 TABLET ORAL EVERY 12 HOURS
Qty: 20 TABLET | Refills: 0 | Status: SHIPPED | OUTPATIENT
Start: 2020-07-16 | End: 2020-09-21 | Stop reason: SDUPTHER

## 2020-07-16 NOTE — TELEPHONE ENCOUNTER
Pt called requesting short supply of metoprolol while she is still waiting to receive her mail order prescription   Order pur in pending approval

## 2020-09-21 ENCOUNTER — APPOINTMENT (OUTPATIENT)
Dept: LAB | Facility: CLINIC | Age: 85
End: 2020-09-21
Payer: MEDICARE

## 2020-09-21 ENCOUNTER — OFFICE VISIT (OUTPATIENT)
Dept: FAMILY MEDICINE CLINIC | Facility: CLINIC | Age: 85
End: 2020-09-21
Payer: MEDICARE

## 2020-09-21 ENCOUNTER — TELEPHONE (OUTPATIENT)
Dept: ADMINISTRATIVE | Facility: OTHER | Age: 85
End: 2020-09-21

## 2020-09-21 VITALS
HEART RATE: 100 BPM | SYSTOLIC BLOOD PRESSURE: 132 MMHG | DIASTOLIC BLOOD PRESSURE: 80 MMHG | TEMPERATURE: 97.3 F | BODY MASS INDEX: 39.27 KG/M2 | OXYGEN SATURATION: 98 % | HEIGHT: 61 IN | WEIGHT: 208 LBS

## 2020-09-21 DIAGNOSIS — E66.01 CLASS 2 SEVERE OBESITY WITH SERIOUS COMORBIDITY AND BODY MASS INDEX (BMI) OF 39.0 TO 39.9 IN ADULT, UNSPECIFIED OBESITY TYPE (HCC): ICD-10-CM

## 2020-09-21 DIAGNOSIS — I25.10 CORONARY ARTERIOSCLEROSIS: ICD-10-CM

## 2020-09-21 DIAGNOSIS — E11.42 TYPE 2 DIABETES MELLITUS WITH DIABETIC POLYNEUROPATHY, WITHOUT LONG-TERM CURRENT USE OF INSULIN (HCC): ICD-10-CM

## 2020-09-21 DIAGNOSIS — I48.21 PERMANENT ATRIAL FIBRILLATION (HCC): ICD-10-CM

## 2020-09-21 DIAGNOSIS — Z00.00 MEDICARE ANNUAL WELLNESS VISIT, SUBSEQUENT: Primary | ICD-10-CM

## 2020-09-21 DIAGNOSIS — Z23 FLU VACCINE NEED: ICD-10-CM

## 2020-09-21 DIAGNOSIS — I10 ESSENTIAL HYPERTENSION: ICD-10-CM

## 2020-09-21 DIAGNOSIS — E78.5 HYPERLIPIDEMIA, UNSPECIFIED HYPERLIPIDEMIA TYPE: ICD-10-CM

## 2020-09-21 PROBLEM — E11.9 TYPE 2 DIABETES MELLITUS WITHOUT COMPLICATION, WITHOUT LONG-TERM CURRENT USE OF INSULIN (HCC): Status: ACTIVE | Noted: 2020-03-19

## 2020-09-21 PROBLEM — E11.9 TYPE 2 DIABETES MELLITUS WITHOUT COMPLICATION, WITHOUT LONG-TERM CURRENT USE OF INSULIN (HCC): Status: RESOLVED | Noted: 2020-03-19 | Resolved: 2020-09-21

## 2020-09-21 LAB
ANION GAP SERPL CALCULATED.3IONS-SCNC: 9 MMOL/L (ref 4–13)
BUN SERPL-MCNC: 22 MG/DL (ref 5–25)
CALCIUM SERPL-MCNC: 9.7 MG/DL (ref 8.3–10.1)
CHLORIDE SERPL-SCNC: 104 MMOL/L (ref 100–108)
CHOLEST SERPL-MCNC: 184 MG/DL (ref 50–200)
CO2 SERPL-SCNC: 26 MMOL/L (ref 21–32)
CREAT SERPL-MCNC: 1.09 MG/DL (ref 0.6–1.3)
ERYTHROCYTE [DISTWIDTH] IN BLOOD BY AUTOMATED COUNT: 13.1 % (ref 11.6–15.1)
EST. AVERAGE GLUCOSE BLD GHB EST-MCNC: 177 MG/DL
GFR SERPL CREATININE-BSD FRML MDRD: 46 ML/MIN/1.73SQ M
GLUCOSE P FAST SERPL-MCNC: 169 MG/DL (ref 65–99)
HBA1C MFR BLD: 7.8 %
HCT VFR BLD AUTO: 42.5 % (ref 34.8–46.1)
HDLC SERPL-MCNC: 63 MG/DL
HGB BLD-MCNC: 13.6 G/DL (ref 11.5–15.4)
LDLC SERPL CALC-MCNC: 98 MG/DL (ref 0–100)
MCH RBC QN AUTO: 31.3 PG (ref 26.8–34.3)
MCHC RBC AUTO-ENTMCNC: 32 G/DL (ref 31.4–37.4)
MCV RBC AUTO: 98 FL (ref 82–98)
NONHDLC SERPL-MCNC: 121 MG/DL
PLATELET # BLD AUTO: 202 THOUSANDS/UL (ref 149–390)
PMV BLD AUTO: 10 FL (ref 8.9–12.7)
POTASSIUM SERPL-SCNC: 3.9 MMOL/L (ref 3.5–5.3)
RBC # BLD AUTO: 4.35 MILLION/UL (ref 3.81–5.12)
SODIUM SERPL-SCNC: 139 MMOL/L (ref 136–145)
TRIGL SERPL-MCNC: 117 MG/DL
TSH SERPL DL<=0.05 MIU/L-ACNC: 1.33 UIU/ML (ref 0.36–3.74)
WBC # BLD AUTO: 5.95 THOUSAND/UL (ref 4.31–10.16)

## 2020-09-21 PROCEDURE — 84443 ASSAY THYROID STIM HORMONE: CPT | Performed by: FAMILY MEDICINE

## 2020-09-21 PROCEDURE — G0008 ADMIN INFLUENZA VIRUS VAC: HCPCS

## 2020-09-21 PROCEDURE — 85027 COMPLETE CBC AUTOMATED: CPT | Performed by: FAMILY MEDICINE

## 2020-09-21 PROCEDURE — 80048 BASIC METABOLIC PNL TOTAL CA: CPT

## 2020-09-21 PROCEDURE — 83036 HEMOGLOBIN GLYCOSYLATED A1C: CPT | Performed by: FAMILY MEDICINE

## 2020-09-21 PROCEDURE — 80061 LIPID PANEL: CPT | Performed by: FAMILY MEDICINE

## 2020-09-21 PROCEDURE — 90662 IIV NO PRSV INCREASED AG IM: CPT

## 2020-09-21 PROCEDURE — 36415 COLL VENOUS BLD VENIPUNCTURE: CPT | Performed by: FAMILY MEDICINE

## 2020-09-21 PROCEDURE — G0438 PPPS, INITIAL VISIT: HCPCS | Performed by: FAMILY MEDICINE

## 2020-09-21 PROCEDURE — 99213 OFFICE O/P EST LOW 20 MIN: CPT | Performed by: FAMILY MEDICINE

## 2020-09-21 RX ORDER — METOPROLOL TARTRATE 100 MG/1
100 TABLET ORAL EVERY 12 HOURS
Qty: 180 TABLET | Refills: 3 | Status: SHIPPED | OUTPATIENT
Start: 2020-09-21 | End: 2021-01-04 | Stop reason: SDUPTHER

## 2020-09-21 NOTE — PATIENT INSTRUCTIONS
Reviewed health hx and meds  Doing well  Continue to control cardiovascular risk as can, she will continue to see [facility] Cardiology  Continues on anticoagulation with Eliquis via them  Fluid status appears stable with Lasix/furosemide 40 mg in the morning, 20 mg in the p m  Luz Marina Jamaal Use medication as is  Uses compression stockings at times/as needed  Home glucometer readings have been improved, A1c at visit back in March had risen to 9 2, she had been off medication at that time, back on glimepiride 0 5 mg daily  Redo A1c  Use medication as is, watch for low readings  ( lowest recently = 98 )    We did review previous blood work, I would like her to redo fasting blood work  Re Lipids, cholesterol last year 140 with HDL 52, LDL 57  Await redo  Immunization History   Administered Date(s) Administered    INFLUENZA 10/15/2015, 11/01/2017    Influenza Split High Dose Preservative Free IM 09/23/2014, 10/24/2015, 09/27/2016    Influenza TIV (IM) 01/21/2009, 10/01/2011, 10/01/2012    Influenza, high dose seasonal 0 7 mL 11/13/2018, 11/07/2019    Pneumococcal 10/18/2015    Pneumococcal Conjugate 13-Valent 03/12/2015    Pneumococcal Polysaccharide PPV23 07/22/2003    Zoster 03/01/2011       Discussed Vaccines,    Prevnar  is up to date   Pneumovax  is up to date  She does do yearly Flu shot  ( today)  Tdap/tetanus shot will be done at a future date  (done every 10 yrs for superficial cuts, every 5 yrs for deep wounds)   Can also look into coverage for new shingles shot, Shingrix  Can do that at pharmacy  Was never a smoker    Routine screening for breast cancer, colon cancer not indicated  We discussed end of life planning, she does have a  "LIVING WILL"     Glaucoma screening is up-to-date  Sees Podiatry, Dr Adam Pierre    Discussed importance of routine healthy diet  208 lbs here today -  198 to 215 past few yrs      She will continue to see [facility] as needed regarding joint pain  We will see her back in 6 months, sooner as needed

## 2020-09-21 NOTE — LETTER
Diabetic Foot Exam Form    Date Requested: 20  Patient: Janina Epley  Patient : 1933   Referring Provider: Jackelyn Johnston DO    Diabetic Foot Exam Performed with shoes and socks removed        Yes         No     Date of Diabetic Foot Exam ______________________________  Risk Score ____________________________________________    Left Foot       Visual Inspection         Monofilament Testing Sensory Exam        Pedal Pulses         Additional Comments         Right Foot      Visual Inspection         Monofilament Testing Sensory Exam       Pedal Pulses         Additional Comments         Comments 2019 last foot exam in chart  Thank you  Practice Providing Exam ______________________________________________    Exam Performed By (print name) _______________________________________      Provider Signature ___________________________________________________      These reports are needed for  compliance    Please fax this completed form and a copy of the Diabetic Foot Exam report to our office located at [place] [address] as soon as possible to [phone] hillary Alarcon: Phone [phone]    We thank you for your assistance in treating our mutual patient

## 2020-09-21 NOTE — TELEPHONE ENCOUNTER
----- Message from Mayur Perez MA sent at 9/21/2020 10:56 AM EDT -----  Regarding: Foot exam / [facility]  09/21/20 10:57 AM    Hello, our patient Otilio Kirby has had Diabetic Foot Exam completed/performed   Please assist in updating the patient chart by making an External outreach to     Shannan Yanez MD  Podiatry   Phone: [phone]    Thank you,  Mayur Perez MA  [facility]

## 2020-09-21 NOTE — PROGRESS NOTES
Assessment and Plan:     Problem List Items Addressed This Visit        Endocrine    Type 2 diabetes mellitus with diabetic polyneuropathy, without long-term current use of insulin (HCC)    Relevant Orders    Basic metabolic panel    CBC    TSH, 3rd generation    Lipid panel    Hemoglobin A1C       Cardiovascular and Mediastinum    Atrial fibrillation (HCC)    Relevant Medications    metoprolol tartrate (LOPRESSOR) 100 mg tablet    Other Relevant Orders    Basic metabolic panel    CBC    TSH, 3rd generation    Coronary arteriosclerosis    Relevant Medications    metoprolol tartrate (LOPRESSOR) 100 mg tablet    Other Relevant Orders    Basic metabolic panel    CBC    TSH, 3rd generation    Lipid panel    Essential hypertension    Relevant Medications    metoprolol tartrate (LOPRESSOR) 100 mg tablet    Other Relevant Orders    Basic metabolic panel       Other    Hyperlipidemia    Relevant Orders    Lipid panel    Obesity    Relevant Orders    TSH, 3rd generation      Other Visit Diagnoses     Medicare annual wellness visit, subsequent    -  Primary    Flu vaccine need        Relevant Orders    influenza vaccine, high-dose, PF 0 7 mL (FLUZONE HIGH-DOSE) (Completed)           Preventive health issues were discussed with patient, and age appropriate screening tests were ordered as noted in patient's After Visit Summary  Personalized health advice and appropriate referrals for health education or preventive services given if needed, as noted in patient's After Visit Summary      See other note today regarding provider information       History of Present Illness:     Patient presents for Medicare Annual Wellness visit    Patient Care Team:  Leanne Sin DO as PCP - Argelia Abdul MD (Podiatry)     Problem List:     Patient Active Problem List   Diagnosis    Atrial fibrillation [facility])    Bilateral impacted cerumen    Bladder incontinence    Carotid artery stenosis    Type 2 diabetes mellitus with diabetic polyneuropathy, without long-term current use of insulin (HCC)    Coronary arteriosclerosis    Esophageal reflux    Hyperlipidemia    Essential hypertension    Lipoma    OA (osteoarthritis)    Overactive bladder    Right lumbar radiculopathy    Seborrheic dermatitis    Tricuspid valve disorders, non-rheumatic    Chronic combined systolic and diastolic congestive heart failure (HCC)    Obesity    Sinus bradycardia    Spinal stenosis of lumbar region    H/O scarlet fever    Mitral annular calcification    Chronic anticoagulation      Past Medical and Surgical History:     Past Medical History:   Diagnosis Date    Atrial fibrillation with rapid ventricular response ([facility])     RESOLVED: [id number]    Lumbar compression fracture ([facility])     RESOLVED: [id number]    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; SURGERY 1934    Nasal fracture     RESOLVED: [id number]     Past Surgical History:   Procedure Laterality Date    CATARACT EXTRACTION      COLONOSCOPY      ONSET: 1998    HYSTERECTOMY      REPLACEMENT TOTAL KNEE BILATERAL      ONSET: NOV 2011    SCALP EXCISION  1934    LESION;     TONSILLECTOMY      TRANSLUMINAL ANGIOPLASTY      INTRAOPERATIVE       Family History:     Family History   Problem Relation Age of Onset    Eclampsia Mother     Lung cancer Father     Diabetes Son     Breast cancer Family       Social History:        Social History     Socioeconomic History    Marital status: Single     Spouse name: None    Number of children: None    Years of education: None    Highest education level: None   Occupational History    None   Social Needs    Financial resource strain: None    Food insecurity     Worry: None     Inability: None    Transportation needs     Medical: None     Non-medical: None   Tobacco Use    Smoking status: Never Smoker    Smokeless tobacco: Never Used   Substance and Sexual Activity    Alcohol use: No    Drug use: No    Sexual activity: Not Currently Lifestyle    Physical activity     Days per week: None     Minutes per session: None    Stress: None   Relationships    Social connections     Talks on phone: None     Gets together: None     Attends [employer] service: None     Active member of club or organization: None     Attends meetings of clubs or organizations: None     Relationship status: None    Intimate partner violence     Fear of current or ex partner: None     Emotionally abused: None     Physically abused: None     Forced sexual activity: None   Other Topics Concern    None   Social History Narrative    None      Medications and Allergies:     Current Outpatient Medications   Medication Sig Dispense Refill    apixaban (ELIQUIS) 5 mg Take 5 mg by mouth 2 (two) times a day      atorvastatin (LIPITOR) 80 mg tablet Take 1 tablet by mouth      Cholecalciferol (VITAMIN D) 2000 units CAPS Take by mouth daily       cycloSPORINE (RESTASIS MULTIDOSE) 0 05 % ophthalmic emulsion Apply 1 drop to eye Medrol Dose Pack scheduling ONLY        ezetimibe (ZETIA) 10 mg tablet Take 10 mg by mouth daily        furosemide (LASIX) 20 mg tablet TAKE 2 TABLETS IN THE      MORNING AND 1 TABLET IN THEAFTERNOON OR AS DIRECTED (Patient taking differently: Use 2 in the morning, 1 in the afternoon) 270 tablet 3    glimepiride (AMARYL) 1 mg tablet TAKE 1/2 TABLET DAILY      (LOWER DOSE) 45 tablet 3    lisinopril (ZESTRIL) 20 mg tablet TAKE 1 TABLET DAILY 90 tablet 3    metoprolol tartrate (LOPRESSOR) 100 mg tablet Take 1 tablet (100 mg total) by mouth every 12 (twelve) hours 180 tablet 3    Multiple Vitamins-Minerals (HAIR SKIN AND NAILS FORMULA) TABS Take by mouth      albuterol (VENTOLIN HFA) 90 mcg/act inhaler Inhale 1-2 puffs every 4 (four) hours as needed       glucose blood (TRUETRACK TEST) test strip Use as instructed 100 each 3    nitroglycerin (NITROSTAT) 0 4 mg SL tablet Place under the tongue      penicillin V potassium (VEETID) 500 mg tablet as needed For dental procedures  No current facility-administered medications for this visit  Allergies   Allergen Reactions    Celecoxib      Reaction Date: [id number];     Latex     Adhesive [Medical Tape] Rash    Iodinated Diagnostic Agents Rash    Sulfa Antibiotics Rash      Immunizations:     Immunization History   Administered Date(s) Administered    INFLUENZA 10/15/2015, 11/01/2017    Influenza Split High Dose Preservative Free IM 09/23/2014, 10/24/2015, 09/27/2016    Influenza TIV (IM) 01/21/2009, 10/01/2011, 10/01/2012    Influenza, high dose seasonal 0 7 mL 11/13/2018, 11/07/2019, 09/21/2020    Pneumococcal 10/18/2015    Pneumococcal Conjugate 13-Valent 03/12/2015    Pneumococcal Polysaccharide PPV23 07/22/2003    Zoster 03/01/2011      Health Maintenance: There are no preventive care reminders to display for this patient  Topic Date Due    DTaP,Tdap,and Td Vaccines (1 - Tdap) 11/18/1954      Medicare Health Risk Assessment:     /80 (BP Location: Left arm, Patient Position: Sitting, Cuff Size: Standard)   Pulse 100   Temp (!) 97 3 °F (36 3 °C)   Ht 5' 1" (1 549 m)   Wt 94 3 kg (208 lb)   SpO2 98%   BMI 39 30 kg/m²      Abdon Hutchinson is here for her Subsequent Wellness visit  Health Risk Assessment:   Patient rates overall health as good  Patient feels that their physical health rating is same  Eyesight was rated as slightly worse  Hearing was rated as slightly worse  Patient feels that their emotional and mental health rating is slightly better  Pain experienced in the last 7 days has been some  Patient's pain rating has been 3/10  Patient states that she has experienced no weight loss or gain in last 6 months  Depression Screening:   PHQ-2 Score: 0      Fall Risk Screening: In the past year, patient has experienced: no history of falling in past year      Urinary Incontinence Screening:   Patient has leaked urine accidently in the last six months       Home Safety:  Patient has trouble with stairs inside or outside of their home  Patient has working smoke alarms and has working carbon monoxide detector  Home safety hazards include: none  Nutrition:   Current diet is Low Saturated Fat and Limited junk food  Medications:   Patient is currently taking over-the-counter supplements  OTC medications include: see medication list  Patient is able to manage medications  Activities of Daily Living (ADLs)/Instrumental Activities of Daily Living (IADLs):   Walk and transfer into and out of bed and chair?: Yes  Dress and groom yourself?: Yes    Bathe or shower yourself?: Yes    Feed yourself? Yes  Do your laundry/housekeeping?: Yes  Manage your money, pay your bills and track your expenses?: Yes  Make your own meals?: Yes    Do your own shopping?: Yes    Previous Hospitalizations:   Any hospitalizations or ED visits within the last 12 months?: No      Advance Care Planning:   Living will: Yes    Durable POA for healthcare:  Yes    Advanced directive: Yes      PREVENTIVE SCREENINGS      Cardiovascular Screening:    General: Screening Not Indicated and History Lipid Disorder      Diabetes Screening:     General: Screening Not Indicated and History Diabetes      Colorectal Cancer Screening:     General: Screening Not Indicated      Cervical Cancer Screening:    General: Screening Not Indicated      Abdominal Aortic Aneurysm (AAA) Screening:        General: Screening Not Indicated and History AAA      Lung Cancer Screening:     General: Screening Not Indicated      Zuleyma Robles DO

## 2020-09-21 NOTE — PROGRESS NOTES
BMI Counseling: Body mass index is 39 3 kg/m²  The BMI is above normal  Nutrition recommendations include encouraging healthy choices of fruits and vegetables and moderation in carbohydrate intake  Depression Screening and Follow-up Plan: Patient's depression screening was positive with a PHQ-2 score of 0  Clincally patient does not have depression  No treatment is required  Arash PRICILLA  [address] [facility]  [address]  [address] [place], [place], [address]     MEDICARE SUBSEQUENT VISIT NOTE ( part 1 )      NAME: Talat Pelaez  AGE: 80 y o   SEX: female  : 1933   MRN: [id number]    DATE: 2020  TIME: 11:10 AM    Assessment and Plan     Problem List Items Addressed This Visit        Endocrine    Type 2 diabetes mellitus with diabetic polyneuropathy, without long-term current use of insulin (HCC)    Relevant Orders    Basic metabolic panel    CBC    TSH, 3rd generation    Lipid panel    Hemoglobin A1C       Cardiovascular and Mediastinum    Atrial fibrillation (HCC)    Relevant Medications    metoprolol tartrate (LOPRESSOR) 100 mg tablet    Other Relevant Orders    Basic metabolic panel    CBC    TSH, 3rd generation    Coronary arteriosclerosis    Relevant Medications    metoprolol tartrate (LOPRESSOR) 100 mg tablet    Other Relevant Orders    Basic metabolic panel    CBC    TSH, 3rd generation    Lipid panel    Essential hypertension    Relevant Medications    metoprolol tartrate (LOPRESSOR) 100 mg tablet    Other Relevant Orders    Basic metabolic panel       Other    Hyperlipidemia    Relevant Orders    Lipid panel    Obesity    Relevant Orders    TSH, 3rd generation      Other Visit Diagnoses     Medicare annual wellness visit, subsequent    -  Primary    Flu vaccine need        Relevant Orders    influenza vaccine, high-dose, PF 0 7 mL (FLUZONE HIGH-DOSE) (Completed)          Medicare Wellness Counseling/ Discussion    Patient Instructions     Reviewed health hx and meds  Doing well  Continue to control cardiovascular risk as can, she will continue to see [facility]iology  Continues on anticoagulation with Eliquis via them  Fluid status appears stable with Lasix/furosemide 40 mg in the morning, 20 mg in the p m  Kathy [place] Use medication as is  Uses compression stockings at times/as needed  Home glucometer readings have been improved, A1c at visit back in March had risen to 9 2, she had been off medication at that time, back on glimepiride 0 5 mg daily  Redo A1c  Use medication as is, watch for low readings  ( lowest recently = 98 )    We did review previous blood work, I would like her to redo fasting blood work  Re Lipids, cholesterol last year 140 with HDL 52, LDL 57  Await redo  Immunization History   Administered Date(s) Administered    INFLUENZA 10/15/2015, 11/01/2017    Influenza Split High Dose Preservative Free IM 09/23/2014, 10/24/2015, 09/27/2016    Influenza TIV (IM) 01/21/2009, 10/01/2011, 10/01/2012    Influenza, high dose seasonal 0 7 mL 11/13/2018, 11/07/2019    Pneumococcal 10/18/2015    Pneumococcal Conjugate 13-Valent 03/12/2015    Pneumococcal Polysaccharide PPV23 07/22/2003    Zoster 03/01/2011       Discussed Vaccines,    Prevnar  is up to date   Pneumovax  is up to date  She does do yearly Flu shot  ( today)  Tdap/tetanus shot will be done at a future date  (done every 10 yrs for superficial cuts, every 5 yrs for deep wounds)   Can also look into coverage for new shingles shot, Shingrix  Can do that at pharmacy  Was never a smoker    Routine screening for breast cancer, colon cancer not indicated  We discussed end of life planning, she does have a  "LIVING WILL"     Glaucoma screening is up-to-date  Sees Podiatry, Dr Minal Johansen    Discussed importance of routine healthy diet  208 lbs here today -  198 to 215 past few yrs      She will continue to see [facility] as needed regarding joint pain  We will see her back in 6 months, sooner as needed  Chief Complaint     Chief Complaint   Patient presents with   [facility] Wellness Visit     c/o hands pain        History of Present Illness     Shanda Henry is a 80y o -year-old female who is in today for a 6 month check/annual wellness visit  She has been feeling well overall  At her visit in March we placed her back on glimepiride 0 5 mg daily, home readings have been improved, still somewhat high in the morning, has run down into the 90s, no lower  She continues to see Cardiology, continues on anticoagulation without bleeding  She has gained some weight back but does not feel this is fluid retention  Attributes it to diet  Review of Systems     Review of Systems   Constitutional: Negative for appetite change, fatigue (Appropriate for age), fever and unexpected weight change  HENT: Negative for sore throat and trouble swallowing  Respiratory: Negative for cough, chest tightness and shortness of breath  Cardiovascular: Positive for chest pain (Longstanding intermittent, no worse than baseline) and palpitations (Baseline)  Negative for leg swelling  Gastrointestinal: Negative for abdominal pain, blood in stool, nausea and vomiting  No acid reflux     No change in bowel   Genitourinary: Negative for dysuria and hematuria  Musculoskeletal:        Pain in hands, recent hives on hands, improving   Neurological: Negative for dizziness, syncope, light-headedness and headaches  Hematological: Bruises/bleeds easily (On anticoagulation, no bleeding)  Psychiatric/Behavioral: Negative for behavioral problems and confusion         Active Problem List     Patient Active Problem List   Diagnosis    Atrial fibrillation ([address] )    Bilateral impacted cerumen    Bladder incontinence    Carotid artery stenosis    Type 2 diabetes mellitus with diabetic polyneuropathy, without long-term current use of insulin ([facility] [address] )    Coronary arteriosclerosis    Esophageal reflux    Hyperlipidemia    Essential hypertension    Lipoma    OA (osteoarthritis)    Overactive bladder    Right lumbar radiculopathy    Seborrheic dermatitis    Tricuspid valve disorders, non-rheumatic    Chronic combined systolic and diastolic congestive heart failure (HCC)    Obesity    Sinus bradycardia    Spinal stenosis of lumbar region    H/O scarlet fever    Mitral annular calcification    Chronic anticoagulation       Past Medical History:  Past Medical History:   Diagnosis Date    Atrial fibrillation with rapid ventricular response (HCC)     RESOLVED: [id number]    Lumbar compression fracture (HCC)     RESOLVED: [id number]    Meningocele (HCC)     ACQUIRED SPINAL CORD; SURGERY 1934    Nasal fracture     RESOLVED: [id number]       Past Surgical History:  Past Surgical History:   Procedure Laterality Date    CATARACT EXTRACTION      COLONOSCOPY      ONSET: 1998    HYSTERECTOMY      REPLACEMENT TOTAL KNEE BILATERAL      ONSET: NOV 2011    SCALP EXCISION  1934    LESION;     TONSILLECTOMY      TRANSLUMINAL ANGIOPLASTY      INTRAOPERATIVE        Family History:  Family History   Problem Relation Age of Onset    Eclampsia Mother     Lung cancer Father     Diabetes Son     Breast cancer Family        Social History:  Social History     Tobacco Use    Smoking status: Never Smoker    Smokeless tobacco: Never Used   Substance Use Topics    Alcohol use: No       Objective     Vitals:    09/21/20 1018   BP: 132/80   BP Location: Left arm   Patient Position: Sitting   Cuff Size: Standard   Pulse: 100   Temp: (!) 97 3 °F (36 3 °C)   SpO2: 98%   Weight: 94 3 kg (208 lb)   Height: 5' 1" (1 549 m)     Body mass index is 39 3 kg/m²      BP Readings from Last 3 Encounters:   09/21/20 132/80   03/19/20 122/80   11/19/19 106/60       Wt Readings from Last 3 Encounters:   09/21/20 94 3 kg (208 lb)   03/19/20 89 8 kg (198 lb)   11/19/19 97 5 kg (215 lb)       Physical Exam  Constitutional:       General: She is not in acute distress  Appearance: She is well-developed  She is obese  She is not ill-appearing  Eyes:      General: No scleral icterus  Cardiovascular:      Comments: Irregularly irregular at controlled rate  Pulmonary:      Effort: Pulmonary effort is normal  No respiratory distress  Breath sounds: Normal breath sounds  Abdominal:      Palpations: Abdomen is soft  Tenderness: There is no abdominal tenderness  Musculoskeletal:      Right lower leg: No edema  Left lower leg: No edema  Lymphadenopathy:      Cervical: No cervical adenopathy  Skin:     Coloration: Skin is not jaundiced  Neurological:      Mental Status: She is alert and oriented to person, place, and time  Mental status is at baseline     Psychiatric:         Mood and Affect: Mood normal          Behavior: Behavior normal          ALLERGIES:  Allergies   Allergen Reactions    Celecoxib      Reaction Date: [id number];     Latex     Adhesive [Medical Tape] Rash    Iodinated Diagnostic Agents Rash    Sulfa Antibiotics Rash       Current Medications     Current Outpatient Medications   Medication Sig Dispense Refill    apixaban (ELIQUIS) 5 mg Take 5 mg by mouth 2 (two) times a day      atorvastatin (LIPITOR) 80 mg tablet Take 1 tablet by mouth      Cholecalciferol (VITAMIN D) 2000 units CAPS Take by mouth daily       cycloSPORINE (RESTASIS MULTIDOSE) 0 05 % ophthalmic emulsion Apply 1 drop to eye Medrol Dose Pack scheduling ONLY        ezetimibe (ZETIA) 10 mg tablet Take 10 mg by mouth daily        furosemide (LASIX) 20 mg tablet TAKE 2 TABLETS IN THE      MORNING AND 1 TABLET IN THEAFTERNOON OR AS DIRECTED (Patient taking differently: Use 2 in the morning, 1 in the afternoon) 270 tablet 3    glimepiride (AMARYL) 1 mg tablet TAKE 1/2 TABLET DAILY      (LOWER DOSE) 45 tablet 3    lisinopril (ZESTRIL) 20 mg tablet TAKE 1 TABLET DAILY 90 tablet 3    metoprolol tartrate (LOPRESSOR) 100 mg tablet Take 1 tablet (100 mg total) by mouth every 12 (twelve) hours 180 tablet 3    Multiple Vitamins-Minerals (HAIR SKIN AND NAILS FORMULA) TABS Take by mouth      albuterol (VENTOLIN HFA) 90 mcg/act inhaler Inhale 1-2 puffs every 4 (four) hours as needed       glucose blood (TRUETRACK TEST) test strip Use as instructed 100 each 3    nitroglycerin (NITROSTAT) 0 4 mg SL tablet Place under the tongue      penicillin V potassium (VEETID) 500 mg tablet as needed For dental procedures  No current facility-administered medications for this visit            Health Maintenance     See other note today re clinical AWV info             Most recent labs available from [address] [place] [address]   ( others may be available in Care Everywhere / Media sections)  Lab Results   Component Value Date    WBC 6 88 03/30/2020    HGB 13 9 03/30/2020    HCT 42 7 03/30/2020     03/30/2020    CHOL 186 12/01/2015    TRIG 157 (H) 02/26/2019    HDL 52 02/26/2019    ALT 20 03/30/2020    AST 28 03/30/2020     12/01/2015    K 3 5 03/30/2020     03/30/2020    CREATININE 1 03 03/30/2020    BUN 23 03/30/2020    CO2 29 03/30/2020    GLUF 140 (H) 03/30/2020    HGBA1C 9 2 (A) 03/19/2020       Orders Placed This Encounter   Procedures    influenza vaccine, high-dose, PF 0 7 mL (FLUZONE HIGH-DOSE)    Basic metabolic panel    CBC    TSH, 3rd generation    Lipid panel    Hemoglobin A1C             Lc Dey DO

## 2020-09-21 NOTE — TELEPHONE ENCOUNTER
Upon review of the In Basket request and the patient's chart, initial outreach has been made via fax, please see Contacts section for details  A second outreach attempt will be made within 5 business days      Thank you  Abimael Burrell MA

## 2020-09-22 NOTE — TELEPHONE ENCOUNTER
Upon review of the In Basket request we were able to locate, review, and update the patient chart as requested for Diabetic Foot Exam     Any additional questions or concerns should be emailed to the Practice Liaisons via [email or web address] email, please do not reply via In Basket      Thank you  Lexie Milner MA

## 2020-10-26 DIAGNOSIS — I25.10 CORONARY ARTERIOSCLEROSIS: Primary | ICD-10-CM

## 2020-10-26 RX ORDER — NITROGLYCERIN 0.4 MG/1
0.4 TABLET SUBLINGUAL AS NEEDED
Qty: 25 TABLET | Refills: 0 | Status: SHIPPED | OUTPATIENT
Start: 2020-10-26

## 2021-01-04 DIAGNOSIS — I25.10 CORONARY ARTERIOSCLEROSIS: ICD-10-CM

## 2021-01-04 DIAGNOSIS — I10 ESSENTIAL HYPERTENSION: ICD-10-CM

## 2021-01-04 DIAGNOSIS — I48.21 PERMANENT ATRIAL FIBRILLATION (HCC): ICD-10-CM

## 2021-01-04 RX ORDER — METOPROLOL TARTRATE 100 MG/1
100 TABLET ORAL EVERY 12 HOURS
Qty: 180 TABLET | Refills: 3 | Status: SHIPPED | OUTPATIENT
Start: 2021-01-04 | End: 2021-01-15 | Stop reason: SDUPTHER

## 2021-01-15 ENCOUNTER — TELEPHONE (OUTPATIENT)
Dept: FAMILY MEDICINE CLINIC | Facility: CLINIC | Age: 86
End: 2021-01-15

## 2021-01-15 DIAGNOSIS — I48.21 PERMANENT ATRIAL FIBRILLATION (HCC): ICD-10-CM

## 2021-01-15 DIAGNOSIS — I25.10 CORONARY ARTERIOSCLEROSIS: ICD-10-CM

## 2021-01-15 DIAGNOSIS — I10 ESSENTIAL HYPERTENSION: ICD-10-CM

## 2021-01-15 RX ORDER — METOPROLOL TARTRATE 100 MG/1
100 TABLET ORAL EVERY 12 HOURS
Qty: 60 TABLET | Refills: 1 | Status: SHIPPED | OUTPATIENT
Start: 2021-01-15 | End: 2021-12-05

## 2021-01-15 NOTE — TELEPHONE ENCOUNTER
Prescription for metoprolol sent to local [facility] pharmacy as mail order has not been received yet

## 2021-01-15 NOTE — TELEPHONE ENCOUNTER
Patient need Rx for Metoprolol be send to local [facility] , still waiting for shipment from mail pharmacy   Patient is out of medications  Please advised

## 2021-03-30 ENCOUNTER — TRANSCRIBE ORDERS (OUTPATIENT)
Dept: ADMINISTRATIVE | Facility: HOSPITAL | Age: 86
End: 2021-03-30

## 2021-03-30 ENCOUNTER — OFFICE VISIT (OUTPATIENT)
Dept: FAMILY MEDICINE CLINIC | Facility: CLINIC | Age: 86
End: 2021-03-30
Payer: COMMERCIAL

## 2021-03-30 ENCOUNTER — APPOINTMENT (OUTPATIENT)
Dept: LAB | Facility: MEDICAL CENTER | Age: 86
End: 2021-03-30
Payer: COMMERCIAL

## 2021-03-30 VITALS
SYSTOLIC BLOOD PRESSURE: 118 MMHG | BODY MASS INDEX: 38.17 KG/M2 | WEIGHT: 202 LBS | HEART RATE: 87 BPM | TEMPERATURE: 97.7 F | OXYGEN SATURATION: 98 % | DIASTOLIC BLOOD PRESSURE: 84 MMHG

## 2021-03-30 DIAGNOSIS — Z79.84 LONG TERM CURRENT USE OF ORAL HYPOGLYCEMIC DRUG: ICD-10-CM

## 2021-03-30 DIAGNOSIS — I48.20 CHRONIC ATRIAL FIBRILLATION (HCC): ICD-10-CM

## 2021-03-30 DIAGNOSIS — E11.42 TYPE 2 DIABETES MELLITUS WITH DIABETIC POLYNEUROPATHY, WITHOUT LONG-TERM CURRENT USE OF INSULIN (HCC): Primary | ICD-10-CM

## 2021-03-30 DIAGNOSIS — Z79.01 CHRONIC ANTICOAGULATION: ICD-10-CM

## 2021-03-30 DIAGNOSIS — E78.00 HYPERCHOLESTEREMIA: ICD-10-CM

## 2021-03-30 DIAGNOSIS — I10 ESSENTIAL HYPERTENSION: ICD-10-CM

## 2021-03-30 DIAGNOSIS — E66.01 CLASS 2 SEVERE OBESITY WITH SERIOUS COMORBIDITY AND BODY MASS INDEX (BMI) OF 38.0 TO 38.9 IN ADULT, UNSPECIFIED OBESITY TYPE (HCC): ICD-10-CM

## 2021-03-30 DIAGNOSIS — S00.01XA: ICD-10-CM

## 2021-03-30 DIAGNOSIS — I25.10 CORONARY ARTERIOSCLEROSIS: ICD-10-CM

## 2021-03-30 DIAGNOSIS — I42.0 DILATED CARDIOMYOPATHY (HCC): ICD-10-CM

## 2021-03-30 DIAGNOSIS — I48.21 PERMANENT ATRIAL FIBRILLATION (HCC): ICD-10-CM

## 2021-03-30 DIAGNOSIS — I48.20 CHRONIC ATRIAL FIBRILLATION (HCC): Primary | ICD-10-CM

## 2021-03-30 DIAGNOSIS — E78.5 HYPERLIPIDEMIA, UNSPECIFIED HYPERLIPIDEMIA TYPE: ICD-10-CM

## 2021-03-30 DIAGNOSIS — I50.42 CHRONIC COMBINED SYSTOLIC AND DIASTOLIC CONGESTIVE HEART FAILURE (HCC): ICD-10-CM

## 2021-03-30 LAB
ALBUMIN SERPL BCP-MCNC: 3.8 G/DL (ref 3.5–5)
ALP SERPL-CCNC: 110 U/L (ref 46–116)
ALT SERPL W P-5'-P-CCNC: 19 U/L (ref 12–78)
ANION GAP SERPL CALCULATED.3IONS-SCNC: 4 MMOL/L (ref 4–13)
AST SERPL W P-5'-P-CCNC: 23 U/L (ref 5–45)
BILIRUB SERPL-MCNC: 1.45 MG/DL (ref 0.2–1)
BUN SERPL-MCNC: 25 MG/DL (ref 5–25)
CALCIUM SERPL-MCNC: 9.6 MG/DL (ref 8.3–10.1)
CHLORIDE SERPL-SCNC: 107 MMOL/L (ref 100–108)
CO2 SERPL-SCNC: 27 MMOL/L (ref 21–32)
CREAT SERPL-MCNC: 1.13 MG/DL (ref 0.6–1.3)
ERYTHROCYTE [DISTWIDTH] IN BLOOD BY AUTOMATED COUNT: 13.2 % (ref 11.6–15.1)
GFR SERPL CREATININE-BSD FRML MDRD: 44 ML/MIN/1.73SQ M
GLUCOSE P FAST SERPL-MCNC: 172 MG/DL (ref 65–99)
HCT VFR BLD AUTO: 41.3 % (ref 34.8–46.1)
HGB BLD-MCNC: 13.6 G/DL (ref 11.5–15.4)
MCH RBC QN AUTO: 32.5 PG (ref 26.8–34.3)
MCHC RBC AUTO-ENTMCNC: 32.9 G/DL (ref 31.4–37.4)
MCV RBC AUTO: 99 FL (ref 82–98)
PLATELET # BLD AUTO: 220 THOUSANDS/UL (ref 149–390)
PMV BLD AUTO: 9.8 FL (ref 8.9–12.7)
POTASSIUM SERPL-SCNC: 4.2 MMOL/L (ref 3.5–5.3)
PROT SERPL-MCNC: 7.4 G/DL (ref 6.4–8.2)
RBC # BLD AUTO: 4.18 MILLION/UL (ref 3.81–5.12)
SL AMB POCT HEMOGLOBIN AIC: 8.4 (ref ?–6.5)
SODIUM SERPL-SCNC: 138 MMOL/L (ref 136–145)
WBC # BLD AUTO: 6.99 THOUSAND/UL (ref 4.31–10.16)

## 2021-03-30 PROCEDURE — 80053 COMPREHEN METABOLIC PANEL: CPT | Performed by: FAMILY MEDICINE

## 2021-03-30 PROCEDURE — 36415 COLL VENOUS BLD VENIPUNCTURE: CPT | Performed by: FAMILY MEDICINE

## 2021-03-30 PROCEDURE — 99214 OFFICE O/P EST MOD 30 MIN: CPT | Performed by: FAMILY MEDICINE

## 2021-03-30 PROCEDURE — 83036 HEMOGLOBIN GLYCOSYLATED A1C: CPT | Performed by: FAMILY MEDICINE

## 2021-03-30 PROCEDURE — 85027 COMPLETE CBC AUTOMATED: CPT | Performed by: FAMILY MEDICINE

## 2021-03-30 NOTE — PATIENT INSTRUCTIONS
She did have some type of surgical repair occiput when she was an infant, describes something being herniated and was 'sewn up', has had no issues until recently noted slightly swollen area with small amount of bleeding, see photo  She will use topical antibiotic 3 times daily for 5 to 7 days, call us if worsens  She does continue on Eliquis, no other bleeding  She does continue to see Cardiology, weight has been stable in 200 range  She will continue furosemide 40 mg in the morning, 20 mg in the p m  Jackie Dallas She will be doing blood work today for Cardiology, I would like her to include CMP/CBC  Home sugar readings have been up to 125, rarely runs low, A1c redone here today has climbed from 7 8 to 8 4 which I feel is acceptable, she will continue glimepiride 1 mg tablet 1/2 daily  Last blood testing in September, hemoglobin 13 6, BUN/creatinine 22/1 09 with potassium 3 9, TSH 1 33, cholesterol 184 with HDL 63, LDL 98  She does plan to see her orthopedist next month regarding right hip  We will see her again in 6 months with an annual wellness check  She did receive her COVID vaccine series

## 2021-03-30 NOTE — PROGRESS NOTES
BMI Counseling: Body mass index is 38 17 kg/m²  The BMI is above normal  Nutrition recommendations include encouraging healthy choices of fruits and vegetables and moderation in carbohydrate intake  FAMILY PRACTICE OFFICE VISIT  [facility]  [address], [address]      NAME: Latonya Guzman  AGE: 80 y o  SEX: female  : 1933   MRN: [id number]    DATE: 3/30/2021  TIME: 11:42 AM    Assessment and Plan     Problem List Items Addressed This Visit        Endocrine    Type 2 diabetes mellitus with diabetic polyneuropathy, without long-term current use of insulin ([facility] [address] ) - Primary    Relevant Orders    POCT hemoglobin A1c (Completed)       Cardiovascular and Mediastinum    Atrial fibrillation (HCC)    Coronary arteriosclerosis    Essential hypertension    Chronic combined systolic and diastolic congestive heart failure (HCC)    Relevant Orders    Comprehensive metabolic panel    CBC       Other    Hyperlipidemia    Obesity    Chronic anticoagulation      Other Visit Diagnoses     Abrasion of scalp without infection              Patient Instructions   She did have some type of surgical repair occiput when she was an infant, describes something being herniated and was 'sewn up', has had no issues until recently noted slightly swollen area with small amount of bleeding, see photo  She will use topical antibiotic 3 times daily for 5 to 7 days, call us if worsens  She does continue on Eliquis, no other bleeding  She does continue to see Cardiology, weight has been stable in 200 range  She will continue furosemide 40 mg in the morning, 20 mg in the p m  Hernan [place] She will be doing blood work today for Cardiology, I would like her to include CMP/CBC      Home sugar readings have been up to 125, rarely runs low, A1c redone here today has climbed from 7 8 to 8 4 which I feel is acceptable, she will continue glimepiride 1 mg tablet 1/2 daily  Last blood testing in September, hemoglobin 13 6, BUN/creatinine 22/1 09 with potassium 3 9, TSH 1 33, cholesterol 184 with HDL 63, LDL 98  She does plan to see her orthopedist next month regarding right hip  We will see her again in 6 months with an annual wellness check  She did receive her COVID vaccine series  Chief Complaint     Chief Complaint   Patient presents with    Follow-up       History of Present Illness   Lefty Rico is a 80y o -year-old female who is in today for routine follow-up, overall she has been feeling well, no increased fluid retention or increased shortness of breath  Continues on Lasix 40 mg in the morning, 20 mg in the afternoon  Home sugar readings for the most part run less than 125, has had no low readings  Continues on chronic anticoagulation, no bleeding other than she noted a spot of blood right occipital, slightly tender recently at this area, today describes surgery when she was an infant for bulge at this area, was sewn up       Review of Systems   Review of Systems   Constitutional: Negative for appetite change, fatigue, fever and unexpected weight change  HENT: Negative for sore throat and trouble swallowing  Respiratory: Negative for cough, chest tightness and shortness of breath (Baseline mild dyspnea on exertion)  Cardiovascular: Positive for leg swelling (Baseline)  Negative for chest pain and palpitations (History AFib, no increased palpitations)  Gastrointestinal: Negative for abdominal pain, blood in stool, nausea and vomiting  No acid reflux     No change in bowel   Genitourinary: Negative for dysuria and hematuria  Neurological: Negative for dizziness, syncope, light-headedness and headaches  Hematological: Bruises/bleeds easily  Psychiatric/Behavioral: Negative for behavioral problems and confusion         Active Problem List     Patient Active Problem List   Diagnosis    Atrial fibrillation ([facility] [address] )  Bilateral impacted cerumen    Bladder incontinence    Carotid artery stenosis    Type 2 diabetes mellitus with diabetic polyneuropathy, without long-term current use of insulin (HCC)    Coronary arteriosclerosis    Esophageal reflux    Hyperlipidemia    Essential hypertension    Lipoma    OA (osteoarthritis)    Overactive bladder    Right lumbar radiculopathy    Seborrheic dermatitis    Tricuspid valve disorders, non-rheumatic    Chronic combined systolic and diastolic congestive heart failure (HCC)    Obesity    Sinus bradycardia    Spinal stenosis of lumbar region    H/O scarlet fever    Mitral annular calcification    Chronic anticoagulation       Past Medical History:  Reviewed    Past Surgical History:  Reviewed    Family History:  Reviewed    Social History:  Reviewed    Objective     Vitals:    03/30/21 1010   BP: 118/84   BP Location: Right arm   Patient Position: Sitting   Cuff Size: Standard   Pulse: 87   Temp: 97 7 °F (36 5 °C)   SpO2: 98%   Weight: 91 6 kg (202 lb)     Body mass index is 38 17 kg/m²  BP Readings from Last 3 Encounters:   03/30/21 118/84   09/21/20 132/80   03/19/20 122/80       Wt Readings from Last 3 Encounters:   03/30/21 91 6 kg (202 lb)   09/21/20 94 3 kg (208 lb)   03/19/20 89 8 kg (198 lb)       Physical Exam  Constitutional:       General: She is not in acute distress  Appearance: Normal appearance  She is well-developed  She is obese  She is not ill-appearing  Eyes:      General: No scleral icterus  Cardiovascular:      Comments: Irregularly irregular at controlled rate  Pulmonary:      Effort: Pulmonary effort is normal  No respiratory distress  Breath sounds: Normal breath sounds  Abdominal:      Palpations: Abdomen is soft  Tenderness: There is no abdominal tenderness  Musculoskeletal:      Right lower leg: No edema  Left lower leg: No edema  Lymphadenopathy:      Cervical: No cervical adenopathy     Skin:     Comments: See photo   Neurological:      Mental Status: She is alert and oriented to person, place, and time  Psychiatric:         Mood and Affect: Mood normal          Behavior: Behavior normal                 ALLERGIES:  Allergies   Allergen Reactions    Celecoxib      Reaction Date: [id number];     Latex     Adhesive [Medical Tape] Rash    Iodinated Diagnostic Agents Rash    Sulfa Antibiotics Rash       Current Medications     Current Outpatient Medications   Medication Sig Dispense Refill    albuterol (VENTOLIN HFA) 90 mcg/act inhaler Inhale 1-2 puffs every 4 (four) hours as needed       apixaban (ELIQUIS) 5 mg Take 5 mg by mouth 2 (two) times a day      atorvastatin (LIPITOR) 80 mg tablet Take 1 tablet by mouth      Cholecalciferol (VITAMIN D) 2000 units CAPS Take by mouth daily       cycloSPORINE (RESTASIS MULTIDOSE) 0 05 % ophthalmic emulsion Apply 1 drop to eye Medrol Dose Pack scheduling ONLY        ezetimibe (ZETIA) 10 mg tablet Take 10 mg by mouth daily        furosemide (LASIX) 20 mg tablet TAKE 2 TABLETS IN THE      MORNING AND 1 TABLET IN THEAFTERNOON OR AS DIRECTED (Patient taking differently: Use 2 in the morning, 1 in the afternoon) 270 tablet 3    glimepiride (AMARYL) 1 mg tablet TAKE 1/2 TABLET DAILY      (LOWER DOSE) 45 tablet 3    glucose blood (TRUETRACK TEST) test strip Use as instructed 100 each 3    lisinopril (ZESTRIL) 20 mg tablet TAKE 1 TABLET DAILY 90 tablet 3    metoprolol tartrate (LOPRESSOR) 100 mg tablet Take 1 tablet (100 mg total) by mouth every 12 (twelve) hours 60 tablet 1    Multiple Vitamins-Minerals (HAIR SKIN AND NAILS FORMULA) TABS Take by mouth      nitroglycerin (Nitrostat) 0 4 mg SL tablet Place 1 tablet (0 4 mg total) under the tongue as needed for chest pain 25 tablet 0    penicillin V potassium (VEETID) 500 mg tablet as needed For dental procedures  No current facility-administered medications for this visit               Orders Placed This Encounter Procedures    Comprehensive metabolic panel    CBC    POCT hemoglobin A1c         Angela Hamilton DO

## 2021-04-09 DIAGNOSIS — I10 ESSENTIAL HYPERTENSION: ICD-10-CM

## 2021-04-09 RX ORDER — LISINOPRIL 20 MG/1
TABLET ORAL
Qty: 90 TABLET | Refills: 3 | Status: SHIPPED | OUTPATIENT
Start: 2021-04-09 | End: 2022-04-10

## 2021-04-28 DIAGNOSIS — E11.9 CONTROLLED TYPE 2 DIABETES MELLITUS WITHOUT COMPLICATION, WITHOUT LONG-TERM CURRENT USE OF INSULIN (HCC): ICD-10-CM

## 2021-04-28 NOTE — TELEPHONE ENCOUNTER
Pharmacy called requesting a refill of:    Glimepiride (AMARYL) 1 mg tablet  #45 / R-3    To: [facility]    Last OV 3/30/2021  Future OV None scheduled

## 2021-04-29 RX ORDER — GLIMEPIRIDE 1 MG/1
TABLET ORAL
Qty: 45 TABLET | Refills: 3 | Status: SHIPPED | OUTPATIENT
Start: 2021-04-29 | End: 2022-02-28 | Stop reason: SDUPTHER

## 2021-05-20 ENCOUNTER — TELEPHONE (OUTPATIENT)
Dept: FAMILY MEDICINE CLINIC | Facility: CLINIC | Age: 86
End: 2021-05-20

## 2021-05-20 DIAGNOSIS — L98.9 DISORDER OF SCALP: Primary | ICD-10-CM

## 2021-05-20 NOTE — TELEPHONE ENCOUNTER
I would like her to see Dermatology, let her know  I am not sure how long it will take to get in  I can place an order for [facility], they are located in [place] or [facility], you can give her numbers for local Dermatology if she is unable to get a ride to those locations  Let me know  We will also re-evaluate at her next appointment, she can come in sooner with us if needed    My last office note has information regarding area, she had surgery when she was an infant    We will have Dermatology evaluate outer skin, see no need for imaging otherwise at present

## 2021-05-20 NOTE — TELEPHONE ENCOUNTER
I called patient and she would like to see [facility]  She is willing to go to [employer] office  Thank you!

## 2021-05-20 NOTE — TELEPHONE ENCOUNTER
Patient is getting more spots on the top of her head they are raised  [facility] no longer has theses records for the surgery done to her scalp because its been done much longer than 10 years  Patient stated more like 70 years ago  Thank you!

## 2021-05-24 NOTE — TELEPHONE ENCOUNTER
Called patient lmom letting her know that the order has been placed and if they did not call her in a couple days that she may call them  I left phone number and address of the provider  Thank you!

## 2021-09-16 DIAGNOSIS — I50.42 CHRONIC COMBINED SYSTOLIC AND DIASTOLIC HEART FAILURE (HCC): ICD-10-CM

## 2021-09-16 RX ORDER — FUROSEMIDE 20 MG/1
TABLET ORAL
Qty: 270 TABLET | Refills: 3 | Status: SHIPPED | OUTPATIENT
Start: 2021-09-16

## 2021-10-19 LAB
LEFT EYE DIABETIC RETINOPATHY: NORMAL
RIGHT EYE DIABETIC RETINOPATHY: NORMAL
SEVERITY (EYE EXAM): NORMAL

## 2021-12-04 DIAGNOSIS — I48.21 PERMANENT ATRIAL FIBRILLATION (HCC): ICD-10-CM

## 2021-12-04 DIAGNOSIS — I25.10 CORONARY ARTERIOSCLEROSIS: ICD-10-CM

## 2021-12-04 DIAGNOSIS — I10 ESSENTIAL HYPERTENSION: ICD-10-CM

## 2021-12-05 RX ORDER — METOPROLOL TARTRATE 100 MG/1
TABLET ORAL
Qty: 180 TABLET | Refills: 3 | Status: SHIPPED | OUTPATIENT
Start: 2021-12-05

## 2022-02-28 DIAGNOSIS — E11.9 CONTROLLED TYPE 2 DIABETES MELLITUS WITHOUT COMPLICATION, WITHOUT LONG-TERM CURRENT USE OF INSULIN (HCC): ICD-10-CM

## 2022-02-28 RX ORDER — GLIMEPIRIDE 1 MG/1
TABLET ORAL
Qty: 45 TABLET | Refills: 0 | Status: SHIPPED | OUTPATIENT
Start: 2022-02-28

## 2022-03-02 DIAGNOSIS — E11.9 CONTROLLED TYPE 2 DIABETES MELLITUS WITHOUT COMPLICATION, WITHOUT LONG-TERM CURRENT USE OF INSULIN (HCC): ICD-10-CM

## 2022-03-02 RX ORDER — GLIMEPIRIDE 1 MG/1
TABLET ORAL
Qty: 45 TABLET | Refills: 0 | OUTPATIENT
Start: 2022-03-02

## 2022-04-09 DIAGNOSIS — I10 ESSENTIAL HYPERTENSION: ICD-10-CM

## 2022-04-10 RX ORDER — LISINOPRIL 20 MG/1
TABLET ORAL
Qty: 90 TABLET | Refills: 3 | Status: SHIPPED | OUTPATIENT
Start: 2022-04-10

## 2022-06-23 DIAGNOSIS — E11.9 CONTROLLED TYPE 2 DIABETES MELLITUS WITHOUT COMPLICATION, WITHOUT LONG-TERM CURRENT USE OF INSULIN (HCC): ICD-10-CM

## 2022-06-23 RX ORDER — GLIMEPIRIDE 1 MG/1
TABLET ORAL
Qty: 45 TABLET | Refills: 0 | OUTPATIENT
Start: 2022-06-23

## 2022-06-23 NOTE — TELEPHONE ENCOUNTER
Please call her, we received a request to refill glimepiride  I have not seen her since March of 2021, please check if she is seeing a different provider, if not please set up an appointment with us  We could send in a prescription to carry her over in the meantime, let me know

## 2023-02-03 ENCOUNTER — RA CDI HCC (OUTPATIENT)
Dept: OTHER | Facility: HOSPITAL | Age: 88
End: 2023-02-03

## 2023-02-03 NOTE — PROGRESS NOTES
[address] [id number]  [facility]  coding opportunities          Chart Reviewed number of suggestions sent to Provider: 1     Patients Insurance     Medicare Insurance: [employer]

## 2023-02-08 ENCOUNTER — TELEPHONE (OUTPATIENT)
Dept: FAMILY MEDICINE CLINIC | Facility: CLINIC | Age: 88
End: 2023-02-08

## 2023-02-10 ENCOUNTER — OFFICE VISIT (OUTPATIENT)
Dept: FAMILY MEDICINE CLINIC | Facility: CLINIC | Age: 88
End: 2023-02-10

## 2023-02-10 VITALS
TEMPERATURE: 97.1 F | BODY MASS INDEX: 35.45 KG/M2 | HEART RATE: 76 BPM | DIASTOLIC BLOOD PRESSURE: 76 MMHG | OXYGEN SATURATION: 99 % | WEIGHT: 187.6 LBS | SYSTOLIC BLOOD PRESSURE: 132 MMHG

## 2023-02-10 DIAGNOSIS — I71.40 ABDOMINAL AORTIC ANEURYSM (AAA) WITHOUT RUPTURE, UNSPECIFIED PART: ICD-10-CM

## 2023-02-10 DIAGNOSIS — E11.42 TYPE 2 DIABETES MELLITUS WITH DIABETIC POLYNEUROPATHY, WITHOUT LONG-TERM CURRENT USE OF INSULIN (HCC): ICD-10-CM

## 2023-02-10 DIAGNOSIS — I48.21 PERMANENT ATRIAL FIBRILLATION (HCC): ICD-10-CM

## 2023-02-10 DIAGNOSIS — I50.42 CHRONIC COMBINED SYSTOLIC AND DIASTOLIC CONGESTIVE HEART FAILURE (HCC): Primary | ICD-10-CM

## 2023-02-10 DIAGNOSIS — R20.2 PARESTHESIA OF BOTH HANDS: ICD-10-CM

## 2023-02-10 DIAGNOSIS — N18.32 STAGE 3B CHRONIC KIDNEY DISEASE (HCC): ICD-10-CM

## 2023-02-10 DIAGNOSIS — I10 ESSENTIAL HYPERTENSION: ICD-10-CM

## 2023-02-10 DIAGNOSIS — N32.81 OVERACTIVE BLADDER: ICD-10-CM

## 2023-02-10 DIAGNOSIS — I25.10 CORONARY ARTERIOSCLEROSIS: ICD-10-CM

## 2023-02-10 DIAGNOSIS — S31.819A WOUND OF RIGHT BUTTOCK, INITIAL ENCOUNTER: ICD-10-CM

## 2023-02-10 DIAGNOSIS — Z79.01 CHRONIC ANTICOAGULATION: ICD-10-CM

## 2023-02-10 DIAGNOSIS — E78.5 HYPERLIPIDEMIA, UNSPECIFIED HYPERLIPIDEMIA TYPE: ICD-10-CM

## 2023-02-10 RX ORDER — FUROSEMIDE 20 MG/1
20 TABLET ORAL 2 TIMES DAILY
Qty: 180 TABLET | Refills: 1 | Status: SHIPPED | OUTPATIENT
Start: 2023-02-10

## 2023-02-10 RX ORDER — NYSTATIN 100000 U/G
CREAM TOPICAL 2 TIMES DAILY
Qty: 30 G | Refills: 1 | Status: SHIPPED | OUTPATIENT
Start: 2023-02-10

## 2023-02-10 RX ORDER — FUROSEMIDE 20 MG/1
TABLET ORAL
Qty: 270 TABLET | Refills: 3 | Status: CANCELLED | OUTPATIENT
Start: 2023-02-10

## 2023-02-10 NOTE — PATIENT INSTRUCTIONS
Reviewed health history along with medication, I had last seen her almost 2 years ago  Since last seen she has dropped 15 pounds, she has been trying to watch healthy diet, continue with same  She is no longer using glimepiride, I would like her to get updated blood work, slip given for fasting lipids, CBC, CMP, A1c along with microalbumin  She has been using furosemide only 20 mg twice daily, previously had been using total of 60 mg daily  For now she will continue with same, she has noted no increased shortness of breath, she will be seeing her cardiologist at [facility] again in March  Rate control here today, stay on metoprolol tartrate 100 mg twice daily, lisinopril 20 mg daily, Eliquis 5 mg twice daily as is  Await redo lipids, continue atorvastatin 80 mg daily along with Zetia 10 mg daily    Did have ultrasound aorta with CT abdomen/pelvis last June, small infrarenal aneurysm, just observe  Joleen Guallpa, Dr Damaris Luna    Does have superficial wound right buttocks with degree of candidiasis, she will use nystatin cream twice daily for 2 weeks, watch for worsening  We will see her again in 4 months with an annual wellness check, call sooner if needed    Await blood work

## 2023-02-10 NOTE — PROGRESS NOTES
FAMILY PRACTICE OFFICE VISIT  Nick LOPEZ O  [facility]  [address]  [address], [place], [facility]      NAME: Mercy Butt  AGE: 80 y o  SEX: female  : 1933   MRN: [id number]    DATE: 2/10/2023  TIME: 2:15 PM    Assessment and Plan     Problem List Items Addressed This Visit     Chronic anticoagulation    Overactive bladder    Relevant Orders    CBC    AAA (abdominal aortic aneurysm) without rupture    Chronic combined systolic and diastolic congestive heart failure (HCC) - Primary    Relevant Medications    furosemide (LASIX) 20 mg tablet    Other Relevant Orders    CBC    Comprehensive metabolic panel    Atrial fibrillation (HCC)    Relevant Orders    CBC    Comprehensive metabolic panel    Coronary arteriosclerosis    Relevant Orders    Lipid panel    CBC    Comprehensive metabolic panel    Essential hypertension    Relevant Medications    furosemide (LASIX) 20 mg tablet    Other Relevant Orders    Comprehensive metabolic panel    Hyperlipidemia    Relevant Orders    Lipid panel    Type 2 diabetes mellitus with diabetic polyneuropathy, without long-term current use of insulin (HCC)    Relevant Orders    Microalbumin / creatinine urine ratio    Lipid panel    Comprehensive metabolic panel    Hemoglobin A1C   Other Visit Diagnoses     Wound of right buttock, initial encounter        Relevant Medications    nystatin (MYCOSTATIN) cream    Paresthesia of both hands        Stage 3b chronic kidney disease (HCC)        Relevant Medications    furosemide (LASIX) 20 mg tablet          Patient Instructions   Reviewed health history along with medication, I had last seen her almost 2 years ago  Since last seen she has dropped 15 pounds, she has been trying to watch healthy diet, continue with same    She is no longer using glimepiride, I would like her to get updated blood work, slip given for fasting lipids, CBC, CMP, A1c along with microalbumin  She has been using furosemide only 20 mg twice daily, previously had been using total of 60 mg daily  For now she will continue with same, she has noted no increased shortness of breath, she will be seeing her cardiologist at [facility] again in March  Rate control here today, stay on metoprolol tartrate 100 mg twice daily, lisinopril 20 mg daily, Eliquis 5 mg twice daily as is  Await redo lipids, continue atorvastatin 80 mg daily along with Zetia 10 mg daily    Did have ultrasound aorta with CT abdomen/pelvis last June, small infrarenal aneurysm, just observe  Donnie Nunez, Dr Homa Hopson    Does have superficial wound right buttocks with degree of candidiasis, she will use nystatin cream twice daily for 2 weeks, watch for worsening  We will see her again in 4 months with an annual wellness check, call sooner if needed  Await blood work      Chief Complaint     Chief Complaint   Patient presents with   • Follow-up       History of Present Illness   Baljinder Lugo is a 80y o -year-old female who I have not seen in almost 2 years  She relates she did see her cardiologist 1 year ago, has been using her Lasix 20 mg twice daily rather than 40 in the morning, 20 in the evening, she had noted increased urinary incontinence with frequency  She has lost about 15 pounds since I saw her in March 2021  She relates she does try to watch a healthy diet  She has no increased shortness of breath versus baseline, does have some chronic swelling of legs, uses compression socks at times  Does continue with anticoagulation on Eliquis, has had no bleeding  She has been off her glimepiride    Has had superficial wound on buttocks for the past 2 months      Review of Systems   Review of Systems   Constitutional: Positive for fatigue and unexpected weight change  Negative for appetite change and fever  HENT: Negative for sore throat and trouble swallowing  Respiratory: Positive for shortness of breath  Negative for cough, chest tightness and wheezing  Cardiovascular: Positive for leg swelling  Negative for chest pain and palpitations  Gastrointestinal: Negative for abdominal pain, blood in stool, nausea and vomiting  No acid reflux     No change in bowel   Genitourinary: Positive for frequency  Negative for dysuria and hematuria  Neurological: Negative for dizziness, syncope, light-headedness and headaches  Hematological: Bruises/bleeds easily (Denies bleeding)  Psychiatric/Behavioral: Negative for behavioral problems and confusion  Active Problem List     Patient Active Problem List   Diagnosis   • Atrial fibrillation (HCC)   • Bilateral impacted cerumen   • Bladder incontinence   • Carotid artery stenosis   • Type 2 diabetes mellitus with diabetic polyneuropathy, without long-term current use of insulin (HCC)   • Coronary arteriosclerosis   • Esophageal reflux   • Hyperlipidemia   • Essential hypertension   • Lipoma   • OA (osteoarthritis)   • Overactive bladder   • Right lumbar radiculopathy   • Seborrheic dermatitis   • Tricuspid valve disorders, non-rheumatic   • Chronic combined systolic and diastolic congestive heart failure (HCC)   • Obesity   • Sinus bradycardia   • Spinal stenosis of lumbar region   • H/O scarlet fever   • Mitral annular calcification   • Chronic anticoagulation   • AAA (abdominal aortic aneurysm) without rupture       Past Medical History:  Reviewed    Past Surgical History:  Reviewed    Family History:  Reviewed    Social History:  Reviewed    Objective     Vitals:    02/10/23 1050   BP: 132/76   BP Location: Left arm   Patient Position: Sitting   Cuff Size: Large   Pulse: 76   Temp: (!) 97 1 °F (36 2 °C)   TempSrc: Tympanic   SpO2: 99%   Weight: 85 1 kg (187 lb 9 6 oz)     Body mass index is 35 45 kg/m²      BP Readings from Last 3 Encounters:   02/10/23 132/76   03/30/21 118/84   09/21/20 132/80       Wt Readings from Last 3 Encounters:   02/10/23 85 1 kg (187 lb 9 6 oz)   03/30/21 91 6 kg (202 lb)   09/21/20 94 3 kg (208 lb)       Physical Exam  Constitutional:       General: She is not in acute distress  Appearance: Normal appearance  She is well-developed  She is not ill-appearing  Eyes:      General: No scleral icterus  Cardiovascular:      Comments: Irregularly irregular at a controlled rate  Pulmonary:      Effort: Pulmonary effort is normal  No respiratory distress  Breath sounds: Normal breath sounds  No wheezing, rhonchi or rales  Abdominal:      Palpations: Abdomen is soft  Tenderness: There is no abdominal tenderness  Musculoskeletal:      Comments: Trace pitting at ankles, no open wounds   Lymphadenopathy:      Cervical: No cervical adenopathy  Skin:     Coloration: Skin is not jaundiced  Neurological:      Mental Status: She is alert  Mental status is at baseline     Psychiatric:         Mood and Affect: Mood normal          Behavior: Behavior normal          ALLERGIES:  Allergies   Allergen Reactions   • Celecoxib      Reaction Date: [id number];    • Latex    • Adhesive [Medical Tape] Rash   • Iodinated Contrast Media Rash   • Sulfa Antibiotics Rash       Current Medications     Current Outpatient Medications   Medication Sig Dispense Refill   • apixaban (ELIQUIS) 5 mg Take 5 mg by mouth 2 (two) times a day     • atorvastatin (LIPITOR) 80 mg tablet Take 1 tablet by mouth     • Cholecalciferol (VITAMIN D) 2000 units CAPS Take by mouth daily      • cycloSPORINE (RESTASIS) 0 05 % ophthalmic emulsion Apply 1 drop to eye Medrol Dose Pack scheduling ONLY       • ezetimibe (ZETIA) 10 mg tablet Take 10 mg by mouth daily       • furosemide (LASIX) 20 mg tablet Take 1 tablet (20 mg total) by mouth 2 (two) times a day ( or as directed) 180 tablet 1   • lisinopril (ZESTRIL) 20 mg tablet TAKE 1 TABLET DAILY 90 tablet 3   • metoprolol tartrate (LOPRESSOR) 100 mg tablet TAKE 1 TABLET EVERY 12     HOURS 180 tablet 3   • Multiple Vitamins-Minerals (HAIR SKIN AND NAILS FORMULA) TABS Take by mouth     • nystatin (MYCOSTATIN) cream Apply topically 2 (two) times a day ( to red, irritated area buttocks for 2 weeks ) 30 g 1   • penicillin V potassium (VEETID) 500 mg tablet as needed For dental procedures  • glucose blood (TRUETRACK TEST) test strip Use as instructed 100 each 3   • nitroglycerin (Nitrostat) 0 4 mg SL tablet Place 1 tablet (0 4 mg total) under the tongue as needed for chest pain 25 tablet 0     No current facility-administered medications for this visit              Orders Placed This Encounter   Procedures   • Microalbumin / creatinine urine ratio   • Lipid panel   • CBC   • Comprehensive metabolic panel   • Hemoglobin A1C         Ilda Kirby DO

## 2023-02-21 DIAGNOSIS — I48.21 PERMANENT ATRIAL FIBRILLATION (HCC): ICD-10-CM

## 2023-02-21 DIAGNOSIS — I10 ESSENTIAL HYPERTENSION: ICD-10-CM

## 2023-02-21 DIAGNOSIS — I25.10 CORONARY ARTERIOSCLEROSIS: ICD-10-CM

## 2023-02-21 RX ORDER — METOPROLOL TARTRATE 100 MG/1
100 TABLET ORAL EVERY 12 HOURS
Qty: 180 TABLET | Refills: 3 | Status: SHIPPED | OUTPATIENT
Start: 2023-02-21

## 2023-03-30 ENCOUNTER — TELEPHONE (OUTPATIENT)
Dept: FAMILY MEDICINE CLINIC | Facility: CLINIC | Age: 88
End: 2023-03-30

## 2023-03-30 ENCOUNTER — OFFICE VISIT (OUTPATIENT)
Dept: FAMILY MEDICINE CLINIC | Facility: CLINIC | Age: 88
End: 2023-03-30

## 2023-03-30 VITALS
DIASTOLIC BLOOD PRESSURE: 74 MMHG | BODY MASS INDEX: 34.62 KG/M2 | SYSTOLIC BLOOD PRESSURE: 130 MMHG | HEART RATE: 70 BPM | WEIGHT: 183.2 LBS | OXYGEN SATURATION: 97 % | TEMPERATURE: 97.4 F

## 2023-03-30 DIAGNOSIS — N30.00 ACUTE CYSTITIS WITHOUT HEMATURIA: Primary | ICD-10-CM

## 2023-03-30 DIAGNOSIS — I10 ESSENTIAL HYPERTENSION: ICD-10-CM

## 2023-03-30 DIAGNOSIS — E11.42 TYPE 2 DIABETES MELLITUS WITH DIABETIC POLYNEUROPATHY, WITHOUT LONG-TERM CURRENT USE OF INSULIN (HCC): ICD-10-CM

## 2023-03-30 DIAGNOSIS — I48.21 PERMANENT ATRIAL FIBRILLATION (HCC): ICD-10-CM

## 2023-03-30 RX ORDER — CEFADROXIL 500 MG/1
500 CAPSULE ORAL EVERY 12 HOURS SCHEDULED
Qty: 14 CAPSULE | Refills: 0 | Status: SHIPPED | OUTPATIENT
Start: 2023-03-30 | End: 2023-04-06

## 2023-03-30 RX ORDER — CEFADROXIL 500 MG/1
500 CAPSULE ORAL 2 TIMES DAILY
COMMUNITY
Start: 2023-03-28 | End: 2023-04-07

## 2023-03-30 NOTE — TELEPHONE ENCOUNTER
Please call her back, I reviewed her emergency room visit from March [facility] at [facility], she had blood work, urine testing, CT scanning  She did receive ceftriaxone antibiotic at emergency room, she also received an oral antibiotic( Cefadroxil/ Duricef )  to use 500 mg twice daily for 10 days    Please set her up for a follow-up with 1 of us

## 2023-03-30 NOTE — TELEPHONE ENCOUNTER
I called the pt and left a detailed vm so she can call our office and schedule an appt  I will contact her again tomorrow if she does not call back by the end of today

## 2023-03-30 NOTE — PROGRESS NOTES
FAMILY PRACTICE OFFICE VISIT    NAME: Jose Cruz Dunn    AGE: 80 y o  SEX: female  : 1933   MRN: [id number]    DATE: 3/30/2023  TIME: 3:43 PM    TCM Call     None      TCM Call     None          Assessment and Plan   1  Acute cystitis without hematuria  See below re: antibiotic therapy  Will have pt start duricef 500 mg po bid for 7 days  Normal GFR  Repeat urine in 2 weeks  Patient to call for results if he/she does not hear from us      2  Type 2 diabetes mellitus with diabetic polyneuropathy, without long-term current use of insulin (HCC)  followup with pcp    3  Permanent atrial fibrillation (HCC)  Rate controlled and on chronic anti-coag  Sees cardio    4  Essential hypertension  Stable  Pt with some edema  But no signs of active CHF  Troponin (-) in ED    Reviewed pt's most recent creatinine - 0 89  With GFR 62    Reviewed labs done in hospital  Urine reviewed but no culture run  Pt reports that she has not been taking antibiotic post-d/c from [address] [place] [address] office to call pharmacy to clarify    Patient Instructions   Begin taking duricef 2x/day   Eat a yogurt or take a probiotic once daily while taking    Repeat a urine in 2 weeks  Patient to call for results if he/she does not hear from us            Chief Complaint     Chief Complaint   Patient presents with   • Follow-up     Acute pyelonephritis, [facility]        History of Present Illness   Jose Cruz Dunn is a 80y o -year-old female who presents today with her son for TCM    Pt developed left flank pain   She was not sure if it was her heart - as pain was on left side  Was coming and going  Took a SLN at home  And then went to hospital due to ongoing symptoms  Was dx with UTI  And was given IV antibiotic  And d/c home same day  Pt states that she did not take antibiotic upon discharge as she states it was on backorder          Review of Systems   Review of Systems   Constitutional: Negative for fever          Lost a couple of #     Respiratory: Positive for shortness of breath  Negative for cough and wheezing          (+) SOB with exertion  Unchanged     Cardiovascular: Negative for chest pain and palpitations  Gastrointestinal: Negative for abdominal pain, diarrhea, nausea and vomiting  Had 2 days of diarrhea last week - nothing since  appetitie is good  Endocrine:        Pt no longer taking glyburide  Is not checking sugars  Genitourinary: Negative for dysuria, frequency and hematuria          Drinking water  No h/o calculi       Active Problem List     Patient Active Problem List   Diagnosis   • Atrial fibrillation ([email or web address] 75 )   • Bilateral impacted cerumen   • Bladder incontinence   • Carotid artery stenosis   • Type 2 diabetes mellitus with diabetic polyneuropathy, without long-term current use of insulin ([facility])   • Coronary arteriosclerosis   • Esophageal reflux   • Hyperlipidemia   • Essential hypertension   • Lipoma   • OA (osteoarthritis)   • Overactive bladder   • Right lumbar radiculopathy   • Seborrheic dermatitis   • Tricuspid valve disorders, non-rheumatic   • Chronic combined systolic and diastolic congestive heart failure ([facility])   • Obesity   • Sinus bradycardia   • Spinal stenosis of lumbar region   • H/O scarlet fever   • Mitral annular calcification   • Chronic anticoagulation   • AAA (abdominal aortic aneurysm) without rupture         Past Medical History:  Past Medical History:   Diagnosis Date   • Atrial fibrillation with rapid ventricular response ([facility] [[email or web address] 75 )     RESOLVED: [id number]   • Lumbar compression fracture ([facility] [[email or web address] 75 )     RESOLVED: [id number]   • 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; SURGERY 1934   • Nasal fracture     RESOLVED: [id number]       Past Surgical History:  Past Surgical History:   Procedure Laterality Date   • CATARACT EXTRACTION     • COLONOSCOPY      ONSET: 1998   • HYSTERECTOMY     • REPLACEMENT TOTAL KNEE BILATERAL      ONSET: NOV 2011   • SCALP EXCISION  1934    LESION;    • TONSILLECTOMY     • TRANSLUMINAL ANGIOPLASTY INTRAOPERATIVE        Family History:  Family History   Problem Relation Age of Onset   • Eclampsia Mother    • Lung cancer Father    • Diabetes Son    • Breast cancer Family        Social History:  Social History     Socioeconomic History   • Marital status: Single     Spouse name: Not on file   • Number of children: Not on file   • Years of education: Not on file   • Highest education level: Not on file   Occupational History   • Not on file   Tobacco Use   • Smoking status: Never   • Smokeless tobacco: Never   Substance and Sexual Activity   • Alcohol use: No   • Drug use: No   • Sexual activity: Not Currently   Other Topics Concern   • Not on file   Social History Narrative   • Not on file     Social Determinants of Health     Financial Resource Strain: Not on file   Food Insecurity: Not on file   Transportation Needs: Not on file   Physical Activity: Not on file   Stress: Not on file   Social Connections: Not on file   Intimate Partner Violence: Not on file   Housing Stability: Not on file       Objective     Vitals:    03/30/23 1533   BP: 130/74   Pulse: 70   Temp: (!) 97 4 °F (36 3 °C)   SpO2: 97%     Wt Readings from Last 3 Encounters:   03/30/23 83 1 kg (183 lb 3 2 oz)   02/10/23 85 1 kg (187 lb 9 6 oz)   03/30/21 91 6 kg (202 lb)       Physical Exam  Vitals and nursing note reviewed  Constitutional:       General: She is not in acute distress  Appearance: Normal appearance  She is not ill-appearing or toxic-appearing  Comments: Good facial coloring     HENT:      Ears:      Comments: Very hard of hearing       Mouth/Throat:      Mouth: Mucous membranes are moist       Pharynx: No oropharyngeal exudate or posterior oropharyngeal erythema  Comments: Mucous membranes moist and pink      Eyes:      General: No scleral icterus  Left eye: No discharge  Cardiovascular:      Rate and Rhythm: Normal rate  Pulses: Normal pulses  Heart sounds: Normal heart sounds   No murmur heard      Comments: Irregularly irregular but rate controlled  Pulmonary:      Effort: Pulmonary effort is normal  No respiratory distress  Breath sounds: Normal breath sounds  No wheezing, rhonchi or rales  Comments: No use of accessory respiratory muscles    Abdominal:      General: There is no distension  Palpations: Abdomen is soft  There is no mass  Tenderness: There is no abdominal tenderness  There is no right CVA tenderness, left CVA tenderness, guarding or rebound  Comments: No suprapubic tenderness     Musculoskeletal:         General: No tenderness  Right lower leg: Edema present  Left lower leg: Edema present  Comments: B/l lower ext edema  Slightly pitting  nontender  Pt reports that swelling is down somewhat     Skin:     General: Skin is warm  Coloration: Skin is not jaundiced  Neurological:      Mental Status: She is alert  Psychiatric:         Mood and Affect: Mood normal          Behavior: Behavior normal          Thought Content:  Thought content normal          Judgment: Judgment normal          Pertinent Laboratory/Diagnostic Studies:  Lab Results   Component Value Date    GLUCOSE 78 12/01/2015    BUN 25 03/30/2021    CREATININE 1 13 03/30/2021    CALCIUM 9 6 03/30/2021     12/01/2015    K 4 2 03/30/2021    CO2 27 03/30/2021     03/30/2021     Lab Results   Component Value Date    ALT 19 03/30/2021    AST 23 03/30/2021    ALKPHOS 110 03/30/2021    BILITOT 0 74 12/01/2015       Lab Results   Component Value Date    WBC 6 99 03/30/2021    HGB 13 6 03/30/2021    HCT 41 3 03/30/2021    MCV 99 (H) 03/30/2021     03/30/2021       No results found for: TSH    Lab Results   Component Value Date    CHOL 186 12/01/2015     Lab Results   Component Value Date    TRIG 117 09/21/2020     Lab Results   Component Value Date    HDL 63 09/21/2020     Lab Results   Component Value Date    LDLCALC 98 09/21/2020     Lab Results   Component Value Date    HGBA1C 8 4 (A) 03/30/2021       Results for orders placed or performed in visit on 03/30/21   Comprehensive metabolic panel   Result Value Ref Range    Sodium 138 136 - 145 mmol/L    Potassium 4 2 3 5 - 5 3 mmol/L    Chloride 107 100 - 108 mmol/L    CO2 27 21 - 32 mmol/L    ANION GAP 4 4 - 13 mmol/L    BUN 25 5 - 25 mg/dL    Creatinine 1 13 0 60 - 1 30 mg/dL    Glucose, Fasting 172 (H) 65 - 99 mg/dL    Calcium 9 6 8 3 - 10 1 mg/dL    AST 23 5 - 45 U/L    ALT 19 12 - 78 U/L    Alkaline Phosphatase 110 46 - 116 U/L    Total Protein 7 4 6 4 - 8 2 g/dL    Albumin 3 8 3 5 - 5 0 g/dL    Total Bilirubin 1 45 (H) 0 20 - 1 00 mg/dL    eGFR 44 ml/min/1 73sq m   CBC   Result Value Ref Range    WBC 6 99 4 31 - 10 16 Thousand/uL    RBC 4 18 3 81 - 5 12 Million/uL    Hemoglobin 13 6 11 5 - 15 4 g/dL    Hematocrit 41 3 34 8 - 46 1 %    MCV 99 (H) 82 - 98 fL    MCH 32 5 26 8 - 34 3 pg    MCHC 32 9 31 4 - 37 4 g/dL    RDW 13 2 11 6 - 15 1 %    Platelets 623 934 - 622 Thousands/uL    MPV 9 8 8 9 - 12 7 fL   POCT hemoglobin A1c   Result Value Ref Range    Hemoglobin A1C 8 4 (A) 6 5       No orders of the defined types were placed in this encounter        ALLERGIES:  Allergies   Allergen Reactions   • Celecoxib      Reaction Date: [id number];    • Latex    • Adhesive [Medical Tape] Rash   • Iodinated Contrast Media Rash   • Sulfa Antibiotics Rash       Current Medications     Current Outpatient Medications   Medication Sig Dispense Refill   • apixaban (ELIQUIS) 5 mg Take 5 mg by mouth 2 (two) times a day     • atorvastatin (LIPITOR) 80 mg tablet Take 1 tablet by mouth     • cefadroxil (DURICEF) 500 mg capsule Take 500 mg by mouth 2 (two) times a day     • Cholecalciferol (VITAMIN D) 2000 units CAPS Take by mouth daily      • cycloSPORINE (RESTASIS) 0 05 % ophthalmic emulsion Apply 1 drop to eye Medrol Dose Pack scheduling ONLY       • ezetimibe (ZETIA) 10 mg tablet Take 10 mg by mouth daily       • furosemide (LASIX) 20 mg tablet Take 1 tablet (20 mg total) by mouth 2 (two) times a day ( or as directed) 180 tablet 1   • glucose blood (TRUETRACK TEST) test strip Use as instructed 100 each 3   • lisinopril (ZESTRIL) 20 mg tablet TAKE 1 TABLET DAILY 90 tablet 3   • metoprolol tartrate (LOPRESSOR) 100 mg tablet Take 1 tablet (100 mg total) by mouth every 12 (twelve) hours 180 tablet 3   • Multiple Vitamins-Minerals (HAIR SKIN AND NAILS FORMULA) TABS Take by mouth     • nitroglycerin (Nitrostat) 0 4 mg SL tablet Place 1 tablet (0 4 mg total) under the tongue as needed for chest pain 25 tablet 0   • nystatin (MYCOSTATIN) cream Apply topically 2 (two) times a day ( to red, irritated area buttocks for 2 weeks ) 30 g 1   • penicillin V potassium (VEETID) 500 mg tablet as needed For dental procedures  No current facility-administered medications for this visit           Health Maintenance     Health Maintenance   Topic Date Due   • Kidney Health Evaluation: Microalbumin/Creatinine Ratio  Never done   • Diabetic Foot Exam  07/27/2021   • Fall Risk  09/21/2021   • Urinary Incontinence Screening  09/21/2021   • Medicare Annual Wellness Visit (AWV)  09/21/2021   • DM Eye Exam  09/23/2021   • HEMOGLOBIN A1C  09/30/2021   • COVID-19 Vaccine (4 - Booster for Moderna series) 01/12/2022   • Depression Screening  03/30/2022   • Influenza Vaccine (1) 09/01/2022   • BMI: Adult  02/10/2024   • Kidney Health Evaluation: GFR  03/28/2024   • Pneumococcal Vaccine: 65+ Years  Completed   • HIB Vaccine  Aged Out   • IPV Vaccine  Aged Out   • Hepatitis A Vaccine  Aged Out   • Meningococcal ACWY Vaccine  Aged Out   • HPV Vaccine  Aged Out     Immunization History   Administered Date(s) Administered   • COVID-19 MODERNA VACC 0 5 ML IM 01/20/2021, 02/17/2021, 11/17/2021   • INFLUENZA 10/15/2015, 11/01/2017   • Influenza Split High Dose Preservative Free IM 09/23/2014, 10/24/2015, 09/27/2016   • Influenza, high dose seasonal 0 7 mL 11/13/2018, 11/07/2019, 09/21/2020   • Influenza, seasonal, injectable 01/21/2009, 10/01/2011, 10/01/2012   • Pneumococcal 10/18/2015   • Pneumococcal Conjugate 13-Valent 03/12/2015   • Pneumococcal Polysaccharide PPV23 07/22/2003   • Zoster 03/01/2011          Palomo Pizano DO

## 2023-04-30 PROBLEM — I71.43 INFRARENAL ABDOMINAL AORTIC ANEURYSM (AAA) WITHOUT RUPTURE (HCC): Status: ACTIVE | Noted: 2021-07-19

## 2023-05-01 ENCOUNTER — OFFICE VISIT (OUTPATIENT)
Dept: FAMILY MEDICINE CLINIC | Facility: CLINIC | Age: 88
End: 2023-05-01

## 2023-05-01 ENCOUNTER — TELEPHONE (OUTPATIENT)
Dept: ADMINISTRATIVE | Facility: OTHER | Age: 88
End: 2023-05-01

## 2023-05-01 VITALS
WEIGHT: 180 LBS | HEIGHT: 63 IN | HEART RATE: 90 BPM | DIASTOLIC BLOOD PRESSURE: 74 MMHG | BODY MASS INDEX: 31.89 KG/M2 | SYSTOLIC BLOOD PRESSURE: 126 MMHG | TEMPERATURE: 96.6 F | OXYGEN SATURATION: 97 %

## 2023-05-01 DIAGNOSIS — I50.42 CHRONIC COMBINED SYSTOLIC AND DIASTOLIC CONGESTIVE HEART FAILURE (HCC): ICD-10-CM

## 2023-05-01 DIAGNOSIS — I10 ESSENTIAL HYPERTENSION: ICD-10-CM

## 2023-05-01 DIAGNOSIS — K21.9 GASTROESOPHAGEAL REFLUX DISEASE WITHOUT ESOPHAGITIS: ICD-10-CM

## 2023-05-01 DIAGNOSIS — Z00.00 MEDICARE ANNUAL WELLNESS VISIT, SUBSEQUENT: Primary | ICD-10-CM

## 2023-05-01 DIAGNOSIS — Z79.01 CHRONIC ANTICOAGULATION: ICD-10-CM

## 2023-05-01 DIAGNOSIS — I48.21 PERMANENT ATRIAL FIBRILLATION (HCC): ICD-10-CM

## 2023-05-01 DIAGNOSIS — E66.09 CLASS 1 OBESITY DUE TO EXCESS CALORIES WITH SERIOUS COMORBIDITY AND BODY MASS INDEX (BMI) OF 34.0 TO 34.9 IN ADULT: ICD-10-CM

## 2023-05-01 DIAGNOSIS — M19.90 OSTEOARTHRITIS, UNSPECIFIED OSTEOARTHRITIS TYPE, UNSPECIFIED SITE: ICD-10-CM

## 2023-05-01 DIAGNOSIS — I71.43 INFRARENAL ABDOMINAL AORTIC ANEURYSM (AAA) WITHOUT RUPTURE (HCC): ICD-10-CM

## 2023-05-01 DIAGNOSIS — I25.10 CORONARY ARTERIOSCLEROSIS: ICD-10-CM

## 2023-05-01 DIAGNOSIS — N39.0 E. COLI UTI (URINARY TRACT INFECTION): ICD-10-CM

## 2023-05-01 DIAGNOSIS — E11.42 TYPE 2 DIABETES MELLITUS WITH DIABETIC POLYNEUROPATHY, WITHOUT LONG-TERM CURRENT USE OF INSULIN (HCC): ICD-10-CM

## 2023-05-01 DIAGNOSIS — B96.20 E. COLI UTI (URINARY TRACT INFECTION): ICD-10-CM

## 2023-05-01 DIAGNOSIS — R32 URINARY INCONTINENCE, UNSPECIFIED TYPE: ICD-10-CM

## 2023-05-01 NOTE — PROGRESS NOTES
Diabetic Foot Exam / AWV    Patient's shoes and socks removed  Right Foot/Ankle   Right Foot Inspection  Skin Exam: skin normal, skin intact, dry skin, callus and callus  No warmth, no erythema, no maceration, no abnormal color, no pre-ulcer and no ulcer  Sensory   Monofilament testing: diminished    Vascular  The right DP pulse is 1+  The right PT pulse is 1+  Right Toe  - Comprehensive Exam  Ecchymosis: none  Arch: normal  Hammertoes: second toe  Claw Toes: fifth toe  Swelling: none   Tenderness: none         Left Foot/Ankle  Left Foot Inspection  Skin Exam: skin normal, skin intact, dry skin and callus  No warmth, no erythema, no maceration, normal color, no pre-ulcer and no ulcer  Sensory   Monofilament testing: diminished    Vascular  The left DP pulse is 1+  The left PT pulse is 1+       Left Toe  - Comprehensive Exam  Ecchymosis: none  Arch: normal  Hammertoes: second toe  Claw toes: fifth toe  Swelling: none   Tenderness: none           Assign Risk Category  No deformity present  Loss of protective sensation  Weak pulses  Risk: 2         Assessment and Plan:     Problem List Items Addressed This Visit     Chronic anticoagulation    Bladder incontinence    OA (osteoarthritis)    Esophageal reflux    Infrarenal abdominal aortic aneurysm (AAA) without rupture (HCC)    Chronic combined systolic and diastolic congestive heart failure (HCC)    Relevant Orders    Basic metabolic panel    Microalbumin / creatinine urine ratio    TSH, 3rd generation with Free T4 reflex    Atrial fibrillation (HCC)    Relevant Orders    TSH, 3rd generation with Free T4 reflex    Coronary arteriosclerosis    Relevant Orders    Lipid panel    Essential hypertension    Relevant Orders    Basic metabolic panel    Obesity    Type 2 diabetes mellitus with diabetic polyneuropathy, without long-term current use of insulin (HCC)    Relevant Orders    Lipid panel    Basic metabolic panel    Microalbumin / creatinine urine ratio TSH, 3rd generation with Free T4 reflex    Hemoglobin A1C   Other Visit Diagnoses     Medicare annual wellness visit, subsequent    -  Primary    E  coli UTI (urinary tract infection 1 month ago )               Preventive health issues were discussed with patient, and age appropriate screening tests were ordered as noted in patient's After Visit Summary  Personalized health advice and appropriate referrals for health education or preventive services given if needed, as noted in patient's After Visit Summary      See other note today regarding provider information       History of Present Illness:     Patient presents for a Medicare Wellness Visit      Patient Care Team:  Holden Mckeon DO as PCP - Alex Guardado MD (Podiatry)       Problem List:     Patient Active Problem List   Diagnosis    Atrial fibrillation [facility])    Bilateral impacted cerumen    Bladder incontinence    Carotid artery stenosis    Type 2 diabetes mellitus with diabetic polyneuropathy, without long-term current use of insulin ([facility] [address] )    Coronary arteriosclerosis    Esophageal reflux    Hyperlipidemia    Essential hypertension    Lipoma    OA (osteoarthritis)    Overactive bladder    Right lumbar radiculopathy    Seborrheic dermatitis    Tricuspid valve disorders, non-rheumatic    Chronic combined systolic and diastolic congestive heart failure (HCC)    Obesity    Sinus bradycardia    Spinal stenosis of lumbar region    H/O scarlet fever    Mitral annular calcification    Chronic anticoagulation    Infrarenal abdominal aortic aneurysm (AAA) without rupture [facility])      Past Medical and Surgical History:     Past Medical History:   Diagnosis Date    Atrial fibrillation with rapid ventricular response ([address] )     RESOLVED: [id number]    Lumbar compression fracture ([address] )     RESOLVED: [id number]    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; SURGERY 1934    Nasal fracture     RESOLVED: [id number]     Past Surgical History: Procedure Laterality Date    CATARACT EXTRACTION      COLONOSCOPY      ONSET: 1998    HYSTERECTOMY      REPLACEMENT TOTAL KNEE BILATERAL      ONSET: NOV 2011    SCALP EXCISION  1934    LESION;     TONSILLECTOMY      TRANSLUMINAL ANGIOPLASTY      INTRAOPERATIVE       Family History:     Family History   Problem Relation Age of Onset    Eclampsia Mother     Lung cancer Father     Diabetes Son     Breast cancer Family       Social History:     Social History     Socioeconomic History    Marital status: Single     Spouse name: None    Number of children: None    Years of education: None    Highest education level: None   Occupational History    None   Tobacco Use    Smoking status: Never    Smokeless tobacco: Never   Substance and Sexual Activity    Alcohol use: No    Drug use: No    Sexual activity: Not Currently   Other Topics Concern    None   Social History Narrative    None     Social Determinants of Health     Financial Resource Strain: Not on file   Food Insecurity: Not on file   Transportation Needs: Not on file   Physical Activity: Not on file   Stress: Not on file   Social Connections: Not on file   Intimate Partner Violence: Not on file   Housing Stability: Not on file      Medications and Allergies:     Current Outpatient Medications   Medication Sig Dispense Refill    apixaban (ELIQUIS) 5 mg Take 5 mg by mouth 2 (two) times a day      atorvastatin (LIPITOR) 80 mg tablet Take 1 tablet by mouth      cycloSPORINE (RESTASIS) 0 05 % ophthalmic emulsion Apply 1 drop to eye Medrol Dose Pack scheduling ONLY        ezetimibe (ZETIA) 10 mg tablet Take 1 tablet (10 mg total) by mouth daily 90 tablet 3    furosemide (LASIX) 20 mg tablet Take 1 tablet (20 mg total) by mouth 2 (two) times a day ( or as directed) 180 tablet 1    glucose blood (TRUETRACK TEST) test strip Use as instructed 100 each 3    lisinopril (ZESTRIL) 20 mg tablet TAKE 1 TABLET DAILY 90 tablet 3    metoprolol tartrate (LOPRESSOR) 100 mg tablet Take 1 tablet (100 mg total) by mouth every 12 (twelve) hours 180 tablet 3    Multiple Vitamins-Minerals (HAIR SKIN AND NAILS FORMULA) TABS Take by mouth      nitroglycerin (Nitrostat) 0 4 mg SL tablet Place 1 tablet (0 4 mg total) under the tongue as needed for chest pain 25 tablet 0    Cholecalciferol (VITAMIN D) 2000 units CAPS Take by mouth daily  (Patient not taking: Reported on 5/1/2023)      nystatin (MYCOSTATIN) cream Apply topically 2 (two) times a day ( to red, irritated area buttocks for 2 weeks ) (Patient not taking: Reported on 5/1/2023) 30 g 1    penicillin V potassium (VEETID) 500 mg tablet as needed For dental procedures  (Patient not taking: Reported on 5/1/2023)       No current facility-administered medications for this visit  Allergies   Allergen Reactions    Celecoxib      Reaction Date: [id number];     Latex     Adhesive [Medical Tape] Rash    Iodinated Contrast Media Rash    Sulfa Antibiotics Rash      Immunizations:     Immunization History   Administered Date(s) Administered    COVID-19 MODERNA VACC 0 5 ML IM 01/20/2021, 02/17/2021, 11/17/2021    INFLUENZA 10/15/2015, 11/01/2017    Influenza Split High Dose Preservative Free IM 09/23/2014, 10/24/2015, 09/27/2016    Influenza, high dose seasonal 0 7 mL 11/13/2018, 11/07/2019, 09/21/2020    Influenza, seasonal, injectable 01/21/2009, 10/01/2011, 10/01/2012    Pneumococcal 10/18/2015    Pneumococcal Conjugate 13-Valent 03/12/2015    Pneumococcal Polysaccharide PPV23 07/22/2003    Zoster 03/01/2011      Health Maintenance: There are no preventive care reminders to display for this patient  Topic Date Due    COVID-19 Vaccine (4 - Booster for Moderna series) 01/12/2022      Medicare Screening Tests and Risk Assessments:     Dori Rodríguez is here for her Subsequent Wellness visit  Health Risk Assessment:   Patient rates overall health as fair   Patient feels that their physical health rating is slightly worse  Patient is satisfied with their life  Eyesight was rated as slightly worse  Hearing was rated as slightly worse  Patient feels that their emotional and mental health rating is same  Patients states they are never, rarely angry  Patient states they are sometimes unusually tired/fatigued  Pain experienced in the last 7 days has been some  Patient's pain rating has been 6/10  Depression Screening:   PHQ-2 Score: 2      Fall Risk Screening: In the past year, patient has experienced: history of falling in past year    Feels unsteady when standing or walking?: Yes    Worried about falling?: Yes      Urinary Incontinence Screening:   Patient has leaked urine accidently in the last six months  Home Safety:  Patient has trouble with stairs inside or outside of their home  Patient has working smoke alarms and has no working carbon monoxide detector  Home safety hazards include: none  Nutrition:   Current diet is Low Cholesterol, Low Carb and No Added Salt  Mediterranean diet    Medications:   Patient is currently taking over-the-counter supplements  OTC medications include: see medication list  Patient is able to manage medications  Activities of Daily Living (ADLs)/Instrumental Activities of Daily Living (IADLs):   Walk and transfer into and out of bed and chair?: Yes  Dress and groom yourself?: Yes    Bathe or shower yourself?: Yes    Feed yourself? Yes  Do your laundry/housekeeping?: Yes  Manage your money, pay your bills and track your expenses?: Yes  Make your own meals?: Yes    Do your own shopping?: No    Previous Hospitalizations:   Any hospitalizations or ED visits within the last 12 months?: Yes      Advance Care Planning:   Living will: Yes    Durable POA for healthcare:  Yes    Advanced directive: Yes      PREVENTIVE SCREENINGS      Cardiovascular Screening:    General: Screening Not Indicated and History Lipid Disorder      Diabetes Screening:     General: Screening Not Indicated and History Diabetes      Colorectal Cancer Screening:     General: Screening Not Indicated      Cervical Cancer Screening:    General: Screening Not Indicated      Abdominal Aortic Aneurysm (AAA) Screening:        General: Screening Not Indicated and History AAA      Lung Cancer Screening:     General: Screening Not Indicated    Screening, Brief Intervention, and Referral to Treatment (SBIRT)    Screening  Typical number of drinks in a day: 0  Typical number of drinks in a week: 0  Interpretation: Low risk drinking behavior      Single Item Drug Screening:  How often have you used an illegal drug (including marijuana) or a prescription medication for non-medical reasons in the past year? never    Single Item Drug Screen Score: 0  Interpretation: Negative screen for possible drug use disorder        Marisol Fernandez, DO

## 2023-05-01 NOTE — TELEPHONE ENCOUNTER
Upon review of the In Basket request we were able to locate, review, and update the patient chart as requested for Diabetic Eye Exam     Any additional questions or concerns should be emailed to the Practice Liaisons via the appropriate education email address, please do not reply via In Basket      Thank you  Dread Bruner MA

## 2023-05-01 NOTE — LETTER
Diabetic Eye Exam Form    Date Requested: 23  Patient: Heather Estrada  Patient : 1933   Referring Provider: Dorcas Mcpherson DO      DIABETIC Eye Exam Date _______________________________      Type of Exam MUST be documented for Diabetic Eye Exams  Please CHECK ONE  Retinal Exam       Dilated Retinal Exam       OCT       Optomap-Iris Exam      Fundus Photography       Left Eye - Please check Retinopathy or No Retinopathy        Exam did show retinopathy    Exam did not show retinopathy       Right Eye - Please check Retinopathy or No Retinopathy       Exam did show retinopathy    Exam did not show retinopathy       Comments __________________________________________________________    Practice Providing Exam ______________________________________________    Exam Performed By (print name) _______________________________________      Provider Signature ___________________________________________________      These reports are needed for  compliance  Please fax this completed form and a copy of the Diabetic Eye Exam report to our office located at [place] [address] as soon as possible via Fax [phone] hillary English: Phone [phone]  We thank you for your assistance in treating our mutual patient

## 2023-05-01 NOTE — TELEPHONE ENCOUNTER
----- Message from Michael Abdullahi sent at 5/1/2023 12:23 PM EDT -----  Regarding: DM Eye exam  05/01/23 12:24 PM    Hello, our patient Jason Ochoa has had Diabetic Eye Exam completed/performed  Please assist in updating the patient chart by making an External outreach to  Eye care facility located in [facility]    The date of service is 2022/2023       Thank you,  Pretty Sexton MA  [facility] PRIMARY [facility]

## 2023-05-01 NOTE — TELEPHONE ENCOUNTER
Upon review of the In Basket request and the patient's chart, initial outreach has been made via fax to facility  Please see Contacts section for details       Thank you  Alex Traore MA IMPROVE VTE Individual Risk Assessment          RISK                                                          Points  [  ] Previous VTE                                                3  [  ] Thrombophilia                                             2  [  ] Lower limb paralysis                                   2        (unable to hold up >15 seconds)    [  ] Current Cancer                                             2         (within 6 months)  [  ] Immobilization > 24 hrs                              1  [  ] ICU/CCU stay > 24 hours                             1  [ x ] Age > 60                                                         1    IMPROVE VTE Score: 1    DVT PPX: Will give SCDs in setting of GI bleed  Code Status: Pt is DNR, but NOT DNI, MOLST form in chart

## 2023-05-01 NOTE — PROGRESS NOTES
Depression Screening and Follow-up Plan: Patient was screened for depression during today's encounter  They screened negative with a PHQ-2 score of 2  Urinary Incontinence Plan of Care: counseling topics discussed: taking fluid pills at a time when you can get to bathroom easily  Arash PLEITEZ  [address] [facility] Physician [facility]  [address] [facility]  [address] [place] [address],[address], [place], [address] [place] [address] SUBSEQUENT VISIT NOTE ( part 1 )      NAME: Dennise Stout  AGE: 80 y o  SEX: female  : 1933   MRN: [id number]    DATE: 2023  TIME: 12:40 PM    Assessment and Plan     Problem List Items Addressed This Visit     Chronic anticoagulation    Bladder incontinence    OA (osteoarthritis)    Esophageal reflux    Infrarenal abdominal aortic aneurysm (AAA) without rupture (HCC)    Chronic combined systolic and diastolic congestive heart failure (HCC)    Relevant Orders    Basic metabolic panel    Microalbumin / creatinine urine ratio    TSH, 3rd generation with Free T4 reflex    Atrial fibrillation ([address] )    Relevant Orders    TSH, 3rd generation with Free T4 reflex    Coronary arteriosclerosis    Relevant Orders    Lipid panel    Essential hypertension    Relevant Orders    Basic metabolic panel    Obesity    Type 2 diabetes mellitus with diabetic polyneuropathy, without long-term current use of insulin (HCC)    Relevant Orders    Lipid panel    Basic metabolic panel    Microalbumin / creatinine urine ratio    TSH, 3rd generation with Free T4 reflex    Hemoglobin A1C   Other Visit Diagnoses     Medicare annual wellness visit, subsequent    -  Primary    E  coli UTI (urinary tract infection 1 month ago )              Medicare Wellness Counseling/ Discussion    Patient Instructions     Reviewed health history along with medications  Overall she is doing well here today      She had been seen at the emergency room , left flank pain, urine culture did show E  coli resistant to Cipro, she received ceftriaxone at the emergency room, also use Duricef 500 mg twice daily for 1 week, call us if urinary symptoms worsen  At the emergency room CT scanning of abdomen, pelvis along with chest x-ray looked okay  CBC showed normal WBC, hemoglobin 13 8  CMP was unremarkable with BUNs/creatinine 19/0 89, potassium 4 4  Fluid status appears stable, she has had no increased shortness of breath  She does continue Furosemide 20 mg twice daily, Metoprolol Tartrate 100 mg twice daily, Lisinopril 10 mg daily, Atorvastatin 80 mg daily, Zetia 10 mg daily  She will be seeing cardiology later this month  Also continues on Eliquis 5 mg twice daily, has had no bleeding    I would like her to do fasting lipids, BMP, A1c, TSH along with microalbumin  With weight loss she has remained off glimepiride, await A1c  Glucose nonfasting at     She does see podiatry, Dr Tim Toledo for nail care  She also does see [facility] eye care for eye exams  We will see her again in 6 months, call sooner if needed        Immunization History   Administered Date(s) Administered    COVID-19 MODERNA VACC 0 5 ML IM 01/20/2021, 02/17/2021, 11/17/2021    INFLUENZA 10/15/2015, 11/01/2017    Influenza Split High Dose Preservative Free IM 09/23/2014, 10/24/2015, 09/27/2016    Influenza, high dose seasonal 0 7 mL 11/13/2018, 11/07/2019, 09/21/2020    Influenza, seasonal, injectable 01/21/2009, 10/01/2011, 10/01/2012    Pneumococcal 10/18/2015    Pneumococcal Conjugate 13-Valent 03/12/2015    Pneumococcal Polysaccharide PPV23 07/22/2003    Zoster 03/01/2011       Discussed Vaccines,    Pneumococcal vax  is up to date  She does do yearly Flu shot  Tdap/tetanus shot will be done at a future date  (done every 10 yrs for superficial cuts, every 5 yrs for deep wounds)   Can also look into coverage for 'shingles' shot, Shingrix  Can do that at pharmacy    Covid vaccine rcvd - she relates she did booster "elsewhere    Non-smoker     Regarding Colon Cancer screening,    Not indicated    She does not see her Gynecologist routinely  Not indicated  Mammogram screening was discussed, is  not indicated  Discussed bone density screening/ DEXA Scan, not indicated  We discussed end of life planning, she does have a  \"LIVING WILL/ POA\"     Glaucoma screening is up-to-date    Discussed importance of routine exercise, healthy diet  We will see her back in 6 months, sooner as needed  Chief Complaint     Chief Complaint   Patient presents with    Medicare Wellness Visit       History of Present Illness     Werner Cardona is a 80y o -year-old female who is in for a routine annual wellness/regular follow-up  Overall she has been feeling well, was treated for UTI, no further flank pain, no current increased urinary symptoms  She is using medication as directed, no increased shortness of breath, no recent chest pain no increased palpitations  Uses walker to ambulate, has had no falls  Continues on anticoagulation via cardiology    Review of Systems     Review of Systems   Constitutional: Positive for fatigue (baseline)  Negative for activity change (uses walker), appetite change, fever and unexpected weight change (down 7 lbs -  appetite ok -  )  HENT: Negative for sore throat and trouble swallowing  Respiratory: Negative for cough, chest tightness, shortness of breath (baseline mild GALICIA) and wheezing  Cardiovascular: Negative for chest pain, palpitations and leg swelling  Gastrointestinal: Negative for abdominal pain, blood in stool, nausea and vomiting  No acid reflux     No change in bowel   Genitourinary: Positive for frequency  Negative for dysuria and hematuria  Occ incont   Neurological: Negative for dizziness, syncope, light-headedness and headaches  Psychiatric/Behavioral: Negative for behavioral problems and confusion         Active Problem List     Patient Active Problem " List   Diagnosis    Atrial fibrillation ([facility] )    Bilateral impacted cerumen    Bladder incontinence    Carotid artery stenosis    Type 2 diabetes mellitus with diabetic polyneuropathy, without long-term current use of insulin (HCC)    Coronary arteriosclerosis    Esophageal reflux    Hyperlipidemia    Essential hypertension    Lipoma    OA (osteoarthritis)    Overactive bladder    Right lumbar radiculopathy    Seborrheic dermatitis    Tricuspid valve disorders, non-rheumatic    Chronic combined systolic and diastolic congestive heart failure (HCC)    Obesity    Sinus bradycardia    Spinal stenosis of lumbar region    H/O scarlet fever    Mitral annular calcification    Chronic anticoagulation    Infrarenal abdominal aortic aneurysm (AAA) without rupture (HCC)       Past Medical History:  Past Medical History:   Diagnosis Date    Atrial fibrillation with rapid ventricular response (HCC)     RESOLVED: [id number]    Lumbar compression fracture (HCC)     RESOLVED: [id number]    Meningocele (HCC)     ACQUIRED SPINAL CORD; SURGERY 1934    Nasal fracture     RESOLVED: [id number]       Past Surgical History:  Past Surgical History:   Procedure Laterality Date    CATARACT EXTRACTION      COLONOSCOPY      ONSET: 1998    HYSTERECTOMY      REPLACEMENT TOTAL KNEE BILATERAL      ONSET: NOV 2011    SCALP EXCISION  1934    LESION;     TONSILLECTOMY      TRANSLUMINAL ANGIOPLASTY      INTRAOPERATIVE        Family History:  Family History   Problem Relation Age of Onset    Eclampsia Mother     Lung cancer Father     Diabetes Son     Breast cancer Family        Social History:  Social History     Tobacco Use    Smoking status: Never    Smokeless tobacco: Never   Substance Use Topics    Alcohol use: No       Objective     Vitals:    05/01/23 1114   BP: 126/74   BP Location: Left arm   Patient Position: Sitting   Cuff Size: Large   Pulse: 90   Temp: (!) 96 6 °F (35 9 °C)   SpO2: 97%   Weight: 81 6 kg "(180 lb)   Height: 5' 2 5\" (1 588 m)     Body mass index is 32 4 kg/m²  BP Readings from Last 3 Encounters:   05/01/23 126/74   03/30/23 130/74   02/10/23 132/76       Wt Readings from Last 3 Encounters:   05/01/23 81 6 kg (180 lb)   03/30/23 83 1 kg (183 lb 3 2 oz)   02/10/23 85 1 kg (187 lb 9 6 oz)       Physical Exam  Constitutional:       General: She is not in acute distress  Appearance: Normal appearance  She is well-developed  HENT:      Right Ear: Tympanic membrane normal       Left Ear: Tympanic membrane normal       Mouth/Throat:      Pharynx: Oropharynx is clear  Eyes:      General: No scleral icterus  Cardiovascular:      Rate and Rhythm: Normal rate and regular rhythm  Heart sounds: Murmur heard  Pulmonary:      Effort: Pulmonary effort is normal  No respiratory distress  Breath sounds: Normal breath sounds  No wheezing, rhonchi or rales  Abdominal:      General: There is no distension  Palpations: Abdomen is soft  Tenderness: There is no abdominal tenderness  There is no guarding  Musculoskeletal:      Comments: Chronic mild edema ankles-  No pitting today   Lymphadenopathy:      Cervical: No cervical adenopathy  Neurological:      General: No focal deficit present  Mental Status: She is alert     Psychiatric:         Mood and Affect: Mood normal          Behavior: Behavior normal          ALLERGIES:  Allergies   Allergen Reactions    Celecoxib      Reaction Date: [id number];     Latex     Adhesive [Medical Tape] Rash    Iodinated Contrast Media Rash    Sulfa Antibiotics Rash       Current Medications     Current Outpatient Medications   Medication Sig Dispense Refill    apixaban (ELIQUIS) 5 mg Take 5 mg by mouth 2 (two) times a day      atorvastatin (LIPITOR) 80 mg tablet Take 1 tablet by mouth      cycloSPORINE (RESTASIS) 0 05 % ophthalmic emulsion Apply 1 drop to eye Medrol Dose Pack scheduling ONLY        ezetimibe (ZETIA) 10 mg tablet Take 1 " tablet (10 mg total) by mouth daily 90 tablet 3    furosemide (LASIX) 20 mg tablet Take 1 tablet (20 mg total) by mouth 2 (two) times a day ( or as directed) 180 tablet 1    glucose blood (TRUETRACK TEST) test strip Use as instructed 100 each 3    lisinopril (ZESTRIL) 20 mg tablet TAKE 1 TABLET DAILY 90 tablet 3    metoprolol tartrate (LOPRESSOR) 100 mg tablet Take 1 tablet (100 mg total) by mouth every 12 (twelve) hours 180 tablet 3    Multiple Vitamins-Minerals (HAIR SKIN AND NAILS FORMULA) TABS Take by mouth      nitroglycerin (Nitrostat) 0 4 mg SL tablet Place 1 tablet (0 4 mg total) under the tongue as needed for chest pain 25 tablet 0    Cholecalciferol (VITAMIN D) 2000 units CAPS Take by mouth daily  (Patient not taking: Reported on 5/1/2023)      nystatin (MYCOSTATIN) cream Apply topically 2 (two) times a day ( to red, irritated area buttocks for 2 weeks ) (Patient not taking: Reported on 5/1/2023) 30 g 1    penicillin V potassium (VEETID) 500 mg tablet as needed For dental procedures  (Patient not taking: Reported on 5/1/2023)       No current facility-administered medications for this visit           Health Maintenance     See other note today re clinical AWV info             Most recent labs available from [address] [place] [address]   ( others may be available in Care Everywhere / Media sections)  Lab Results   Component Value Date    WBC 6 99 03/30/2021    HGB 13 6 03/30/2021    HCT 41 3 03/30/2021     03/30/2021    CHOL 186 12/01/2015    TRIG 117 09/21/2020    HDL 63 09/21/2020    ALT 19 03/30/2021    AST 23 03/30/2021     12/01/2015    K 4 2 03/30/2021     03/30/2021    CREATININE 1 13 03/30/2021    BUN 25 03/30/2021    CO2 27 03/30/2021    GLUF 172 (H) 03/30/2021    HGBA1C 8 4 (A) 03/30/2021       Orders Placed This Encounter   Procedures    Lipid panel    Basic metabolic panel    Microalbumin / creatinine urine ratio    TSH, 3rd generation with Free T4 reflex    Hemoglobin A1C Bora Burton, DO

## 2023-05-01 NOTE — PATIENT INSTRUCTIONS
Reviewed health history along with medications  Overall she is doing well here today  She had been seen at the emergency room March 28, left flank pain, urine culture did show E  coli resistant to Cipro, she received ceftriaxone at the emergency room, also use Duricef 500 mg twice daily for 1 week, call us if urinary symptoms worsen  At the emergency room CT scanning of abdomen, pelvis along with chest x-ray looked okay  CBC showed normal WBC, hemoglobin 13 8  CMP was unremarkable with BUNs/creatinine 19/0 89, potassium 4 4  Fluid status appears stable, she has had no increased shortness of breath  She does continue Furosemide 20 mg twice daily, Metoprolol Tartrate 100 mg twice daily, Lisinopril 10 mg daily, Atorvastatin 80 mg daily, Zetia 10 mg daily  She will be seeing cardiology later this month  Also continues on Eliquis 5 mg twice daily, has had no bleeding    I would like her to do fasting lipids, BMP, A1c, TSH along with microalbumin  With weight loss she has remained off glimepiride, await A1c  Glucose nonfasting at     She does see podiatry, Dr John Lima for nail care  She also does see [facility] care for eye exams  We will see her again in 6 months, call sooner if needed        Immunization History   Administered Date(s) Administered    COVID-19 MODERNA VACC 0 5 ML IM 01/20/2021, 02/17/2021, 11/17/2021    INFLUENZA 10/15/2015, 11/01/2017    Influenza Split High Dose Preservative Free IM 09/23/2014, 10/24/2015, 09/27/2016    Influenza, high dose seasonal 0 7 mL 11/13/2018, 11/07/2019, 09/21/2020    Influenza, seasonal, injectable 01/21/2009, 10/01/2011, 10/01/2012    Pneumococcal 10/18/2015    Pneumococcal Conjugate 13-Valent 03/12/2015    Pneumococcal Polysaccharide PPV23 07/22/2003    Zoster 03/01/2011       Discussed Vaccines,    Pneumococcal vax  is up to date  She does do yearly Flu shot     Tdap/tetanus shot will be done at a future date  (done every 10 yrs for superficial "cuts, every 5 yrs for deep wounds)   Can also look into coverage for 'shingles' shot, Shingrix  Can do that at pharmacy  Covid vaccine rcvd - she relates she did booster elsewhere    Non-smoker     Regarding Colon Cancer screening,    Not indicated    She does not see her Gynecologist routinely  Not indicated  Mammogram screening was discussed, is  not indicated  Discussed bone density screening/ DEXA Scan, not indicated  We discussed end of life planning, she does have a  \"LIVING WILL/ POA\"     Glaucoma screening is up-to-date    Discussed importance of routine exercise, healthy diet  We will see her back in 6 months, sooner as needed         "

## 2023-05-05 NOTE — PATIENT INSTRUCTIONS
Begin taking duricef 2x/day   Eat a yogurt or take a probiotic once daily while taking    Repeat a urine in 2 weeks  Patient to call for results if he/she does not hear from us right upper arm

## 2023-06-16 DIAGNOSIS — S31.819A WOUND OF RIGHT BUTTOCK, INITIAL ENCOUNTER: ICD-10-CM

## 2023-06-17 RX ORDER — NYSTATIN 100000 U/G
CREAM TOPICAL 2 TIMES DAILY
Qty: 30 G | Refills: 1 | Status: SHIPPED | OUTPATIENT
Start: 2023-06-17

## 2023-07-12 ENCOUNTER — OFFICE VISIT (OUTPATIENT)
Dept: FAMILY MEDICINE CLINIC | Facility: CLINIC | Age: 88
End: 2023-07-12
Payer: COMMERCIAL

## 2023-07-12 VITALS
HEART RATE: 98 BPM | OXYGEN SATURATION: 96 % | SYSTOLIC BLOOD PRESSURE: 124 MMHG | DIASTOLIC BLOOD PRESSURE: 76 MMHG | WEIGHT: 184.2 LBS | BODY MASS INDEX: 33.69 KG/M2

## 2023-07-12 DIAGNOSIS — R42 DIZZINESS: Primary | ICD-10-CM

## 2023-07-12 PROCEDURE — 99214 OFFICE O/P EST MOD 30 MIN: CPT | Performed by: FAMILY MEDICINE

## 2023-07-12 NOTE — PROGRESS NOTES
Assessment/Plan:       Problem List Items Addressed This Visit    None  Visit Diagnoses     Dizziness    -  Primary          1. Dizziness    Symptoms have resolved recommend completing labs as previously ordered, patient will let us know if any symptoms return. Hold off on further work up at this time as all symptoms have resolved    Subjective:      Patient ID: Ron Nieto is a 80 y.o. female. HPI    80year old female, with pmh of afib, type 2 diabetes, CHF, presenting after episode of light headiness this morning. Patient reports she had some dizziness priscilla as if room was spinning when she was walking this morning after first standing up, she sat down and called her son, her symptoms resolved on this time. She did not synopsize or feel like she would pass out     She states this is happened before but was not particularly sure. After this her symptoms resolved on their own. She was concerned it was due to her new hearing aids    Currently she is feeling well. Orthostatics 120 70, laying down, sitting up 124/68, heart rate 90.         The following portions of the patient's history were reviewed and updated as appropriate: allergies, current medications, past family history, past medical history, past social history, past surgical history and problem list.      Current Outpatient Medications:   •  apixaban (ELIQUIS) 5 mg, Take 5 mg by mouth 2 (two) times a day, Disp: , Rfl:   •  atorvastatin (LIPITOR) 80 mg tablet, Take 1 tablet by mouth, Disp: , Rfl:   •  Cholecalciferol (VITAMIN D) 2000 units CAPS, Take by mouth daily, Disp: , Rfl:   •  cycloSPORINE (RESTASIS) 0.05 % ophthalmic emulsion, Apply 1 drop to eye Medrol Dose Pack scheduling ONLY  , Disp: , Rfl:   •  ezetimibe (ZETIA) 10 mg tablet, Take 1 tablet (10 mg total) by mouth daily, Disp: 90 tablet, Rfl: 3  •  furosemide (LASIX) 20 mg tablet, Take 1 tablet (20 mg total) by mouth 2 (two) times a day ( or as directed), Disp: 180 tablet, Rfl: 1  •  glucose blood (TRUETRACK TEST) test strip, Use as instructed, Disp: 100 each, Rfl: 3  •  lisinopril (ZESTRIL) 20 mg tablet, TAKE 1 TABLET DAILY, Disp: 90 tablet, Rfl: 3  •  metoprolol tartrate (LOPRESSOR) 100 mg tablet, Take 1 tablet (100 mg total) by mouth every 12 (twelve) hours, Disp: 180 tablet, Rfl: 3  •  Multiple Vitamins-Minerals (HAIR SKIN AND NAILS FORMULA) TABS, Take by mouth, Disp: , Rfl:   •  nitroglycerin (Nitrostat) 0.4 mg SL tablet, Place 1 tablet (0.4 mg total) under the tongue as needed for chest pain, Disp: 25 tablet, Rfl: 0  •  nystatin (MYCOSTATIN) cream, Apply topically 2 (two) times a day ( to red, irritated area buttocks for 2 weeks ) (Patient not taking: Reported on 7/12/2023), Disp: 30 g, Rfl: 1  •  penicillin V potassium (VEETID) 500 mg tablet, as needed For dental procedures. (Patient not taking: Reported on 5/1/2023), Disp: , Rfl:      Review of Systems   Constitutional: Negative for activity change and appetite change. Respiratory: Negative for apnea. Cardiovascular: Negative for chest pain and palpitations. Gastrointestinal: Negative for abdominal distention and abdominal pain. Genitourinary: Negative for difficulty urinating and dysuria. Musculoskeletal: Negative for arthralgias and back pain. Objective:      /76 (BP Location: Left arm, Patient Position: Sitting, Cuff Size: Standard)   Pulse 98   Wt 83.6 kg (184 lb 3.2 oz)   SpO2 96%   BMI 33.69 kg/m²          Physical Exam  Constitutional:       Appearance: Normal appearance. Cardiovascular:      Rate and Rhythm: Rhythm irregular. Comments: Systolic murmur  Pulmonary:      Effort: Pulmonary effort is normal.      Breath sounds: Normal breath sounds. Musculoskeletal:      Comments: Using walker, walking well, able to get on exam table with minimal assistance   Neurological:      General: No focal deficit present. Mental Status: She is alert and oriented to person, place, and time. Reno Kaplan MD

## 2023-10-17 ENCOUNTER — OFFICE VISIT (OUTPATIENT)
Dept: URGENT CARE | Facility: MEDICAL CENTER | Age: 88
End: 2023-10-17
Payer: COMMERCIAL

## 2023-10-17 VITALS
HEART RATE: 66 BPM | HEIGHT: 62 IN | OXYGEN SATURATION: 96 % | BODY MASS INDEX: 33.86 KG/M2 | TEMPERATURE: 99.3 F | WEIGHT: 184 LBS | SYSTOLIC BLOOD PRESSURE: 119 MMHG | DIASTOLIC BLOOD PRESSURE: 60 MMHG | RESPIRATION RATE: 20 BRPM

## 2023-10-17 DIAGNOSIS — B02.9 HERPES ZOSTER WITHOUT COMPLICATION: Primary | ICD-10-CM

## 2023-10-17 PROCEDURE — 99213 OFFICE O/P EST LOW 20 MIN: CPT

## 2023-10-17 PROCEDURE — S9083 URGENT CARE CENTER GLOBAL: HCPCS

## 2023-10-17 RX ORDER — VALACYCLOVIR HYDROCHLORIDE 500 MG/1
500 TABLET, FILM COATED ORAL 3 TIMES DAILY
Qty: 21 TABLET | Refills: 0 | Status: SHIPPED | OUTPATIENT
Start: 2023-10-17 | End: 2023-10-24

## 2023-10-17 NOTE — PROGRESS NOTES
[facility]        NAME: Ronaldo Wells is a 80 y.o. female  : 1933    MRN: [id number]  DATE: 2023  TIME: 1:55 PM    Assessment and Plan   Herpes zoster without complication [A10.2]  1. Herpes zoster without complication  valACYclovir (VALTREX) 500 mg tablet          Patient Instructions     Start Valtrex as prescribed  Follow up with PCP in 3-5 days. Proceed to  ER if symptoms worsen. Chief Complaint     Chief Complaint   Patient presents with    Rash     Patient states she has been having pain in her L upper chest, neck and ar. The rash appeared . The rash starts in her L side of her chest and radiates up into her neck, L arm          History of Present Illness       Patient is an 81 yo female with no significant PMH presenting in the clinic today for rash x 2 days. Admits burning and painful rash located on left side of chest and left side of face. Denies fever, chills,  chest pain, and SOB. Admits the use of ethanol cream for sx management. Review of Systems   Review of Systems   Constitutional:  Negative for chills and fever. Respiratory:  Negative for shortness of breath. Cardiovascular:  Negative for chest pain. Skin:  Positive for rash.          Current Medications       Current Outpatient Medications:     valACYclovir (VALTREX) 500 mg tablet, Take 1 tablet (500 mg total) by mouth 3 (three) times a day for 7 days, Disp: 21 tablet, Rfl: 0    apixaban (ELIQUIS) 5 mg, Take 5 mg by mouth 2 (two) times a day, Disp: , Rfl:     atorvastatin (LIPITOR) 80 mg tablet, Take 1 tablet by mouth, Disp: , Rfl:     Cholecalciferol (VITAMIN D) 2000 units CAPS, Take by mouth daily, Disp: , Rfl:     cycloSPORINE (RESTASIS) 0.05 % ophthalmic emulsion, Apply 1 drop to eye Medrol Dose Pack scheduling ONLY  , Disp: , Rfl:     ezetimibe (ZETIA) 10 mg tablet, Take 1 tablet (10 mg total) by mouth daily, Disp: 90 tablet, Rfl: 3    furosemide (LASIX) 20 mg tablet, Take 1 tablet (20 mg total) by mouth 2 (two) times a day ( or as directed), Disp: 180 tablet, Rfl: 1    glucose blood (TRUETRACK TEST) test strip, Use as instructed, Disp: 100 each, Rfl: 3    lisinopril (ZESTRIL) 20 mg tablet, TAKE 1 TABLET DAILY, Disp: 90 tablet, Rfl: 3    metoprolol tartrate (LOPRESSOR) 100 mg tablet, Take 1 tablet (100 mg total) by mouth every 12 (twelve) hours, Disp: 180 tablet, Rfl: 3    Multiple Vitamins-Minerals (HAIR SKIN AND NAILS FORMULA) TABS, Take by mouth, Disp: , Rfl:     nitroglycerin (Nitrostat) 0.4 mg SL tablet, Place 1 tablet (0.4 mg total) under the tongue as needed for chest pain, Disp: 25 tablet, Rfl: 0    nystatin (MYCOSTATIN) cream, Apply topically 2 (two) times a day ( to red, irritated area buttocks for 2 weeks ), Disp: 30 g, Rfl: 1    penicillin V potassium (VEETID) 500 mg tablet, as needed For dental procedures. , Disp: , Rfl:     Current Allergies     Allergies as of 10/17/2023 - Reviewed 10/17/2023   Allergen Reaction Noted    Celecoxib  04/21/2012    Latex  08/08/2012    Adhesive [medical tape] Rash 07/09/2018    Iodinated contrast media Rash 08/08/2012    Sulfa antibiotics Rash 08/08/2012            The following portions of the patient's history were reviewed and updated as appropriate: allergies, current medications, past family history, past medical history, past social history, past surgical history and problem list.     Past Medical History:   Diagnosis Date    Atrial fibrillation with rapid ventricular response ([address])     RESOLVED: [id number]    Lumbar compression fracture ([address])     RESOLVED: [id number]    Meningocele ([address])     ACQUIRED SPINAL CORD; SURGERY 1934    Nasal fracture     RESOLVED: [id number]       Past Surgical History:   Procedure Laterality Date    CATARACT EXTRACTION      COLONOSCOPY      ONSET: 1998    HYSTERECTOMY      REPLACEMENT TOTAL KNEE BILATERAL      ONSET: NOV 2011    SCALP EXCISION  1934    LESION;     TONSILLECTOMY      TRANSLUMINAL ANGIOPLASTY INTRAOPERATIVE        Family History   Problem Relation Age of Onset    Eclampsia Mother     Lung cancer Father     Diabetes Son     Breast cancer Family          Medications have been verified. Objective   /60   Pulse 66   Temp 99.3 °F (37.4 °C)   Resp 20   Ht 5' 2" (1.575 m)   Wt 83.5 kg (184 lb)   SpO2 96%   BMI 33.65 kg/m²        Physical Exam     Physical Exam  Vitals reviewed. Constitutional:       General: She is not in acute distress. Appearance: Normal appearance. She is normal weight. She is not ill-appearing. HENT:      Head: Normocephalic. Nose: Nose normal.      Mouth/Throat:      Mouth: Mucous membranes are moist.   Eyes:      Conjunctiva/sclera: Conjunctivae normal.   Cardiovascular:      Rate and Rhythm: Normal rate and regular rhythm. Pulses: Normal pulses. Heart sounds: Normal heart sounds. No murmur heard. No friction rub. No gallop. Pulmonary:      Effort: Pulmonary effort is normal.      Breath sounds: Normal breath sounds. No wheezing, rhonchi or rales. Musculoskeletal:      Cervical back: Normal range of motion and neck supple. Skin:     General: Skin is warm. Findings: Erythema and rash present. Rash is vesicular. Comments: Maculopapular and clear vesicular rash on an erythematous base located along dermatomal patterns of left side of chest wall and left side of neck. No ocular involvement noted. No drainage noted. Consistent with herpes zoster. Neurological:      Mental Status: She is alert.    Psychiatric:         Mood and Affect: Mood normal.         Behavior: Behavior normal.

## 2023-10-26 ENCOUNTER — RA CDI HCC (OUTPATIENT)
Dept: OTHER | Facility: HOSPITAL | Age: 88
End: 2023-10-26

## 2023-10-26 NOTE — PROGRESS NOTES
E11.22  [facility] coding opportunities          Chart Reviewed number of suggestions sent to Provider: 1     Patients Insurance     Medicare Insurance: [facility]

## 2023-11-06 ENCOUNTER — TELEPHONE (OUTPATIENT)
Dept: FAMILY MEDICINE CLINIC | Facility: CLINIC | Age: 88
End: 2023-11-06

## 2023-11-08 ENCOUNTER — OFFICE VISIT (OUTPATIENT)
Dept: FAMILY MEDICINE CLINIC | Facility: CLINIC | Age: 88
End: 2023-11-08
Payer: COMMERCIAL

## 2023-11-08 VITALS
SYSTOLIC BLOOD PRESSURE: 120 MMHG | DIASTOLIC BLOOD PRESSURE: 70 MMHG | TEMPERATURE: 97.5 F | WEIGHT: 180.8 LBS | HEART RATE: 65 BPM | HEIGHT: 62 IN | OXYGEN SATURATION: 98 % | BODY MASS INDEX: 33.27 KG/M2

## 2023-11-08 DIAGNOSIS — B02.9 HERPES ZOSTER WITHOUT COMPLICATION: Primary | ICD-10-CM

## 2023-11-08 PROCEDURE — 99213 OFFICE O/P EST LOW 20 MIN: CPT | Performed by: PHYSICIAN ASSISTANT

## 2023-11-08 NOTE — PROGRESS NOTES
FAMILY PRACTICE ACUTE OFFICE VISIT  [facility]       NAME: Randolph Vilchis  AGE: 80 y.o. SEX: female       : 1933        MRN: [id number]    DATE: 2023  TIME: 2:08 AM    Assessment and Plan     Problem List Items Addressed This Visit    None  Visit Diagnoses       Herpes zoster without complication    -  Primary    Resolving. Patient was encouraged to continue with Tylenol PRN. Call if pain persists. Chief Complaint     Chief Complaint   Patient presents with    Herpes Zoster     X3 weeks       History of Present Illness   Randolph Vilchis is a 80y.o.-year-old female who presents for shingles x 3 weeks. Notes that she has shingles over the last 3 weeks - notes that the rash was in the posterior left neck and into the shoulder - took Valtrex and has been using a black drawing salve - feels that it is looking better - notes that it is feeling better too - reports taking Tylenol about 1 tablet twice daily (sometimes midday has a third) with good relief - concerned about voice going in and out and feeling sore and scratchy - has no voice in the morning for a little while - better once gets a drink in the morning           Review of Systems   Review of Systems   Constitutional:  Negative for chills and fever. Gastrointestinal:  Negative for nausea and vomiting. Skin:  Positive for rash.        Active Problem List     Patient Active Problem List   Diagnosis    Atrial fibrillation ([address])    Bilateral impacted cerumen    Bladder incontinence    Carotid artery stenosis    Type 2 diabetes mellitus with diabetic polyneuropathy, without long-term current use of insulin (HCC)    Coronary arteriosclerosis    Esophageal reflux    Hyperlipidemia    Essential hypertension    Lipoma    OA (osteoarthritis)    Overactive bladder    Right lumbar radiculopathy    Seborrheic dermatitis    Tricuspid valve disorders, non-rheumatic    Chronic combined systolic and diastolic congestive heart failure (HCC)    Obesity    Sinus bradycardia    Spinal stenosis of lumbar region    H/O scarlet fever    Mitral annular calcification    Chronic anticoagulation    Infrarenal abdominal aortic aneurysm (AAA) without rupture (HCC)         Social History:  Social History     Socioeconomic History    Marital status: Single     Spouse name: Not on file    Number of children: Not on file    Years of education: Not on file    Highest education level: Not on file   Occupational History    Not on file   Tobacco Use    Smoking status: Never    Smokeless tobacco: Never   Vaping Use    Vaping Use: Never used   Substance and Sexual Activity    Alcohol use: No    Drug use: No    Sexual activity: Not Currently   Other Topics Concern    Not on file   Social History Narrative    Not on file     Social Determinants of Health     Financial Resource Strain: Not on file   Food Insecurity: Not on file   Transportation Needs: Not on file   Physical Activity: Not on file   Stress: Not on file   Social Connections: Not on file   Intimate Partner Violence: Not on file   Housing Stability: Not on file       Objective     Vitals:    11/08/23 1439   BP: 120/70   BP Location: Left arm   Patient Position: Sitting   Cuff Size: Large   Pulse: 65   Temp: 97.5 °F (36.4 °C)   TempSrc: Tympanic   SpO2: 98%   Weight: 82 kg (180 lb 12.8 oz)   Height: 5' 2" (1.575 m)     Wt Readings from Last 3 Encounters:   11/08/23 82 kg (180 lb 12.8 oz)   10/17/23 83.5 kg (184 lb)   07/12/23 83.6 kg (184 lb 3.2 oz)       Physical Exam  Vitals reviewed. Constitutional:       General: She is not in acute distress. Appearance: Normal appearance. She is not ill-appearing. Cardiovascular:      Rate and Rhythm: Normal rate and regular rhythm. Pulses: Normal pulses. Heart sounds: Normal heart sounds. No murmur heard. Pulmonary:      Effort: Pulmonary effort is normal.      Breath sounds: Normal breath sounds.  No wheezing, rhonchi or rales. Skin:     Findings: Rash (clusters of erythematous macules along the left neck - only one with scaling overlying it) present. Neurological:      Mental Status: She is alert. Psychiatric:         Mood and Affect: Mood normal.         Behavior: Behavior normal.         Thought Content: Thought content normal.           ALLERGIES:  Allergies   Allergen Reactions    Celecoxib      Reaction Date: [id number];     Latex     Adhesive [Medical Tape] Rash    Iodinated Contrast Media Rash    Sulfa Antibiotics Rash       Current Medications     Current Outpatient Medications   Medication Sig Dispense Refill    apixaban (ELIQUIS) 5 mg Take 5 mg by mouth 2 (two) times a day      atorvastatin (LIPITOR) 80 mg tablet Take 1 tablet by mouth      Cholecalciferol (VITAMIN D) 2000 units CAPS Take by mouth daily      cycloSPORINE (RESTASIS) 0.05 % ophthalmic emulsion Apply 1 drop to eye Medrol Dose Pack scheduling ONLY        ezetimibe (ZETIA) 10 mg tablet Take 1 tablet (10 mg total) by mouth daily 90 tablet 3    furosemide (LASIX) 20 mg tablet Take 1 tablet (20 mg total) by mouth 2 (two) times a day ( or as directed) 180 tablet 1    glucose blood (TRUETRACK TEST) test strip Use as instructed 100 each 3    lisinopril (ZESTRIL) 20 mg tablet TAKE 1 TABLET DAILY 90 tablet 3    metoprolol tartrate (LOPRESSOR) 100 mg tablet Take 1 tablet (100 mg total) by mouth every 12 (twelve) hours 180 tablet 3    Multiple Vitamins-Minerals (HAIR SKIN AND NAILS FORMULA) TABS Take by mouth      nitroglycerin (Nitrostat) 0.4 mg SL tablet Place 1 tablet (0.4 mg total) under the tongue as needed for chest pain 25 tablet 0    nystatin (MYCOSTATIN) cream Apply topically 2 (two) times a day ( to red, irritated area buttocks for 2 weeks ) 30 g 1    penicillin V potassium (VEETID) 500 mg tablet as needed For dental procedures.       valACYclovir (VALTREX) 500 mg tablet Take 1 tablet (500 mg total) by mouth 3 (three) times a day for 7 days 21 tablet 0     No current facility-administered medications for this visit.          Ricardo Harris PA-C  11/13/2023 2:08 AM  [facility]

## 2023-11-13 ENCOUNTER — TELEPHONE (OUTPATIENT)
Dept: FAMILY MEDICINE CLINIC | Facility: CLINIC | Age: 88
End: 2023-11-13

## 2023-11-13 DIAGNOSIS — B02.8 HERPES ZOSTER WITH COMPLICATION: ICD-10-CM

## 2023-11-13 RX ORDER — VALACYCLOVIR HYDROCHLORIDE 500 MG/1
500 TABLET, FILM COATED ORAL 3 TIMES DAILY
Qty: 21 TABLET | Refills: 0 | Status: SHIPPED | OUTPATIENT
Start: 2023-11-13 | End: 2023-11-20

## 2023-11-13 NOTE — TELEPHONE ENCOUNTER
Called pt per Dr. Sandra Segundo to let her know that he sent in a prescription to her pharmacy, and to set up an appt one day next week for a follow up. Pt stated she will have to call back to set up an appt because she has to check with her son to see when he is available since he is the one that brings her to the appt.

## 2023-11-13 NOTE — TELEPHONE ENCOUNTER
Patient c/o areas/blisters of Shingles rash at posterior L neck and L shoulder that appear to be worsening again. Pt feels they're "starting all over again."    Patient is asking if any further Tx is necessary or if it requires more time to run its course. Please advise.

## 2023-11-13 NOTE — TELEPHONE ENCOUNTER
Please call her back, I did send in another course of antiviral medication, Valtrex to use 3 times daily for 1 week.   Please set her up for a recheck visit in the office next week

## 2023-11-21 NOTE — TELEPHONE ENCOUNTER
11-21-23 / 9:50 AM    Pt called in to confirm her appt for tomorrow with Dr. Joseph Edwards stating I told her I would put her in. I explained to her from our previous conversation that I did offer her an appt, but that she didn't take it because she had to call her son to see if he could bring her, and she would call back to let us know. I told her the next appt with Dr. Joseph Edwards wouldn't be until Dec. 5, 2023. She stated she didn't want to wait that long to see him. I explained then I would have to put her with one of the other providers. She was hesitant at first because again she stated he had to check with her son to see if he could bring her. I explained to her again that if she keeps waiting to take appts they'll be used by someone else. She asked me to put her in, and she'll call back if it doesn't work for her son. Her appt is scheduled for 11/28/23 @ 12:40 PM with Dr. .Sharmon Hodgkins.

## 2023-12-20 DIAGNOSIS — I48.21 PERMANENT ATRIAL FIBRILLATION (HCC): Primary | ICD-10-CM

## 2023-12-20 NOTE — TELEPHONE ENCOUNTER
PATIENT NEEDS  A SCRIPT FOR 10DAY SUPPLY  SENT TO [facility] TO HOLD HER OVER UNTIL MAILAWAY COMES IN.

## 2024-02-03 DIAGNOSIS — I10 ESSENTIAL HYPERTENSION: ICD-10-CM

## 2024-02-03 DIAGNOSIS — I48.21 PERMANENT ATRIAL FIBRILLATION (HCC): ICD-10-CM

## 2024-02-03 DIAGNOSIS — I25.10 CORONARY ARTERIOSCLEROSIS: ICD-10-CM

## 2024-02-03 RX ORDER — METOPROLOL TARTRATE 100 MG/1
100 TABLET ORAL EVERY 12 HOURS
Qty: 180 TABLET | Refills: 3 | Status: SHIPPED | OUTPATIENT
Start: 2024-02-03

## 2024-02-21 PROBLEM — H61.23 BILATERAL IMPACTED CERUMEN: Status: RESOLVED | Noted: 2017-09-01 | Resolved: 2024-02-21

## 2024-02-29 DIAGNOSIS — I25.10 CORONARY ARTERIOSCLEROSIS: ICD-10-CM

## 2024-02-29 RX ORDER — NITROGLYCERIN 0.4 MG/1
0.4 TABLET SUBLINGUAL AS NEEDED
Qty: 25 TABLET | Refills: 0 | Status: SHIPPED | OUTPATIENT
Start: 2024-02-29

## 2024-03-21 DIAGNOSIS — I10 ESSENTIAL HYPERTENSION: ICD-10-CM

## 2024-03-21 RX ORDER — LISINOPRIL 20 MG/1
TABLET ORAL
Qty: 90 TABLET | Refills: 1 | Status: SHIPPED | OUTPATIENT
Start: 2024-03-21

## 2024-10-15 ENCOUNTER — TELEPHONE (OUTPATIENT)
Age: 89
End: 2024-10-15

## 2024-10-15 NOTE — TELEPHONE ENCOUNTER
Giulia Abraham called about patient. Patient has been having transportation issues but still has benefits with her plan. The service is called [facility] [phone]. Just an FYI.

## 2025-01-05 ENCOUNTER — RA CDI HCC (OUTPATIENT)
Dept: OTHER | Facility: HOSPITAL | Age: OVER 89
End: 2025-01-05

## 2025-01-13 ENCOUNTER — OFFICE VISIT (OUTPATIENT)
Dept: FAMILY MEDICINE CLINIC | Facility: CLINIC | Age: OVER 89
End: 2025-01-13
Payer: COMMERCIAL

## 2025-01-13 VITALS
RESPIRATION RATE: 18 BRPM | SYSTOLIC BLOOD PRESSURE: 126 MMHG | HEART RATE: 65 BPM | OXYGEN SATURATION: 98 % | TEMPERATURE: 97.5 F | HEIGHT: 62 IN | DIASTOLIC BLOOD PRESSURE: 78 MMHG | WEIGHT: 183.6 LBS | BODY MASS INDEX: 33.79 KG/M2

## 2025-01-13 DIAGNOSIS — Z23 ENCOUNTER FOR IMMUNIZATION: ICD-10-CM

## 2025-01-13 DIAGNOSIS — R23.8 SKIN IRRITATION: ICD-10-CM

## 2025-01-13 DIAGNOSIS — Z00.00 MEDICARE ANNUAL WELLNESS VISIT, SUBSEQUENT: Primary | ICD-10-CM

## 2025-01-13 DIAGNOSIS — Z79.01 CHRONIC ANTICOAGULATION: ICD-10-CM

## 2025-01-13 DIAGNOSIS — I10 ESSENTIAL HYPERTENSION: ICD-10-CM

## 2025-01-13 DIAGNOSIS — I50.42 CHRONIC COMBINED SYSTOLIC AND DIASTOLIC CONGESTIVE HEART FAILURE (HCC): ICD-10-CM

## 2025-01-13 DIAGNOSIS — I25.10 CORONARY ARTERIOSCLEROSIS: ICD-10-CM

## 2025-01-13 DIAGNOSIS — E11.42 TYPE 2 DIABETES MELLITUS WITH DIABETIC POLYNEUROPATHY, WITHOUT LONG-TERM CURRENT USE OF INSULIN (HCC): ICD-10-CM

## 2025-01-13 DIAGNOSIS — I48.21 PERMANENT ATRIAL FIBRILLATION (HCC): ICD-10-CM

## 2025-01-13 LAB — SL AMB POCT HEMOGLOBIN AIC: 7.5 (ref ?–6.5)

## 2025-01-13 PROCEDURE — G0439 PPPS, SUBSEQ VISIT: HCPCS | Performed by: FAMILY MEDICINE

## 2025-01-13 PROCEDURE — 99213 OFFICE O/P EST LOW 20 MIN: CPT | Performed by: FAMILY MEDICINE

## 2025-01-13 PROCEDURE — G0008 ADMIN INFLUENZA VIRUS VAC: HCPCS

## 2025-01-13 PROCEDURE — 83036 HEMOGLOBIN GLYCOSYLATED A1C: CPT | Performed by: FAMILY MEDICINE

## 2025-01-13 PROCEDURE — 90662 IIV NO PRSV INCREASED AG IM: CPT

## 2025-01-13 RX ORDER — NYSTATIN 100000 U/G
CREAM TOPICAL 2 TIMES DAILY
Qty: 30 G | Refills: 1 | Status: SHIPPED | OUTPATIENT
Start: 2025-01-13

## 2025-01-13 NOTE — PROGRESS NOTES
Name: Rosemary Casper      : 1933      MRN: [id number]  Encounter Provider: Arash Nicole DO  Encounter Date: 2025   Encounter department: [facility] PRIMARY [facility]ARE  :  Assessment & Plan  Medicare annual wellness visit, subsequent         Essential hypertension  Her blood pressure was okay today at 126/78, she was not orthostatic.  Orders:    Comprehensive metabolic panel    Coronary arteriosclerosis  She will continue to see [facility] cardiology, saw Dr. Kent in 2024.  She has not required nitroglycerin recently, continue on -    atorvastatin 80 mg daily,   furosemide 20 mg twice daily,   Zetia 10 mg daily,   metoprolol tartrate 100 mg twice daily,   lisinopril 20 mg daily.      Orders:    CBC    Comprehensive metabolic panel    Last LDL at goal, was less than 60  Chronic combined systolic and diastolic congestive heart failure (HCC)  Wt Readings from Last 3 Encounters:   25 83.3 kg (183 lb 9.6 oz)   23 82 kg (180 lb 12.8 oz)   10/17/23 83.5 kg (184 lb)     She has had Meals on Wheels for the past 6 months, relates meals are salty, has gained about 3 pounds.  Continue on furosemide 20 mg daily, she will go for updated blood work.    Her last blood work was in 2024 at the emergency room, hemoglobin was 13.1, creatinine 1.08 with potassium 4.6, .          Orders:    CBC    Comprehensive metabolic panel    TSH, 3rd generation with Free T4 reflex    B-Type Natriuretic Peptide(BNP); Future    Magnesium; Future    Permanent atrial fibrillation (HCC)  She continues on anticoagulation with Eliquis 5 mg twice daily through cardiology, she relates she may need to be changing this due to insurance coverage.  She has had no bleeding  Orders:    CBC    Comprehensive metabolic panel    TSH, 3rd generation with Free T4 reflex    Type 2 diabetes mellitus with diabetic polyneuropathy, without long-term current use of insulin (HCC)  Her A1c today is acceptable at 7.5  without medication, continue to monitor.  Lab Results   Component Value Date    HGBA1C 7.5 (A) 01/13/2025       She plans to set up evaluation with her eye doctor Dr. Ochoa.  She does see podiatry, Dr. Epperson.  Orders:    IRIS Diabetic eye exam    POCT hemoglobin A1c    Comprehensive metabolic panel    Chronic anticoagulation  She has had no bleeding  Orders:    CBC    Skin irritation buttocks  She can use nystatin cream for 2 weeks, keep dry, call if worsens  Orders:    nystatin (MYCOSTATIN) cream; Apply topically 2 (two) times a day ( to red, irritated area buttocks for 2 weeks )    Encounter for immunization    Orders:    influenza vaccine, high-dose, PF 0.5 mL (Fluzone High Dose)    She had history of shingles/zoster in the past, she feels she had inflammation recently left clavicular/neck into lower occiput, no lesions on exam here today, observe.    She will continue to ambulate with walker, did have a fall recently, watch for falls.    Patient Instructions   Reviewed health history along with medications.        Immunization History   Administered Date(s) Administered    COVID-19 MODERNA VACC 0.5 ML IM 01/20/2021, 02/17/2021, 11/17/2021    COVID-19 Moderna Vac BIVALENT 12 Yr+ IM 0.5 ML 01/30/2023    INFLUENZA 10/15/2015, 11/01/2017    Influenza Split High Dose Preservative Free IM 09/23/2014, 10/24/2015, 09/27/2016    Influenza, high dose seasonal 0.7 mL 11/13/2018, 11/07/2019, 09/21/2020    Influenza, seasonal, injectable 01/21/2009, 10/01/2011, 10/01/2012    Pneumococcal 10/18/2015    Pneumococcal Conjugate 13-Valent 03/12/2015    Pneumococcal Polysaccharide PPV23 07/22/2003    Zoster 03/01/2011       Discussed Vaccines,    Pneumococcal vax  prev rcvd  She has not done yearly Flu shot.  Given here today  Tdap/tetanus shot can be done at pharmacy (done every 10 yrs for superficial cuts, every 5 yrs for deep wounds)  Can do 'shingles' shot, Shingrix at pharmacy.  Covid vaccine prev rcvd x 4 - can do booster at  "pharmacy    Non-smoker     We discussed end of life planning, she does have a  \"LIVING WILL\"     Glaucoma screening is due-  sees Dr Ochoa    Discussed importance of routine healthy diet ( now gets Meals on Wheels )    We will see her back in 6 months, sooner as needed.    Advance Directives   What are advance directives?  Advance directives are legal documents that state your wishes and plans for medical care. These plans are made ahead of time in case you lose your ability to make decisions for yourself. Advance directives can apply to any medical decision, such as the treatments you want, and if you want to donate organs.   What are the types of advance directives?  There are many types of advance directives, and each state has rules about how to use them. You may choose a combination of any of the following:  Living will:  This is a written record of the treatment you want. You can also choose which treatments you do not want, which to limit, and which to stop at a certain time. This includes surgery, medicine, IV fluid, and tube feedings.   Durable power of  for healthcare (DPAHC):  This is a written record that states who you want to make healthcare choices for you when you are unable to make them for yourself. This person, called a proxy, is usually a family member or a friend. You may choose more than 1 proxy.  Do not resuscitate (DNR) order:  A DNR order is used in case your heart stops beating or you stop breathing. It is a request not to have certain forms of treatment, such as CPR. A DNR order may be included in other types of advance directives.  Medical directive:  This covers the care that you want if you are in a coma, near death, or unable to make decisions for yourself. You can list the treatments you want for each condition. Treatment may include pain medicine, surgery, blood transfusions, dialysis, IV or tube feedings, and a ventilator (breathing machine).  Values history:  This " document has questions about your views, beliefs, and how you feel and think about life. This information can help others choose the care that you would choose.  Why are advance directives important?  An advance directive helps you control your care. Although spoken wishes may be used, it is better to have your wishes written down. Spoken wishes can be misunderstood, or not followed. Treatments may be given even if you do not want them. An advance directive may make it easier for your family to make difficult choices about your care.                        History of Present Illness     HPI  Review of Systems   Constitutional:  Negative for appetite change, fatigue (Chronic), fever and unexpected weight change.        Gets Meals on Wheels for about 6 months, she feels salty, has gained about 3 pounds   HENT:  Positive for rhinorrhea. Negative for sore throat and trouble swallowing.    Eyes:  Positive for visual disturbance (Double vision on left).   Respiratory:  Negative for cough.         No real complaints of shortness of breath today   Cardiovascular:  Positive for leg swelling (Only mild). Negative for chest pain and palpitations.   Gastrointestinal:  Negative for abdominal pain, blood in stool, nausea and vomiting.        No acid reflux     No change in bowel   Genitourinary:  Negative for dysuria and hematuria.   Skin:         She does have some intermittent skin inflammation buttocks, uses nystatin cream at times which helps.   Neurological:  Positive for light-headedness (occ). Negative for syncope and headaches.   Hematological:  Does not bruise/bleed easily.   Psychiatric/Behavioral:  Negative for behavioral problems.          Current Outpatient Medications:     apixaban (Eliquis) 5 mg, Take 1 tablet (5 mg total) by mouth 2 (two) times a day, Disp: 20 tablet, Rfl: 0    atorvastatin (LIPITOR) 80 mg tablet, Take 1 tablet by mouth, Disp: , Rfl:     Cholecalciferol (VITAMIN D) 2000 units CAPS, Take by mouth  "daily, Disp: , Rfl:     cycloSPORINE (RESTASIS) 0.05 % ophthalmic emulsion, Apply 1 drop to eye Medrol Dose Pack scheduling ONLY  , Disp: , Rfl:     ezetimibe (ZETIA) 10 mg tablet, Take 1 tablet (10 mg total) by mouth daily, Disp: 90 tablet, Rfl: 3    furosemide (LASIX) 20 mg tablet, Take 1 tablet (20 mg total) by mouth 2 (two) times a day ( or as directed), Disp: 180 tablet, Rfl: 1    lisinopril (ZESTRIL) 20 mg tablet, TAKE 1 TABLET DAILY, Disp: 90 tablet, Rfl: 1    metoprolol tartrate (LOPRESSOR) 100 mg tablet, TAKE 1 TABLET EVERY 12     HOURS, Disp: 180 tablet, Rfl: 3    Multiple Vitamins-Minerals (HAIR SKIN AND NAILS FORMULA) TABS, Take by mouth, Disp: , Rfl:     nystatin (MYCOSTATIN) cream, Apply topically 2 (two) times a day ( to red, irritated area buttocks for 2 weeks ), Disp: 30 g, Rfl: 1    glucose blood (TRUETRACK TEST) test strip, Use as instructed (Patient not taking: Reported on 1/13/2025), Disp: 100 each, Rfl: 3    nitroglycerin (Nitrostat) 0.4 mg SL tablet, Place 1 tablet (0.4 mg total) under the tongue as needed for chest pain (Patient not taking: Reported on 1/13/2025), Disp: 25 tablet, Rfl: 0    penicillin V potassium (VEETID) 500 mg tablet, as needed For dental procedures. (Patient not taking: Reported on 1/13/2025), Disp: , Rfl:          Objective   /78 (BP Location: Left arm, Patient Position: Sitting, Cuff Size: Standard)   Pulse 65   Temp 97.5 °F (36.4 °C) (Tympanic)   Resp 18   Ht 5' 2\" (1.575 m)   Wt 83.3 kg (183 lb 9.6 oz)   SpO2 98%   BMI 33.58 kg/m²      Physical Exam  Constitutional:       Appearance: Normal appearance. She is not ill-appearing.   Eyes:      General: No scleral icterus.  Cardiovascular:      Comments: Underlying regular rhythm today.  Pulmonary:      Effort: Pulmonary effort is normal. No respiratory distress.      Breath sounds: Normal breath sounds. No wheezing, rhonchi or rales.   Abdominal:      General: There is no distension.      Palpations: Abdomen " is soft.      Tenderness: There is no abdominal tenderness. There is no guarding.   Musculoskeletal:      Comments: Slight pitting both ankles   Lymphadenopathy:      Cervical: No cervical adenopathy.   Neurological:      Mental Status: She is alert. Mental status is at baseline.   Psychiatric:         Behavior: Behavior normal.             Annual Wellness Visit Questionnaire   Rosemary is here for her Subsequent Wellness visit.     Health Risk Assessment:   Patient rates overall health as fair. Patient feels that their physical health rating is slightly worse. Patient is satisfied with their life. Eyesight was rated as slightly worse. Hearing was rated as slightly worse. Patient feels that their emotional and mental health rating is slightly worse. Patients states they are never, rarely angry. Patient states they are sometimes unusually tired/fatigued. Pain experienced in the last 7 days has been some. Patient's pain rating has been 6/10. Patient states that she has experienced no weight loss or gain in last 6 months.     Depression Screening:   PHQ-2 Score: 1      Fall Risk Screening:   In the past year, patient has experienced: history of falling in past year    Number of falls: 2 or more  Injured during fall?: Yes    Feels unsteady when standing or walking?: Yes    Worried about falling?: No      Urinary Incontinence Screening:   Patient has leaked urine accidently in the last six months.     Home Safety:  Patient has trouble with stairs inside or outside of their home. Patient has working smoke alarms and has working carbon monoxide detector. Home safety hazards include: not having non-slip bath and/or shower mats.     Nutrition:   Current diet is Low Cholesterol, Low Saturated Fat and No Added Salt.     Medications:   Patient is currently taking over-the-counter supplements. OTC medications include: see medication list. Patient is able to manage medications.     Activities of Daily Living (ADLs)/Instrumental  Activities of Daily Living (IADLs):   Walk and transfer into and out of bed and chair?: Yes  Dress and groom yourself?: Yes    Bathe or shower yourself?: Yes    Feed yourself? Yes  Do your laundry/housekeeping?: No  Manage your money, pay your bills and track your expenses?: Yes  Make your own meals?: No    Do your own shopping?: No    Previous Hospitalizations:   Any hospitalizations or ED visits within the last 12 months?: No      Advance Care Planning:   Living will: Yes    Durable POA for healthcare: Yes    Advanced directive: Yes      PREVENTIVE SCREENINGS      Cardiovascular Screening:    General: Screening Not Indicated and History Lipid Disorder      Diabetes Screening:     General: Screening Not Indicated and History Diabetes      Colorectal Cancer Screening:     General: Screening Not Indicated      Cervical Cancer Screening:    General: Screening Not Indicated      Abdominal Aortic Aneurysm (AAA) Screening:        General: Screening Not Indicated and History AAA      Lung Cancer Screening:     General: Screening Not Indicated    Screening, Brief Intervention, and Referral to Treatment (SBIRT)    Screening  Typical number of drinks in a day: 0  Typical number of drinks in a week: 0  Interpretation: Low risk drinking behavior.    Single Item Drug Screening:  How often have you used an illegal drug (including marijuana) or a prescription medication for non-medical reasons in the past year? never    Single Item Drug Screen Score: 0  Interpretation: Negative screen for possible drug use disorder    Social Drivers of Health     Food Insecurity: No Food Insecurity (1/13/2025)    Hunger Vital Sign     Worried About Running Out of Food in the Last Year: Never true     Ran Out of Food in the Last Year: Never true   Transportation Needs: No Transportation Needs (1/13/2025)    PRAPARE - Transportation     Lack of Transportation (Medical): No     Lack of Transportation (Non-Medical): No   Housing Stability: Unknown  (1/13/2025)    Housing Stability Vital Sign     Unable to Pay for Housing in the Last Year: No   Utilities: Not At Risk (1/13/2025)    [facility] Utilities     Threatened with loss of utilities: No

## 2025-01-13 NOTE — ASSESSMENT & PLAN NOTE
Wt Readings from Last 3 Encounters:   01/13/25 83.3 kg (183 lb 9.6 oz)   11/08/23 82 kg (180 lb 12.8 oz)   10/17/23 83.5 kg (184 lb)     She has had Meals on Wheels for the past 6 months, relates meals are salty, has gained about 3 pounds.  Continue on furosemide 20 mg daily, she will go for updated blood work.    Her last blood work was in April 2024 at the emergency room, hemoglobin was 13.1, creatinine 1.08 with potassium 4.6, .          Orders:    CBC    Comprehensive metabolic panel    TSH, 3rd generation with Free T4 reflex    B-Type Natriuretic Peptide(BNP); Future    Magnesium; Future

## 2025-01-13 NOTE — ASSESSMENT & PLAN NOTE
Her blood pressure was okay today at 126/78, she was not orthostatic.  Orders:    Comprehensive metabolic panel

## 2025-01-13 NOTE — ASSESSMENT & PLAN NOTE
She will continue to see [facility] cardiology, saw Dr. Kent in August 2024.  She has not required nitroglycerin recently, continue on -    atorvastatin 80 mg daily,   furosemide 20 mg twice daily,   Zetia 10 mg daily,   metoprolol tartrate 100 mg twice daily,   lisinopril 20 mg daily.      Orders:    CBC    Comprehensive metabolic panel    Last LDL at goal, was less than 60

## 2025-01-13 NOTE — PROGRESS NOTES
Diabetic Foot Exam-she also does see podiatry, Dr. Epperson, routinely    Patient's shoes and socks removed.    Right Foot/Ankle   Right Foot Inspection  Skin Exam: skin normal, skin intact and dry skin. No warmth, no callus, no erythema, no maceration, no abnormal color, no pre-ulcer, no ulcer and no callus.     Toe Exam: ROM and strength within normal limits.     Sensory   Monofilament testing: intact    Vascular  Capillary refills: < 3 seconds  The right DP pulse is 2+. The right PT pulse is 2+.     Left Foot/Ankle  Left Foot Inspection  Skin Exam: skin normal, skin intact and dry skin. No warmth, no erythema, no maceration, normal color, no pre-ulcer, no ulcer and no callus.     Toe Exam: ROM and strength within normal limits.     Sensory   Monofilament testing: intact    Vascular  Capillary refills: < 3 seconds  The left DP pulse is 2+. The left PT pulse is 2+.     Assign Risk Category  No deformity present  Loss of protective sensation  Weak pulses  Risk: 2

## 2025-01-13 NOTE — ASSESSMENT & PLAN NOTE
She continues on anticoagulation with Eliquis 5 mg twice daily through cardiology, she relates she may need to be changing this due to insurance coverage.  She has had no bleeding  Orders:    CBC    Comprehensive metabolic panel    TSH, 3rd generation with Free T4 reflex

## 2025-01-13 NOTE — PATIENT INSTRUCTIONS
"Reviewed health history along with medications.        Immunization History   Administered Date(s) Administered    COVID-19 MODERNA VACC 0.5 ML IM 01/20/2021, 02/17/2021, 11/17/2021    COVID-19 Moderna Vac BIVALENT 12 Yr+ IM 0.5 ML 01/30/2023    INFLUENZA 10/15/2015, 11/01/2017    Influenza Split High Dose Preservative Free IM 09/23/2014, 10/24/2015, 09/27/2016    Influenza, high dose seasonal 0.7 mL 11/13/2018, 11/07/2019, 09/21/2020    Influenza, seasonal, injectable 01/21/2009, 10/01/2011, 10/01/2012    Pneumococcal 10/18/2015    Pneumococcal Conjugate 13-Valent 03/12/2015    Pneumococcal Polysaccharide PPV23 07/22/2003    Zoster 03/01/2011       Discussed Vaccines,    Pneumococcal vax  prev rcvd  She has not done yearly Flu shot.  Given here today  Tdap/tetanus shot can be done at pharmacy (done every 10 yrs for superficial cuts, every 5 yrs for deep wounds)  Can do 'shingles' shot, Shingrix at pharmacy.  Covid vaccine prev rcvd x 4 - can do booster at pharmacy    Non-smoker     We discussed end of life planning, she does have a  \"LIVING WILL\"     Glaucoma screening is due-  sees Dr Ochoa    Discussed importance of routine healthy diet ( now gets Meals on Wheels )    We will see her back in 6 months, sooner as needed.    Advance Directives   What are advance directives?  Advance directives are legal documents that state your wishes and plans for medical care. These plans are made ahead of time in case you lose your ability to make decisions for yourself. Advance directives can apply to any medical decision, such as the treatments you want, and if you want to donate organs.   What are the types of advance directives?  There are many types of advance directives, and each state has rules about how to use them. You may choose a combination of any of the following:  Living will:  This is a written record of the treatment you want. You can also choose which treatments you do not want, which to limit, and which to " stop at a certain time. This includes surgery, medicine, IV fluid, and tube feedings.   Durable power of  for healthcare (DPAHC):  This is a written record that states who you want to make healthcare choices for you when you are unable to make them for yourself. This person, called a proxy, is usually a family member or a friend. You may choose more than 1 proxy.  Do not resuscitate (DNR) order:  A DNR order is used in case your heart stops beating or you stop breathing. It is a request not to have certain forms of treatment, such as CPR. A DNR order may be included in other types of advance directives.  Medical directive:  This covers the care that you want if you are in a coma, near death, or unable to make decisions for yourself. You can list the treatments you want for each condition. Treatment may include pain medicine, surgery, blood transfusions, dialysis, IV or tube feedings, and a ventilator (breathing machine).  Values history:  This document has questions about your views, beliefs, and how you feel and think about life. This information can help others choose the care that you would choose.  Why are advance directives important?  An advance directive helps you control your care. Although spoken wishes may be used, it is better to have your wishes written down. Spoken wishes can be misunderstood, or not followed. Treatments may be given even if you do not want them. An advance directive may make it easier for your family to make difficult choices about your care.

## 2025-01-13 NOTE — ASSESSMENT & PLAN NOTE
Her A1c today is acceptable at 7.5 without medication, continue to monitor.  Lab Results   Component Value Date    HGBA1C 7.5 (A) 01/13/2025       She plans to set up evaluation with her eye doctor Dr. Ochoa.  She does see podiatry, Dr. Epperson.  Orders:    IRIS Diabetic eye exam    POCT hemoglobin A1c    Comprehensive metabolic panel

## 2025-01-15 ENCOUNTER — RESULTS FOLLOW-UP (OUTPATIENT)
Dept: FAMILY MEDICINE CLINIC | Facility: CLINIC | Age: OVER 89
End: 2025-01-15

## 2025-01-15 LAB
LEFT EYE DIABETIC RETINOPATHY: ABNORMAL
LEFT EYE IMAGE QUALITY: ABNORMAL
LEFT EYE MACULAR EDEMA: ABNORMAL
LEFT EYE OTHER RETINOPATHY: ABNORMAL
RIGHT EYE DIABETIC RETINOPATHY: ABNORMAL
RIGHT EYE IMAGE QUALITY: ABNORMAL
RIGHT EYE MACULAR EDEMA: ABNORMAL
RIGHT EYE OTHER RETINOPATHY: ABNORMAL
SEVERITY (EYE EXAM): ABNORMAL

## 2025-01-31 DIAGNOSIS — I10 ESSENTIAL HYPERTENSION: ICD-10-CM

## 2025-01-31 DIAGNOSIS — I48.21 PERMANENT ATRIAL FIBRILLATION (HCC): ICD-10-CM

## 2025-01-31 DIAGNOSIS — I25.10 CORONARY ARTERIOSCLEROSIS: ICD-10-CM

## 2025-01-31 RX ORDER — METOPROLOL TARTRATE 100 MG/1
100 TABLET ORAL EVERY 12 HOURS
Qty: 180 TABLET | Refills: 1 | Status: SHIPPED | OUTPATIENT
Start: 2025-01-31

## 2025-06-03 ENCOUNTER — OFFICE VISIT (OUTPATIENT)
Dept: FAMILY MEDICINE CLINIC | Facility: CLINIC | Age: OVER 89
End: 2025-06-03
Payer: COMMERCIAL

## 2025-06-03 VITALS
DIASTOLIC BLOOD PRESSURE: 76 MMHG | SYSTOLIC BLOOD PRESSURE: 118 MMHG | WEIGHT: 175.4 LBS | OXYGEN SATURATION: 98 % | HEART RATE: 83 BPM | BODY MASS INDEX: 32.08 KG/M2

## 2025-06-03 DIAGNOSIS — N18.32 STAGE 3B CHRONIC KIDNEY DISEASE (HCC): ICD-10-CM

## 2025-06-03 DIAGNOSIS — B02.9 HERPES ZOSTER WITHOUT COMPLICATION: Primary | ICD-10-CM

## 2025-06-03 PROCEDURE — 99213 OFFICE O/P EST LOW 20 MIN: CPT | Performed by: INTERNAL MEDICINE

## 2025-06-03 PROCEDURE — G2211 COMPLEX E/M VISIT ADD ON: HCPCS | Performed by: INTERNAL MEDICINE

## 2025-06-03 RX ORDER — VALACYCLOVIR HYDROCHLORIDE 1 G/1
1000 TABLET, FILM COATED ORAL 3 TIMES DAILY
Qty: 21 TABLET | Refills: 0 | Status: SHIPPED | OUTPATIENT
Start: 2025-06-03 | End: 2025-06-10

## 2025-06-03 NOTE — ASSESSMENT & PLAN NOTE
Lab Results   Component Value Date    EGFR 49 (L) 04/01/2024    EGFR 47 (L) 05/25/2023    EGFR 62 03/28/2023    CREATININE 1.08 04/01/2024    CREATININE 1.12 (H) 05/25/2023    CREATININE 0.89 03/28/2023            
Yes

## 2025-06-03 NOTE — PROGRESS NOTES
Name: Rosemary Casper      : 1933      MRN: [id number]  Encounter Provider: Janes Villanueva MD  Encounter Date: 6/3/2025   Encounter department: [facility] PRIMARY CARE    Assessment & Plan  Herpes zoster without complication  Can be due to shingles, will start on Valtrex 1000 mg 3 times daily for 7 days.  Side effects discussed.  Follow-up in the office if no improvement in 7 to 10 days.  Orders:    valACYclovir (VALTREX) 1,000 mg tablet; Take 1 tablet (1,000 mg total) by mouth 3 (three) times a day for 7 days    Stage 3b chronic kidney disease (HCC)  Lab Results   Component Value Date    EGFR 49 (L) 2024    EGFR 47 (L) 2023    EGFR 62 2023    CREATININE 1.08 2024    CREATININE 1.12 (H) 2023    CREATININE 0.89 2023                 History of Present Illness     Patient came today with new episode of the rash on the right side of her chest, abdomen and right upper extremity.    Rash      Review of Systems   Skin:  Positive for rash.     Past Medical History[1]  Past Surgical History[2]  Family History[3]  Social History[4]  Medications[5]  Allergies   Allergen Reactions    Celecoxib      Reaction Date: 2011;     Latex     Adhesive [Medical Tape] Rash    Iodinated Contrast Media Rash    Sulfa Antibiotics Rash     Immunization History   Administered Date(s) Administered    COVID-19 MODERNA VACC 0.5 ML IM 2021, 2021, 2021    COVID-19 Moderna Vac BIVALENT 12 Yr+ IM 0.5 ML 2023    INFLUENZA 10/15/2015, 2017    Influenza Split High Dose Preservative Free IM 2014, 10/24/2015, 2016, 2025    Influenza, high dose seasonal 0.7 mL 2018, 2019, 2020    Influenza, seasonal, injectable 2009, 10/01/2011, 10/01/2012    Pneumococcal 10/18/2015    Pneumococcal Conjugate 13-Valent 2015    Pneumococcal Polysaccharide PPV23 2003    Zoster 2011     Objective   /76 (BP  Location: Right arm, Patient Position: Sitting, Cuff Size: Large)   Pulse 83   Wt 79.6 kg (175 lb 6.4 oz)   SpO2 98%   BMI 32.08 kg/m²     Physical Exam    Skin:     Findings: Rash (Vesicular rash) present.              [1]   Past Medical History:  Diagnosis Date    Atrial fibrillation with rapid ventricular response (HCC)     RESOLVED: 24MAR2017    Lumbar compression fracture (HCC)     RESOLVED: [id number]    Meningocele (HCC)     ACQUIRED SPINAL CORD; SURGERY 1934    Nasal fracture     RESOLVED: [id number]   [2]   Past Surgical History:  Procedure Laterality Date    CATARACT EXTRACTION      COLONOSCOPY      ONSET: 1998    HYSTERECTOMY      REPLACEMENT TOTAL KNEE BILATERAL      ONSET: NOV 2011    SCALP EXCISION  1934    LESION;     TONSILLECTOMY      TRANSLUMINAL ANGIOPLASTY      INTRAOPERATIVE    [3]   Family History  Problem Relation Name Age of Onset    Eclampsia Mother      Lung cancer Father      Diabetes Son      Breast cancer Family     [4]   Social History  Tobacco Use    Smoking status: Never    Smokeless tobacco: Never   Vaping Use    Vaping status: Never Used   Substance and Sexual Activity    Alcohol use: No    Drug use: No    Sexual activity: Not Currently   [5]   Current Outpatient Medications on File Prior to Visit   Medication Sig    apixaban (Eliquis) 5 mg Take 1 tablet (5 mg total) by mouth 2 (two) times a day    atorvastatin (LIPITOR) 80 mg tablet Take 1 tablet by mouth    Cholecalciferol (VITAMIN D) 2000 units CAPS Take by mouth in the morning.    cycloSPORINE (RESTASIS) 0.05 % ophthalmic emulsion Apply 1 drop to eye once at bedtime    ezetimibe (ZETIA) 10 mg tablet Take 1 tablet (10 mg total) by mouth daily    furosemide (LASIX) 20 mg tablet Take 1 tablet (20 mg total) by mouth 2 (two) times a day ( or as directed)    lisinopril (ZESTRIL) 20 mg tablet TAKE 1 TABLET DAILY    metoprolol tartrate (LOPRESSOR) 100 mg tablet TAKE 1 TABLET EVERY 12     HOURS    Multiple Vitamins-Minerals (HAIR SKIN  AND NAILS FORMULA) TABS Take by mouth    nystatin (MYCOSTATIN) cream Apply topically 2 (two) times a day ( to red, irritated area buttocks for 2 weeks )    glucose blood (TRUETRACK TEST) test strip Use as instructed (Patient not taking: Reported on 1/13/2025)    nitroglycerin (Nitrostat) 0.4 mg SL tablet Place 1 tablet (0.4 mg total) under the tongue as needed for chest pain (Patient not taking: Reported on 6/3/2025)    penicillin V potassium (VEETID) 500 mg tablet as needed For dental procedures. (Patient not taking: Reported on 1/13/2025)

## 2025-06-10 ENCOUNTER — TELEPHONE (OUTPATIENT)
Dept: FAMILY MEDICINE CLINIC | Facility: CLINIC | Age: OVER 89
End: 2025-06-10

## 2025-06-10 NOTE — TELEPHONE ENCOUNTER
[facility]  Called to s/w Dr Villanueva. Pt needs appt for follow up. She was trying to hang something on her wall and fell forward, hit her head and broke glass She is to take eliquis 5mg and has not been taking this. she stated that it is too expensive due to her deductible. I tried to call the pt and schedule her for a follow up and L/m for her to call us back and schedule.  I also looked to see what other family member I can call to s/w re; a follow up. However pt CC form is not up to date and last on was from 2023. I then called pt son who is on CC form from 2023, but his mailbox is full  and I am not able to leave a message.      When looking into this further it appears pt is still present at [facility]    Pt has an OV with [facility]iology but not till 9.8.25 With Dr. Kent.

## 2025-06-11 NOTE — TELEPHONE ENCOUNTER
Called pt back she is scheduled 6.13.25 at 1:45 with Dr Canchola. She was advised to call us if she can not keep this appt.

## 2025-06-13 ENCOUNTER — OFFICE VISIT (OUTPATIENT)
Dept: FAMILY MEDICINE CLINIC | Facility: CLINIC | Age: OVER 89
End: 2025-06-13
Payer: COMMERCIAL

## 2025-06-13 VITALS
SYSTOLIC BLOOD PRESSURE: 110 MMHG | DIASTOLIC BLOOD PRESSURE: 72 MMHG | OXYGEN SATURATION: 98 % | BODY MASS INDEX: 30.65 KG/M2 | WEIGHT: 167.6 LBS | HEART RATE: 75 BPM

## 2025-06-13 DIAGNOSIS — I48.21 PERMANENT ATRIAL FIBRILLATION (HCC): Primary | ICD-10-CM

## 2025-06-13 DIAGNOSIS — E11.42 TYPE 2 DIABETES MELLITUS WITH DIABETIC POLYNEUROPATHY, WITHOUT LONG-TERM CURRENT USE OF INSULIN (HCC): ICD-10-CM

## 2025-06-13 PROCEDURE — G2211 COMPLEX E/M VISIT ADD ON: HCPCS | Performed by: FAMILY MEDICINE

## 2025-06-13 PROCEDURE — 99214 OFFICE O/P EST MOD 30 MIN: CPT | Performed by: FAMILY MEDICINE

## 2025-06-13 RX ORDER — WARFARIN SODIUM 5 MG/1
5 TABLET ORAL
Qty: 30 TABLET | Refills: 0 | Status: SHIPPED | OUTPATIENT
Start: 2025-06-13 | End: 2025-06-13

## 2025-06-13 RX ORDER — WARFARIN SODIUM 1 MG/1
1 TABLET ORAL DAILY
COMMUNITY
Start: 2025-06-10 | End: 2025-06-13

## 2025-06-13 RX ORDER — ENOXAPARIN SODIUM 100 MG/ML
80 INJECTION SUBCUTANEOUS EVERY 12 HOURS
COMMUNITY
Start: 2025-06-10 | End: 2025-06-13

## 2025-06-13 RX ORDER — ENOXAPARIN SODIUM 100 MG/ML
INJECTION SUBCUTANEOUS
COMMUNITY

## 2025-06-13 NOTE — ASSESSMENT & PLAN NOTE
Continue warfarin for now, is high risk with multiple falls, apparently has been using eliquis once per day for about a year  and then stopped completely 1-2 months ago, she was started on warfarin 1mg daily in the ER, she was unaware of the need for INR checks, she will be very high risk with this medication.     Pharmacy called and Xarelto $800 dollars a month and eliquis even more,     Called cardiology office to update them and [facility] coumadin clinic as well, nurse at [facility] will reach out to patient to schedule,     in the meantime change to 5mg daily and check INR today.        Orders:    Ambulatory Referral to Social Work Care Management Program; Future    Protime-INR; Future    warfarin (Jantoven) 5 mg tablet; Take 1 tablet (5 mg total) by mouth once for 1 dose

## 2025-06-13 NOTE — PROGRESS NOTES
Name: Rosemary Casper      : 1933      MRN: [id number]  Encounter Provider: Nate Canchola MD  Encounter Date: 2025   Encounter department: [facility] PRIMARY CARE  :  Assessment & Plan  Permanent atrial fibrillation (HCC)    Continue warfarin for now, is high risk with multiple falls, apparently has been using eliquis once per day for about a year  and then stopped completely 1-2 months ago, she was started on warfarin 1mg daily in the ER, she was unaware of the need for INR checks, she will be very high risk with this medication.     Pharmacy called and Xarelto $800 dollars a month and eliquis even more,     Called cardiology office to update them and [facility] coumadin clinic as well, nurse at [facility] will reach out to patient to schedule,     in the meantime change to 5mg daily and check INR today.        Orders:    Ambulatory Referral to Social Work Care Management Program; Future    Protime-INR; Future    warfarin (Jantoven) 5 mg tablet; Take 1 tablet (5 mg total) by mouth once for 1 dose    Type 2 diabetes mellitus with diabetic polyneuropathy, without long-term current use of insulin ([facility])    Lab Results   Component Value Date    HGBA1C 7.3 (H) 2025     Controlled continue to [facility]    Orders:    IRIS Diabetic eye exam    Ambulatory Referral to Ophthalmology; Future           History of Present Illness   HPI    89 year old presenting in ER follow up after a fall, her previous PCP has recently retired.    She had negative scans and is improving, she was trying to hang something on the wall and fell.    In the ER, it was noted she had stopped eliquis, she was started on lovenox and warfarin 1mg, she is unaware of the need for INR monitoring on this medication.        Review of Systems   Constitutional:  Negative for activity change and appetite change.   Respiratory:  Negative for apnea and chest tightness.    Cardiovascular:  Negative for chest pain and  palpitations.   Gastrointestinal:  Negative for abdominal distention and abdominal pain.   Musculoskeletal:  Negative for arthralgias and back pain.       Objective   /72 (BP Location: Left arm, Patient Position: Sitting, Cuff Size: Standard)   Pulse 75   Wt 76 kg (167 lb 9.6 oz)   SpO2 98%   BMI 30.65 kg/m²      Physical Exam  Constitutional:       Appearance: Normal appearance.     Cardiovascular:      Rate and Rhythm: Normal rate and regular rhythm.      Pulses: Normal pulses.      Heart sounds: Normal heart sounds.     Musculoskeletal:         General: Normal range of motion.     Neurological:      General: No focal deficit present.      Mental Status: She is alert and oriented to person, place, and time.

## 2025-06-13 NOTE — PATIENT INSTRUCTIONS
Make appointment with [facility] anticoagulation clinic     1. Permanent atrial fibrillation (HCC)

## 2025-06-13 NOTE — ASSESSMENT & PLAN NOTE
Lab Results   Component Value Date    HGBA1C 7.3 (H) 04/25/2025     [facility]    Orders:    IRIS Diabetic eye exam    Ambulatory Referral to Ophthalmology; Future

## 2025-06-17 ENCOUNTER — TELEPHONE (OUTPATIENT)
Age: OVER 89
End: 2025-06-17

## 2025-06-17 NOTE — TELEPHONE ENCOUNTER
Abundio Strauss in [facility] Coumadin Clinic [phone]  Relayed PCP message she will inform Pt.

## 2025-06-17 NOTE — TELEPHONE ENCOUNTER
[facility] from Cardiology called to verify that patient will be starting Coumadin or Eliquis. They were going to start her on Coumadin, but she reported her PCP was working on getting her Eliquis approved. They would like a call back to advise.

## 2025-06-18 ENCOUNTER — PATIENT OUTREACH (OUTPATIENT)
Dept: CASE MANAGEMENT | Facility: OTHER | Age: OVER 89
End: 2025-06-18

## 2025-06-24 ENCOUNTER — PATIENT OUTREACH (OUTPATIENT)
Dept: CASE MANAGEMENT | Facility: OTHER | Age: OVER 89
End: 2025-06-24

## 2025-06-24 NOTE — PROGRESS NOTES
Received referral from patients PCP Nate Canchola MD requesting that [facility] outreach patient and assess for needs. Per chart review, patient unable to afford Eliquis medication.     [facility] spoke with patient, introduced role and reason for call. Patient reported doing well. Discussed  referral to assist with medication costs. Patient reported that she was interested in Eliquis, but would be hard to afford. Discussed [employer] and determined that patient may qualify for program. Patient requested assistance with application and dropping forms off to PCP office.     Patient lives alone. She does have Meals on Wheels and her grandchildren assist with getting groceries. She also has aides that come Tuesday and Fridays to assist with different chores at home. [facility] discussed [employer] as patient would qualify for services. Patient was interested and approved to have [facility] place referral.    Patient has family and aides who assist with transportation to appointments.    Patient reported no other needs at this time. [facility] placed referral to Missy Vega at [employer] via email and added CMOC to supports and services program to assist with PAP application.

## 2025-06-26 ENCOUNTER — PATIENT OUTREACH (OUTPATIENT)
Dept: CASE MANAGEMENT | Facility: OTHER | Age: OVER 89
End: 2025-06-26

## 2025-06-26 NOTE — PROGRESS NOTES
SWCM reviewed PAP application for Eliquis. Per application, patients with Medicare part D will need to provide documentation that they have spent at least 3% of their yearly household income on out-of-pocket (OOP) prescription expenses in the year for which they are seeking assistance. Insurance card via [employer] shows that patient has Medicare Part D.     SWCM contacted patient to discuss further before beginning application process. No answer and left message for return call.    Routing note to CMOC as fyi before scheduling home visit.

## 2025-06-27 ENCOUNTER — TELEPHONE (OUTPATIENT)
Dept: FAMILY MEDICINE CLINIC | Facility: CLINIC | Age: OVER 89
End: 2025-06-27

## 2025-06-27 NOTE — TELEPHONE ENCOUNTER
Received fax through [address] from [employer]. Pt's last OV was 6-13-25 with Dr. Canchola, and next appt is 7-16-25 at 1:00PM with Dr. Canchola. Forms were placed in Dr. Canchola's folder and given to [facility].

## 2025-07-01 ENCOUNTER — PATIENT OUTREACH (OUTPATIENT)
Dept: CASE MANAGEMENT | Facility: OTHER | Age: OVER 89
End: 2025-07-01

## 2025-07-01 NOTE — PROGRESS NOTES
CMOC received pt referral from CMOC WQ by [facility]. CMOC sent [facility] inbasket that CMOC assigned self to pt. CMOC attempted outreach to pt introducing self and reason for call to assist with either PACE or PAP program applications. CMOC asked for a return call.  Will F/U by 7/10 and route note to [facility].

## 2025-07-03 ENCOUNTER — PATIENT OUTREACH (OUTPATIENT)
Dept: CASE MANAGEMENT | Facility: OTHER | Age: OVER 89
End: 2025-07-03

## 2025-07-03 NOTE — PROGRESS NOTES
2nd attempt outreaching patient to discuss eligibility for PACE vs Eliquis PAP. No answer and left message for return call. UTR letter was sent. Will complete 3rd attempt if SWCM does not hear back from patient.     Routing note to CMOC Liliam quarles.

## 2025-07-03 NOTE — LETTER
[address] [facility] [address]  [phone]    Re: Unable to reach   7/3/2025       Dear Rosemary,    I am a [facility]  and wanted to be certain you had information to contact me should you desire assistance with or have questions about non-medical aspects of your care such as [but not limited to] medical insurance, housing, transportation, material needs, or emergency needs.  If I do not have an answer I will assist you in finding the appropriate agency or individual who can help.      Please feel free to contact me at [phone]. Thank You.    Sincerely,         Katy Anguiano

## 2025-07-10 ENCOUNTER — PATIENT OUTREACH (OUTPATIENT)
Dept: CASE MANAGEMENT | Facility: OTHER | Age: OVER 89
End: 2025-07-10

## 2025-07-10 ENCOUNTER — TRANSITIONAL CARE MANAGEMENT (OUTPATIENT)
Dept: FAMILY MEDICINE CLINIC | Facility: CLINIC | Age: OVER 89
End: 2025-07-10

## 2025-07-10 NOTE — PROGRESS NOTES
CMOC received ADT alert message that pt was admitted to the hospital since 7/3. CMOC did chart review on pt and will follow-up with pt upon discharge regarding PACENET or PAP application.    Will f/u by 7/17 and route note to [facility].

## 2025-07-12 ENCOUNTER — NURSE TRIAGE (OUTPATIENT)
Dept: OTHER | Facility: OTHER | Age: OVER 89
End: 2025-07-12

## 2025-07-12 NOTE — TELEPHONE ENCOUNTER
"REASON FOR CONVERSATION: Hypotension    SYMPTOMS: fatigue    OTHER HEALTH INFORMATION: n/a     PROTOCOL DISPOSITION: Go to ED Now    CARE ADVICE PROVIDED: Per on call provider, he would recommend a hospital or urgent care evaluation with a BP that low. Informed Qing and patient. They both verbalized understanding.     PRACTICE FOLLOW-UP: please follow up with patient post ED visit.         Reason for Disposition   [1] Systolic BP < 80 AND [2] NOT feeling weak or lightheaded    Answer Assessment - Initial Assessment Questions  1. BLOOD PRESSURE: \"What is your blood pressure?\" \"Did you take at least two measurements 5 minutes apart?\"        80/50 and 85/50     2. ONSET: \"When did you take your blood pressure?\"        7/12    3. HOW: \"How did you take your blood pressure?\" (e.g., visiting nurse, automatic home BP monitor)        Manual BP cuff    4. HISTORY: \"Do you have a history of low blood pressure?\" \"What is your blood pressure normally?\"        No, patient has HTN    5. MEDICINES: \"Are you taking any medicines for blood pressure?\" If Yes, ask: \"Have they been changed recently?\"        Yes metoprolol bid and lisinopril- took her medications at 10am     6. PULSE RATE: \"Do you know what your pulse rate is?\"         70    7. OTHER SYMPTOMS: \"Have you been sick recently?\" \"Have you had a recent injury?\"        Looks and feels tired    Protocols used: Blood Pressure - Low-Adult-AH    "

## 2025-07-12 NOTE — TELEPHONE ENCOUNTER
"[age] y.o./soft BP 80/50/ recently discharged from hospital 25  ----- Message from Mayte BARON sent at 2025 12:57 PM EDT -----  Qing stated, \"BP taken manually and is soft 80/50. Her BP medication was taken and its scheduled BID. No abnormal symptoms. No lightheadedness or dizziness. Not looking toxic. However, was recently in the hospital for a UTI. Should she hold her evening meds? Should she come into the hospital? Her Dr. Is Sushant from [facility].\"    "

## 2025-07-13 ENCOUNTER — TELEPHONE (OUTPATIENT)
Dept: OTHER | Facility: OTHER | Age: OVER 89
End: 2025-07-13

## 2025-07-13 NOTE — TELEPHONE ENCOUNTER
Pt called to confirm appt was supposed to be for 7/14. Informed pt appt was cancelled by provider and rescheduled for 7/1/25 1:00 PM. Pt verbalized understanding

## 2025-07-14 ENCOUNTER — PATIENT OUTREACH (OUTPATIENT)
Dept: CASE MANAGEMENT | Facility: OTHER | Age: OVER 89
End: 2025-07-14

## 2025-07-14 NOTE — PROGRESS NOTES
CMOC outreached pt today by via phone and introduced self to pt and gave reason for call. Patient was logged on [employer] to [employer] (program) for health and money  CMOC asked pt if she still wanted help with applying for [employer] and pt said yes. CMOC communicated that pt seemed to recently been released from the hospital , due to not feeling well and asked pt if she was feeling better?  Pt communicated she was sitting there with her aide and that she seen her doctor not too long ago today and that she have an appointment on this Thursday. CMOC asked if maybe CMOC can schedule to meet with pt on 7/18 around 1pm and if she need to reschedule, we can do that as well considering pt was just discharged from the hospital on 7/12. Pt responded that's fine and she would see CMOC on Friday then.  Will F/U by 7/18 @ 1pm and route note to [facility].

## 2025-07-18 ENCOUNTER — PATIENT OUTREACH (OUTPATIENT)
Dept: CASE MANAGEMENT | Facility: OTHER | Age: OVER 89
End: 2025-07-18

## 2025-07-18 NOTE — PROGRESS NOTES
Patient meets criteria for home/community visit (Assisting with application completion). Scheduled home visit for 7/18 @1pm and completed COVID Screening in preparation for home/community visit.  Care Management  assisted patient with completing applications for community resources (Medication Assistance).  CMOC sent  completed application to [employer] via email.  Care Management  provided update to Care Manager and scheduled Next Outreach with patient to follow-up on Medication Assistance.  This encounter was created in error - please disregard.

## 2025-07-18 NOTE — PROGRESS NOTES
Patient meets criteria for home/community visit. Reminded Pt of Scheduled home visit for 7/18 @1pm and completed COVID Screening in preparation for home/community visit to do [employer] Application    Care Management  outreached patient to confirm home visit date and time.  Patient confirmed planned home visit and CMOC confirmed that there are no changes to the previous COVID Screening.  Update routed to Care Manager. .    Completed home visit due to Assisting with application completion  Care Management  provided update to Care Manager and scheduled Next Outreach with patient to follow-up on Medication Assistance.

## 2025-07-21 ENCOUNTER — PATIENT OUTREACH (OUTPATIENT)
Dept: CASE MANAGEMENT | Facility: OTHER | Age: OVER 89
End: 2025-07-21

## 2025-07-21 NOTE — PROGRESS NOTES
Received IB from [facility] regarding case. Home visit was completed 7/18 to complete [facility] application.     Received email from Missy Vega from [employer]. Reported that they are still working to get patient approved for services, but refusing the assessments with the [facility] and [facility] because she is overwhelmed from being in the hospital.    Spoke with patient. Reported doing well. Discussed [facility]. She reported that she does intend to complete assessment with [facility] and was just feeling overwhelmed at the time of call from Missy. Aware that Missy will follow up and will schedule assessment.     Patient reported no other needs at this time and aware that she can reach out if other needs arise. [facility] will continue to follow and assist as needed. Routing note to CMOC as fyi.

## 2025-07-24 ENCOUNTER — PATIENT OUTREACH (OUTPATIENT)
Dept: CASE MANAGEMENT | Facility: OTHER | Age: OVER 89
End: 2025-07-24

## 2025-07-24 NOTE — PROGRESS NOTES
Care Management  outreached community agency ([facility]) to check status of referral that was previously placed for patient.  Per community agency, status of referral is Successful .      Care Management  outreached patient to follow-up on resources previously provided ([facility]).  Outcome regarding these resources was Successful and pt provided card ID #[id number].      Care Management  completed Care Plan and Checklist tasks that were assigned.  CMOC provided update to Care Manager, removed self from case/care team, ended call sequence, and end-dated [facility] CMOC Support and Service Type.

## 2025-07-31 ENCOUNTER — PATIENT OUTREACH (OUTPATIENT)
Dept: CASE MANAGEMENT | Facility: OTHER | Age: OVER 89
End: 2025-07-31

## 2025-08-07 ENCOUNTER — TELEPHONE (OUTPATIENT)
Age: OVER 89
End: 2025-08-07

## 2025-08-08 LAB
DME PARACHUTE DELIVERY DATE REQUESTED: NORMAL
DME PARACHUTE ITEM DESCRIPTION: NORMAL
DME PARACHUTE ITEM DESCRIPTION: NORMAL
DME PARACHUTE ORDER STATUS: NORMAL
DME PARACHUTE SUPPLIER NAME: NORMAL
DME PARACHUTE SUPPLIER PHONE: NORMAL

## 2025-08-12 ENCOUNTER — OFFICE VISIT (OUTPATIENT)
Dept: FAMILY MEDICINE CLINIC | Facility: CLINIC | Age: OVER 89
End: 2025-08-12
Payer: COMMERCIAL

## 2025-08-12 ENCOUNTER — APPOINTMENT (OUTPATIENT)
Dept: LAB | Facility: MEDICAL CENTER | Age: OVER 89
End: 2025-08-12
Attending: FAMILY MEDICINE
Payer: COMMERCIAL

## 2025-08-16 ENCOUNTER — NURSE TRIAGE (OUTPATIENT)
Dept: OTHER | Facility: OTHER | Age: OVER 89
End: 2025-08-16

## 2025-08-21 ENCOUNTER — PATIENT OUTREACH (OUTPATIENT)
Dept: CASE MANAGEMENT | Facility: OTHER | Age: OVER 89
End: 2025-08-21